# Patient Record
Sex: FEMALE | Race: WHITE | Employment: OTHER | URBAN - METROPOLITAN AREA
[De-identification: names, ages, dates, MRNs, and addresses within clinical notes are randomized per-mention and may not be internally consistent; named-entity substitution may affect disease eponyms.]

---

## 2017-01-03 ENCOUNTER — OFFICE VISIT (OUTPATIENT)
Dept: PAIN MANAGEMENT | Age: 67
End: 2017-01-03

## 2017-01-03 VITALS
HEART RATE: 96 BPM | WEIGHT: 149 LBS | DIASTOLIC BLOOD PRESSURE: 87 MMHG | SYSTOLIC BLOOD PRESSURE: 128 MMHG | BODY MASS INDEX: 28.15 KG/M2

## 2017-01-03 DIAGNOSIS — R53.82 CHRONIC FATIGUE: ICD-10-CM

## 2017-01-03 DIAGNOSIS — M96.1 LUMBAR POST-LAMINECTOMY SYNDROME: ICD-10-CM

## 2017-01-03 DIAGNOSIS — I10 ESSENTIAL HYPERTENSION: ICD-10-CM

## 2017-01-03 DIAGNOSIS — R76.8 HEPATITIS B ANTIBODY POSITIVE: ICD-10-CM

## 2017-01-03 DIAGNOSIS — E78.5 HYPERLIPIDEMIA, UNSPECIFIED HYPERLIPIDEMIA TYPE: ICD-10-CM

## 2017-01-03 DIAGNOSIS — M47.817 LUMBOSACRAL SPONDYLOSIS WITHOUT MYELOPATHY: ICD-10-CM

## 2017-01-03 DIAGNOSIS — M15.9 PRIMARY OSTEOARTHRITIS INVOLVING MULTIPLE JOINTS: ICD-10-CM

## 2017-01-03 DIAGNOSIS — F51.04 CHRONIC INSOMNIA: ICD-10-CM

## 2017-01-03 DIAGNOSIS — M79.2 NEURITIS: ICD-10-CM

## 2017-01-03 DIAGNOSIS — Z79.899 ENCOUNTER FOR LONG-TERM (CURRENT) USE OF HIGH-RISK MEDICATION: ICD-10-CM

## 2017-01-03 DIAGNOSIS — G43.719 INTRACTABLE CHRONIC MIGRAINE WITHOUT AURA AND WITHOUT STATUS MIGRAINOSUS: ICD-10-CM

## 2017-01-03 DIAGNOSIS — D75.1 SECONDARY ERYTHROCYTOSIS: ICD-10-CM

## 2017-01-03 DIAGNOSIS — M79.7 FIBROMYALGIA: Primary | ICD-10-CM

## 2017-01-03 DIAGNOSIS — R74.8 ELEVATED CK: ICD-10-CM

## 2017-01-03 DIAGNOSIS — M51.37 DEGENERATION OF LUMBAR OR LUMBOSACRAL INTERVERTEBRAL DISC: ICD-10-CM

## 2017-01-03 DIAGNOSIS — E53.8 VITAMIN B 12 DEFICIENCY: ICD-10-CM

## 2017-01-03 DIAGNOSIS — G56.03 BILATERAL CARPAL TUNNEL SYNDROME: ICD-10-CM

## 2017-01-03 DIAGNOSIS — E53.8 VITAMIN B 12 DEFICIENCY: Primary | ICD-10-CM

## 2017-01-03 DIAGNOSIS — E03.1 CONGENITAL HYPOTHYROIDISM WITHOUT GOITER: ICD-10-CM

## 2017-01-03 DIAGNOSIS — G24.3 SPASMODIC TORTICOLLIS: ICD-10-CM

## 2017-01-03 RX ORDER — SODIUM CHLORIDE 0.9 % (FLUSH) 0.9 %
5-10 SYRINGE (ML) INJECTION AS NEEDED
Status: CANCELLED | OUTPATIENT
Start: 2017-01-09

## 2017-01-03 RX ORDER — OXYCODONE HYDROCHLORIDE 5 MG/1
5 TABLET ORAL
Qty: 120 TAB | Refills: 0 | Status: ON HOLD | OUTPATIENT
Start: 2017-02-01 | End: 2017-02-20

## 2017-01-03 RX ORDER — OXYCODONE HYDROCHLORIDE 5 MG/1
5 TABLET ORAL
Qty: 120 TAB | Refills: 0 | Status: SHIPPED | OUTPATIENT
Start: 2017-01-03 | End: 2017-03-07 | Stop reason: SDUPTHER

## 2017-01-03 RX ORDER — MIDAZOLAM HYDROCHLORIDE 1 MG/ML
.5-6 INJECTION, SOLUTION INTRAMUSCULAR; INTRAVENOUS
Status: CANCELLED | OUTPATIENT
Start: 2017-01-09

## 2017-01-03 RX ORDER — DEXTROAMPHETAMINE SACCHARATE, AMPHETAMINE ASPARTATE MONOHYDRATE, DEXTROAMPHETAMINE SULFATE AND AMPHETAMINE SULFATE 7.5; 7.5; 7.5; 7.5 MG/1; MG/1; MG/1; MG/1
90 CAPSULE, EXTENDED RELEASE ORAL
Qty: 90 CAP | Refills: 0 | Status: SHIPPED | OUTPATIENT
Start: 2017-02-02 | End: 2017-02-07 | Stop reason: SDUPTHER

## 2017-01-03 RX ORDER — ZOLPIDEM TARTRATE 6.25 MG/1
6.25 TABLET, FILM COATED, EXTENDED RELEASE ORAL
Qty: 30 TAB | Refills: 5 | Status: SHIPPED | OUTPATIENT
Start: 2017-01-07 | End: 2017-02-06

## 2017-01-03 RX ORDER — CYANOCOBALAMIN 1000 UG/ML
1000 INJECTION, SOLUTION INTRAMUSCULAR; SUBCUTANEOUS ONCE
Qty: 1 ML | Refills: 0
Start: 2017-01-03 | End: 2017-01-03

## 2017-01-03 RX ORDER — DEXTROAMPHETAMINE SACCHARATE, AMPHETAMINE ASPARTATE MONOHYDRATE, DEXTROAMPHETAMINE SULFATE AND AMPHETAMINE SULFATE 7.5; 7.5; 7.5; 7.5 MG/1; MG/1; MG/1; MG/1
90 CAPSULE, EXTENDED RELEASE ORAL
Qty: 90 CAP | Refills: 0 | Status: SHIPPED | OUTPATIENT
Start: 2017-01-04 | End: 2017-02-03

## 2017-01-03 NOTE — PROGRESS NOTES
Nursing Notes    Patient presents to the office today in follow-up. Patient rates her pain at 2/10 on the numerical pain scale. Reviewed medications with counts as follows:    Rx Date filled Qty Dispensed Pill Count Last Dose Short   ambien ER 6 12/9/16 30 8 1/2/17 no   rafiq 5 11/4/16 120 10 1/3/17 no   adderall XR 30 12/6/16 90 9 1/3/17 no         Comments:     POC UDS was not performed in office today    Any new labs or imaging since last appointment? YES, URT of bladder, defecography of colon, colonoscopy,    Have you been to an emergency room (ER) or urgent care clinic since your last visit? NO            Have you been hospitalized since your last visit? NO     If yes, where, when, and reason for visit? Have you seen or consulted any other health care providers outside of the [address] [place] [address]  since your last visit? NO     If yes, where, when, and reason for visit? Ms. Ami Sweeney has a reminder for a \"due or due soon\" health maintenance. I have asked that she contact her primary care provider for follow-up on this health maintenance.

## 2017-01-03 NOTE — PROGRESS NOTES
HISTORY OF PRESENT ILLNESS  Felicitas Ray is a 73 y.o. female. HPI she returns for follow-up of a plethora chronic, severe pain which includes chronic migraine headaches, generalized abdominal pain, right hand pain and numbness, as well as generalized pain consistent with fibromyalgia. She also suffers from a post lumbar laminectomy pain syndrome. Since last seen, she underwent colonoscopy and defacography results reviewed as follows:  Findings:       The perianal and digital rectal examinations were        normal. Pertinent negatives include normal sphincter        tone and no palpable rectal lesions. The sigmoid colon and descending colon were        significantly tortuous. There is no endoscopic evidence of bleeding,        diverticula, erythema, mass, polyps, colitis or        angiodysplasia in the entire colon. The adebayo-terminal ileum appeared normal.       There was evidence of a prior end-to-side        ileo-colonic anastomosis in the mid ascending colon. This was patent. This was characterized by healthy        appearing mucosa. This was traversed. The retroflexed view of the distal rectum and anal        verge was normal and showed no anal or rectal        abnormalities. Post-Operative Diagnosis:       - Tortuous colon. - The examined portion of the ileum was normal.       - Patent end-to-side ileo-colonic anastomosis,        characterized by healthy appearing mucosa. - The distal rectum and anal verge are normal on        retroflexion view. - No specimens collected. Findings: Oral contrast was ingested one hour prior to the procedure to provide small bowel contrast opacification.  Barium paste was instilled into the vagina.  Barium paste was instilled into the rectum.  The patient was placed on the radiography toilet in the lateral position.  The patient attempted evacuation maneuvers under fluoroscopic visualization.     The anal rectal angle is normal. There is a large anterior rectocele. There is no  enterocoele. There is no intrarectal wall intussusception. The rectum  almost completely empties. The significance of the above studies was discussed with the patient and she is planning to follow-up with her gastroenterologist.    She is interested in proceeding with Botox injection for her chronic migraine. The criteria for the use of Botox in the treatment of chronic migraine headaches was reviewed with results as follows: According to the International Headache Society, the diagnosis of migraine can be made according to the following criteria:   5 or more attacks for migraine without aura (for migraine with aura, only 2 attacks are sufficient for diagnosis); and   20+   4 hours to 3 days in duration; and  48-72 HOURS   2 or more of the following:  o \"Aggravation by or causing avoidance of routine physical activity\" ; and/or   o \"Moderate or severe pain intensity\"; and/or   o Pulsating; and/or              ALL PRESENT  o Unilateral (affecting half the head); and  o 1 or more of the following:  o \"Nausea and/or vomiting\"; and/or Sensitivity to both light (photophobia) and sound (phonophobia).    An initial 6-month trial of botulinum toxin for prevention of chronic migraine headaches is considered medically necessary when all of the following are met:   Adult individual diagnosed with chronic migraine headache; and  YES   Fifteen (15) or more migraine days per month with headache lasting four (4) hours or longer; and YES   First episode at least six (6) months ago; and  5 YEARS  in adults who have tried and failed trials of at least 3 classes of migraine headache prophylaxis medications of at least 2 months (60 days) duration for each medication:  - Angiotensin-converting enzyme inhibitors/angiotensin II receptor blockers (e.g., losartan, valsartan, lisinopril);   - Anti-depressants (e.g., amitriptyline, clomipramine, doxepin, mirtazapine, nortryptiline, protriptyline);   - Anti-epileptic drugs (e.g., gabapentin, topiramate, valproic acid);   - Beta blockers (e.g., atenolol, metoprolol, nadolol, propranolol, timolol);   - Calcium channel blockers (e.g., diltiazem, nifedipine, nimodipine, verapamil). It is clear that she fulfills the criteria for the use of Botox in the treatment for chronic migraine headaches and this will be scheduled, G4 3.719, 200 units, [id number]. She has noted that her radiofrequency ablations which had been performed in the lumbar region approximately 10 months ago are beginning to wear off and she wishes to have these repeated and these will be scheduled. She also underwent urodynamic testing results which are pending at present. She has also undergone recent laboratory testing which was remarkable for hepatitis B reactivity of both the surface antibody and core antibody. Blood work will be obtained for hepatitis B quantitation to assess whether there is active disease present. Hepatitis C antibody negative. Pain level today 1 out of 10, outcome 8/28,  Physical activity mobility remains significantly curtailed, mood is fair, sleep is poor to fair. No reported side effects. Review of Systems   Constitutional: Positive for malaise/fatigue. Gastrointestinal: Positive for constipation. Neurological: Positive for sensory change (intermittent numbness right hand after use) and weakness (generalized). Psychiatric/Behavioral: The patient has insomnia. All other systems reviewed and are negative. Physical Exam   Constitutional: She is oriented to person, place, and time. No distress. HENT:   Head: Normocephalic and atraumatic. Right Ear: External ear normal.   Left Ear: External ear normal.   Nose: Nose normal.   Mouth/Throat: Oropharynx is clear and moist. No oropharyngeal exudate. Eyes: Conjunctivae and EOM are normal. Pupils are equal, round, and reactive to light.  Right eye exhibits no discharge. Left eye exhibits no discharge. No scleral icterus. Neck: Normal range of motion. Neck supple. No thyromegaly present. Cardiovascular: Normal rate, regular rhythm and normal heart sounds. Pulmonary/Chest: Effort normal and breath sounds normal. No respiratory distress. She has no wheezes. She has no rales. She exhibits no tenderness. Abdominal: Soft. She exhibits no distension. There is no tenderness. There is no rebound and no guarding. Musculoskeletal: Normal range of motion. Neurological: She is alert and oriented to person, place, and time. She has normal reflexes. No cranial nerve deficit. She exhibits normal muscle tone. Coordination normal.   Skin: Skin is warm and dry. No rash noted. Psychiatric: She has a normal mood and affect. Her behavior is normal. Judgment and thought content normal.   Nursing note and vitals reviewed. ASSESSMENT and PLAN  Encounter Diagnoses   Name Primary?  Fibromyalgia Yes    Vitamin B 12 deficiency     Bilateral carpal tunnel syndrome     Spasmodic torticollis     Neuritis     Chronic fatigue     Hyperlipidemia, unspecified hyperlipidemia type     Encounter for long-term (current) use of high-risk medication     Chronic insomnia     Elevated CK     Secondary erythrocytosis     Congenital hypothyroidism without goiter     Essential hypertension     Lumbosacral spondylosis without myelopathy     Degeneration of lumbar or lumbosacral intervertebral disc     Primary osteoarthritis involving multiple joints     Lumbar post-laminectomy syndrome     treatment plan as outlined above. One month reassess her    At the conclusion of the visit, 1000 µg of vitamin B12 was administered intramuscularly without complication. No concerns are raised for misuse, abuse, or diversion. 1. Pain medications are prescribed with the objective of pain relief and improved physical and psychosocial function in this patient.   2. Counseled patient on proper use of prescribed medications and reviewed opioid contract. 3. Counseled patient about chronic medical conditions and their relationship to anxiety and depression and recommended mental health support as needed. 4. Reviewed with patient self-help tools, home exercise, and lifestyle changes to assist the patient in self-management of symptoms. 5. Advised patient to have a primary care provider to continue care for health maintenance and general medical conditions and support for referral to specialty care as needed. 6. Reviewed with patient the treatment plan, goals of treatment plan, and limitations of treatment plan, to include the potential for side effects from medications and procedures. If side effects occur, it is the responsibility of the patient to inform the clinic so that a change in the treatment plan can be made in a safe manner. The patient is advised that stopping prescribed medication may cause an increase in symptoms and possible medication withdrawal symptoms. The patient is informed an emergency room evaluation may be necessary if this occurs. DISPOSITION: The patients condition and plan were discussed at length and all questions were answered. The patient agrees with the plan.       Counseling occupied > 50% of visit:  Total time: 45 MINUTES

## 2017-01-03 NOTE — PATIENT INSTRUCTIONS
Current health maintenance issues were reviewed and the patient was advised to followup with his/her PCP for completion of these items.

## 2017-01-09 ENCOUNTER — APPOINTMENT (OUTPATIENT)
Dept: GENERAL RADIOLOGY | Age: 67
End: 2017-01-09
Attending: PHYSICAL MEDICINE & REHABILITATION
Payer: MEDICARE

## 2017-01-09 ENCOUNTER — SURGERY (OUTPATIENT)
Age: 67
End: 2017-01-09

## 2017-01-09 PROCEDURE — 77003 FLUOROGUIDE FOR SPINE INJECT: CPT

## 2017-01-09 RX ADMIN — IOPAMIDOL 2 ML: 408 INJECTION, SOLUTION INTRATHECAL at 08:17

## 2017-01-09 RX ADMIN — BETAMETHASONE SODIUM PHOSPHATE AND BETAMETHASONE ACETATE 12 MG: 3; 3 INJECTION, SUSPENSION INTRA-ARTICULAR; INTRALESIONAL; INTRAMUSCULAR at 08:17

## 2017-01-09 RX ADMIN — LIDOCAINE HYDROCHLORIDE 4 ML: 10 INJECTION, SOLUTION EPIDURAL; INFILTRATION; INTRACAUDAL; PERINEURAL at 08:17

## 2017-01-17 ENCOUNTER — OFFICE VISIT (OUTPATIENT)
Dept: PAIN MANAGEMENT | Age: 67
End: 2017-01-17

## 2017-01-17 VITALS — SYSTOLIC BLOOD PRESSURE: 118 MMHG | HEART RATE: 85 BPM | DIASTOLIC BLOOD PRESSURE: 78 MMHG

## 2017-01-17 DIAGNOSIS — M47.817 LUMBOSACRAL SPONDYLOSIS WITHOUT MYELOPATHY: ICD-10-CM

## 2017-01-17 DIAGNOSIS — G89.4 CHRONIC PAIN SYNDROME: Primary | ICD-10-CM

## 2017-01-17 DIAGNOSIS — M51.37 DEGENERATION OF LUMBAR OR LUMBOSACRAL INTERVERTEBRAL DISC: ICD-10-CM

## 2017-01-17 DIAGNOSIS — M47.816 LUMBAR FACET ARTHROPATHY: ICD-10-CM

## 2017-01-17 NOTE — PROGRESS NOTES
[facility]  Interventional Pain Management Consultation History & Physical    PATIENT NAME:  Kaitlynn Ray     YOB: 1950    DATE OF SERVICE:   1/17/2017      CHIEF COMPLAINT:  Back Pain      HISTORY OF PRESENT ILLNESS:   Kaitlynn Ray presents to the pain clinic today for follow on evaluation and to consider interventional pain management options as indicated for the type and location of the pain the patient is presenting with. Kaitlynn Ray patient returns after her interlaminar lumbar epidural steroid injection at interlaminar L4-5 level. She states this procedure is helped her a lot with her low back pain which she believes is due from her scoliosis. She is very appreciative of the pain relief. She is also obtain significant benefit from cervical radiofrequency neurotomy procedures at C5-6, C6-7, C7-T1 levels. She has obtained significant benefit from these procedures also. She has been having a recurrence of her low back pain. She endorses low back pain across her low back. She denies any radiating leg pain. She endorses aching throbbing and increasing burning across her low back. Pain is increased with facet loading maneuvers including flexion and extension of her back twisting and turning. She states this is the exact same pain for which we treated her so successfully with lumbar radiofrequency neurotomy procedures at L2-3, L3-4, L4-5. These procedures were done bilaterally, with the left side done March 30, 2016, and the right side done April 13, 2016. She had many months of essentially pain-free symptoms of her low back. She is able to stand for prolonged periods, she was able to go walking with her grandchildren. She was able to participate in activities with her grandchildren which she greatly appreciated, and she was able to do these activities with no low back pain whatsoever. We reviewed her recent lumbar CT. This is significant for lumbar degenerative disc disease, lumbar facet arthropathy and lumbar facet hypertrophy. She has a history of L5-S1 fusion procedure for anterolisthesis L5 on S1. Hardware appears intact with no surgical complications noted. Scoliosis is noted. Lumbar osteoarthritis is also noted. Patient will be having a colonoscopy within the week. She is noted to have chronic abdominal pain due to history of previous abdominal procedures and coexisting abdominal issues. These are being addressed by her gastroenterologist.     We reviewed options. Patient is having recurrence of her low back pain which is chronic in nature for her. She endorses aching throbbing and burning across her low back. She denies any radiating leg pain. She is noted to have lumbar facet arthropathy and lumbar facet hypertrophy in addition to lumbar degenerative disc disease on recent CT of her lumbar spine. She is also noted to have stable interval changes of lumbar fusion at L5-S1 which was performed for L5-S1 symptomatic anterolisthesis. She previously had in March and April 2016 bilateral lumbar radiofrequency neurotomy procedures at bilateral L2-3, L3-4, L4-5 levels. This offered her 8-9 months substantial and complete pain relief. She is able to lead a much more productive life and be able to participate actively with her grandchildren. Her pain is slowly returning. She appears to have recurrence of her low back pain which is lumbar facet mediated due to lumbar facet arthropathy and lumbar facet hypertrophy. I discussed with her again risk and benefits, indications contraindications and side effects of lumbar radiofrequency neurotomy procedures at the same levels L2-3, L3-4, L4-5,. Patient understands and wishes to proceed. She has no further questions.       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 PAST SURGICAL HISTORY:   The patient  has a past surgical history that includes breast augmentation; hysterectomy; cholecystectomy; pelvic laparoscopy; heent (4/2014); orthopaedic (11/16/15); other surgical; and lumbar fusion (11-16-15). CURRENT MEDICATIONS:   The patient has a current medication list which includes the following prescription(s): oxycodone ir, oxycodone ir, amphetamine-dextroamphetamine xr, amphetamine-dextroamphetamine xr, zolpidem cr, pseudoephedrine, sucralfate, cyclosporine, calcium, ibuprofen, acetaminophen, omega-3s/dha/epa/fish oil, therapeutic multivitamin, levocetirizine, bupropion, nexium, ergocalciferol, iron-fa-c42-g-fwswyrv sodium, oxandrolone, estradiol, zolmitriptan, levothyroxine, flector, lorazepam, lisinopril, pregnenolone, syringe (disposable), needle (disp) 27 g, immune globulin 10% (human), OTHER, and cyanocobalamin.     ALLERGIES:     Allergies   Allergen Reactions    Avelox [Moxifloxacin] Other (comments)     Other reaction(s): other/intolerance  Seizures  Nerve pain      Azithromycin Rash and Other (comments)     Other reaction(s): unknown  Pt states is not allergic to this med  Acute toxic reaction    Bactrim [Sulfamethoprim Ds] Other (comments)     Severe abdominal pain    Bupropion Other (comments)     Muscle pains  All antidepressants.  Ciprofloxacin Other (comments)     Other reaction(s): other/intolerance  Lowers bp  AMS    Duloxetine Other (comments)     \"shocks in brain\"    Focalin [Dexmethylphenidate] Other (comments)     Pt denies any allergic    Keflex [Cephalexin] Other (comments)     Abdominal pain      Macrobid [Nitrofurantoin Monohyd/M-Cryst] Swelling     Other reaction(s): other/intolerance  Swelling of colon  Other reaction(s): other/intolerance  Swelling of colon    Other Medication Other (comments)     Dissolving sutures    Phenergan [Promethazine] Other (comments)     Other reaction(s): neurological reaction  \" i see things\"  hullucinations    Sucralfate Myalgia    Sulfa (Sulfonamide Antibiotics) Hives    Sulfate Salt Unknown (comments)    Venlafaxine Itching    Yeast, Dried Other (comments) and Hives     Patient reported extreme lethargy, bloating/abdominal pain, and digestive problems when consumes yeast or foods containing yeast       FAMILY HISTORY:   The patient family history includes Cancer in her mother; Heart Disease in her father; Hypertension in her mother. SOCIAL HISTORY:   The patient  reports that she has quit smoking. She has never used smokeless tobacco. The patient  reports that she does not drink alcohol. She also  reports that she does not use illicit drugs.     REVIEW OF SYSTEMS:  The patient denies fever, chills, weight loss (Constitutional), rash, itching (Skin), tinnitus, congestion (HENT), blurred vision, photophobia (Eyes), palpitations, orthopnea (Cardiovascular), hemoptysis, wheezing (Respiratory), nausea, vomiting, diarrhea (Gastrointestinal), dysuria, hematuria, urgency (Genitourinary), easy bruising, bleeding abnormalities (Hematologic), bowel or bladder incontinence, loss of consciousness (Neurologic), suicidal or homicidal ideation or hallucinations (Psychiatric). PHYSICAL EXAM:  VS:   Visit Vitals    /78 (BP 1 Location: Right arm, BP Patient Position: Sitting)    Pulse 85     General: Well-developed and well-nourished. Body habitus consistent with recorded height and weight and the calculated BMI. Apparent distress due to low back pain. Head: Normocephalic, atraumatic. Skin: Inspection of the skin reveals no rashes, lesions or infection. CV: Regular rate. No murmurs or rubs noted. No peripheral edema noted. Pulm: Respirations are even and unlabored. Extr: No clubbing, cyanosis, or edema noted. Musculoskeletal:  1. Cervical spine - Improved ROM. No paraspinous tenderness at any level. There is no scoliosis, asymmetry, or musculoskeletal defect. 2. Thoracic spine - Full ROM. No paraspinous tenderness at any level. There is no scoliosis, asymmetry, or musculoskeletal defect. 3. Lumbar spine -decreased range of motion all axes . Paraspinous tenderness at bilateral lower lumbar levels . SI joints are nontender bilaterally. There is no scoliosis, asymmetry, or musculoskeletal defect. 4. Right upper extremity - Full ROM. 5/5 muscle strength in all muscle groups. No pain or tenderness in shoulder, elbow, wrist, or hand. 5. Left upper extremity - Full ROM. 5/5 muscle strength in all muscle groups. No pain or tenderness in shoulder, elbow, wrist, or hand. 6. Right lower extremity - Full ROM. 5/5 muscle strength in all muscle groups. No pain, tenderness, or swelling in the hip, knee, ankle or foot. 7. Left lower extremity - Full ROM. 5/5 muscle strength in all muscle groups. No pain, tenderness, or swelling in the hip, knee, ankle or foot. Neurological:  1. Mental Status - Alert, awake and oriented. Speech is clear and appropriate.   2. Cranial Nerves - Extraocular muscles intact bilaterally. Cranial nerves II-XII grossly intact bilaterally. 3. Gait - antalgic   4. Reflexes - 2+ and symmetric throughout. 5. Sensation - Intact to light touch and pin prick. 6. Provocative Tests - Spurlings negative bilaterally. Straight leg raise negative bilaterally. Psychological:  1. Mood and affect - Appropriate. 2. Speech - Appropriate. 3. Though content - Appropriate. 4. Judgment - Appropriate. ASSESSMENT:      ICD-10-CM ICD-9-CM    1. Chronic pain syndrome G89.4 338.4    2. Lumbosacral spondylosis without myelopathy M47.817 721.3    3. Degeneration of lumbar or lumbosacral intervertebral disc M51.37 722.52    4. Lumbar facet arthropathy (HCC) M12.88 721.3            PLAN:    1. Diagnoses/Plan: Chronic pain syndrome, lumbosacral spondylosis, lumbar disc degeneration, lumbar facet arthropathy. We reviewed options. Patient is having recurrence of her low back pain which is chronic in nature for her. She endorses aching throbbing and burning across her low back. She denies any radiating leg pain. She is noted to have lumbar facet arthropathy and lumbar facet hypertrophy in addition to lumbar degenerative disc disease on recent CT of her lumbar spine. She is also noted to have stable interval changes of lumbar fusion at L5-S1 which was performed for L5-S1 symptomatic anterolisthesis. She previously had in March and April 2016 bilateral lumbar radiofrequency neurotomy procedures at bilateral L2-3, L3-4, L4-5 levels. This offered her 8-9 months substantial and complete pain relief. She is able to lead a much more productive life and be able to participate actively with her grandchildren. Her pain is slowly returning. She appears to have recurrence of her low back pain which is lumbar facet mediated due to lumbar facet arthropathy and lumbar facet hypertrophy.      I discussed with her again risk and benefits, indications contraindications and side effects of lumbar radiofrequency neurotomy procedures at the same levels L2-3, L3-4, L4-5,. Patient understands and wishes to proceed. She has no further questions. 2.    I have thoroughly discussed the risks and benefits, side effects and complications, of any and all procedures that were mentioned at today's patient visit. I have used a skeleton model for added emphasis and patient education. I have answered all questions, and I have obtained verbal confirmation for all procedures planned with the patient. 3.    I have reviewed in great detail today the patient's MRI and other imaging studies with the patient. I have explained to the patient their condition using both actual recent and relevant images. I have used a skeleton model for added emphasis as well as patient education. 4.    I have advised patient to have a primary care provider to continue care for health maintenance and general medical conditions and support for referral to specialty care as needed. 5.    I have reviewed with patient the treatment plan, goals of treatment plan, and limitations of treatment plan, to include the potential for side effects from medications and procedures. If side effects occur, it is the responsibility of the patient to inform the clinic so that a change in the treatment plan can be made in a safe manner. The patient is advised that stopping prescribed medication may cause an increase in symptoms and possible medication withdrawal symptoms. The patient is informed an emergency room evaluation may be necessary if this occurs. DISPOSITION:   The patients condition and plan were discussed at length and all questions were answered. The patient agrees with the plan. A total of 25 minutes was spent with the patient of which over half of the time was spent counseling the patient.      Emmanuel Pena MD 1/17/2017 5:21 PM    Note: Although these clinic notes were documented by the provider at the time of the exam, they have not been proofed and are subject to transcription variance.

## 2017-01-19 ENCOUNTER — HOSPITAL ENCOUNTER (OUTPATIENT)
Dept: PHYSICAL THERAPY | Age: 67
Discharge: HOME | End: 2017-01-19
Payer: COMMERCIAL

## 2017-01-19 PROCEDURE — G8988 SELF CARE GOAL STATUS: HCPCS

## 2017-01-19 PROCEDURE — 97163 PT EVAL HIGH COMPLEX 45 MIN: CPT

## 2017-01-19 PROCEDURE — 97530 THERAPEUTIC ACTIVITIES: CPT

## 2017-01-19 PROCEDURE — G8987 SELF CARE CURRENT STATUS: HCPCS

## 2017-01-19 PROCEDURE — 97112 NEUROMUSCULAR REEDUCATION: CPT

## 2017-01-19 NOTE — PROGRESS NOTES
PF EVALUATION/TREATMENT NOTE     Patient Name: Meli Mak Day  Date:2017  : 1950  [x]  Patient  Verified  Payor: BLUE CROSS MEDICARE / Plan: [place]PO / Product Type: Managed Care Medicare /    In time:1215  Out time:120  Total Treatment Time (min): 65  Total Timed Codes (min): 45  1:1 Treatment Time ( [address] only): 65   Visit #: 1 of 8    Treatment Area: [x] Pelvic Floor     [] Other:    SUBJECTIVE  Pain Level (0-10 scale): 5  At best: 0   At Worst: 10  Increases with bowel movment  Belly button down and through sides. Any medication changes, allergies to medications, adverse drug reactions, diagnosis change, or new procedure performed?: [x] No    [] Yes (see summary sheet for update)  Subjective functional status/changes:   Patient reports that she started with sever abdominal pain in 2015, since has been diagnosed with immunodeficiency disorder. Reports she would not be able to leave the house without her pain medications. Has pain with bowel movements, lower abdominal pain, rectocele,   Using daily enemas (one week ago used 8 bottles, usually uses 3), with history of straining and enema use since childhood. Also reports a 7 year period of bed rest in the past .  Pain with sex(since her 25s), but less painful d/t change in husbands ED  Urine leaks without sensation, advised in the past not to use pads d/t infection risk, therefore changes panties three times a day.  Bigger complaint is having to go, but not, or not getting it all out  Aims for 40 grams of fiber daily, reports drinking 7 or 8 x 8 ounce bottles of water a day      PLOF: active with Grandkids, sewed, embroidered, PT job in  (twice a month)  Limitations to PLOF: fibromyalgia  Mechanism of Injury: medical?   Current symptoms/Complaints: 2015 - got sick, fever and abdominal pain,   Previous Treatment/Compliance: no pelvic floor treatment    Immune specialist (dr. 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)  - Fibromyalgia  Living Situation: Live with , one story home, no steps    Pt Goals: stop pain and leakage  Barriers: [x]pain []financial []time []transportation [x]other - reports fevers and abdominal pain daily   Motivation: good  Substance use: []Alcohol []Tobacco [x]other: oxycodone daily  FABQ Score: [x]low []elevate 26  Cognition: A & O x 4    Other: seemingly anxious  Domestic Life:  performing nearly all household activity, occasionally washes   Activity/Recreational Limitations: does not leave home     OBJECTIVE     Pelvic Floor Dysfunction Evaluation    Musculoskeletal Screen:     Skin Integrity:  [] Healthy [x] Red  [] Labia Atrophy [] Fragile    Sensation: [] Intact [x] Diminished:    Muscle Bulk: [] Symmetrical  [] Well-developed [x] Atrophied:  []L   []R   []B    Prolapse: [] Cystocele:   [x] Rectocele: - difficult to quantify    PERF Score (Performance/Endurance/Repetitions/Flicks)   P: 1 E:4 R: 4 F: 5 Total: 14    Accessory Muscle Use: glut first, abdominals,     Patient has failed previous pelvic floor muscle training?   [] Yes    [x] No    Decreased upright stance, falls into forward flexion   Mild weakness left hip > right;     Breath Assessment: shallow chest breath, strain to incorporate diaphragm    Soft Tissue Assessment: tender left lower abdomen, not so bad suprapubic, tender left pubic bone  Pelvic floor assessment vaginally: deep pelvic floor at 11 with abdominal trigger point, tender left deep 4-5  Rectal evaluation : increased tone through EAS, tender left coccygeus   Poor isolation with lag ant and left side             30 min [x]Eval                  []Re-Eval            10 min Therapeutic Activity: Pt instructed in pelvic floor anatomy/function related to Urinary Incontinence,  Pelvic Organ Prolapse, Fecal Incontinence,  Constipation,  Chronic Pelvic Pain. Patient also instructed in behavior modifications relative to symptoms including dietary intake, bed mobility/body mechanics. Educated in use of step stool for foot support during defecation to facilitate relaxation of OR mm as indicated. []  Increase Tissue extensibility        []  Assess fiber intake    [x]  Assess voiding habits  [x]  Assess bowel habits  []  Other:         10 min Neuromuscular Re-education:  [x]  See flow sheet :   []  Pelvic floor strengthening                 [x]  Pelvic floor downtraining  []  Quality pelvic floor contractions       [x]  Relaxation techniques  []  Urge suppression exercises  []  Other:  Rationale: increase ROM, increase strength, improve coordination and increase proprioception  to improve the patients ability to regain proper voiding mechanics, pelvic floor activation       With   [] TE   [x] TA   [x] neuro  [] manual   [] other: Patient Education: [x] Review HEP    [] Progressed/Changed HEP based on:   [x] positioning   [x] body mechanics   [x] transfers   [] heat/ice application    [] other:      Other Objective/Functional Measures:     Pain Level (0-10 scale) post treatment: 4    ASSESSMENT: see eval for details         [x]  See Plan of Care  []  See progress note/recertification  []  See Discharge Summary         Progress towards goals / Updated goals:    Short Term Goals: To be accomplished in 4 weeks:  1.  Patient will perform Home Exercise Program accurately as adjunct to PT clinic visits to promote healthy lifestyle and improve quality of life. 2. Patient will demonstrate accurate simulation of proper defecation dynamics with min cues for increased ease of defecation and more normal function. 3..Patient to have biofeedback assessment to allow progression of pelvic floor coordination  Long Term Goals: To be accomplished in 8 weeks:  1. Patient demonstrate independence in HEP for maintenance of Pelvic Floor program and improved quality of life, including leaving the house daily to engage in activity unrelated to MD appt. .   2. Patient will report urine leakage decrease of 75% to minimize need to change underpants multiple times daily  3. Patient will demonstrate correct coordination of pelvic floor and abdominal musculature for proper defecation dynamics independently to facilitate more normal defecation/evacuation. 4. Patient will have FOTO score Bowel Constipation of 50 points, Urine 61  indicating improvement in function.  (Bowel 43 at eval, Urine 53)        PLAN  []  Upgrade activities as tolerated     [x]  Continue plan of care  []  Update interventions per flow sheet       []  Discharge due to:_  []  Other:_      Christine Jones, PT 1/19/2017  11:39 AM

## 2017-01-19 NOTE — PROGRESS NOTES
[facility] [address]  [address] [facility] [address], [address] [phone] fax    Plan of Care/ Statement of Necessity for Physical Therapy Services    Patient name: Mary Ray Start of Care: 2017   Referral source: Bharati Oliveros MD : 1950    Medical Diagnosis: Pelvic floor dysfunction [N81.84]   Onset Date:16    Treatment Diagnosis: Pelvic floor dysfunction [N81.84]   Prior Hospitalization: see medical history Provider#: [id number]   Medications: Verified on Patient summary List    Comorbidities: Numerous medical conditions including Fibromyalgia, L/S surgery, REESE and RFA for lumbar spine; Rare Deficiency Illness, A period of 7 years in the past where she reports that she was bed ridden as her body parts shut down; migraines, insomnia, frequent dehydration, reports daily fevers   Prior Level of Function: Has been inactive sine Oct 2015 when she became ill. Now she enjoys playing with grandchildren, but rarely leaves the home. Presently is doing very little in the home. Amb without AD, was able to walk 20 minutes at hospital yesterday             The Plan of Care and following information is based on the information from the initial evaluation. Assessment/ key information: Patient is a 73 y. o.female presenting with Pelvic floor dysfunction [N81.84]. Ms. Miranda Guzmán was a very pleasant and cooperative lady evaluated today for complaints of abdominal pain and urinary leakage. She has a complicated medical past, and appears to be very anxious with a great deal of stress associated with her conditions. I advised that she consult with her psychologist to assist in management of this stress, as she has worked with one in the past.  Evaluation reveals poor pelvic floor isolation with compensations noted at glut and abdomen. She has shallow breathing patterns with minimal diaphragm activation and difficulty relaxing (at pelvic floor, and in general).   She was tolerant to soft tissue assessment, noting tenderness and restrictions through L>R abdomen. We performed internal vaginal assessment with limited activation of pelvic floor (ant and left most limited), rectally she was very tight and guarded therefore difficult to discern. She has a long history of straining with voids, as well as use of daily enemas. We will place heavy emphasis on pain management, proper voiding patterns,  soft tissue release and biofeedback to downtrain to allow pelvic floor isolation and progression of functional activity. . Patient will benefit from skilled PT services to address deficits and facilitate return to premorbid activity level and promote improved quality of life. Evaluation Complexity History HIGH Complexity :3+ comorbidities / personal factors will impact the outcome/ POC ; Examination HIGH Complexity : 4+ Standardized tests and measures addressing body structure, function, activity limitation and / or participation in recreation  ;Presentation HIGH Complexity : Unstable and unpredictable characteristics  ; Clinical Decision Making Other outcome measures limited commmunity involvement, anxiety  HIGH   Overall Complexity Rating: HIGH     Problem List: Pelvic pain/dysfunction, Decreased pelvic floor mm awareness, Decreased pelvic floor mm strength, Use of accessory muscles, Improper voiding habits, Hypertonus of pelvic floor and Urinary urgency    Treatment Plan may include any combination of the following:   Therapeutic exercise, Urge suppression techniques, Neuromuscular re-education, Manual therapy, Physical agent/modality and Patient education  Patient / Family readiness to learn indicated by: asking questions, trying to perform skills and interest    Persons(s) to be included in education: patient (P)    Barriers to Learning/Limitations: yes;  emotional and physical    Patient Goal (s): controlling leakage, pain    Patient Self Reported Health Status: poor    Rehabilitation Potential: fair      Short Term Goals: To be accomplished in 4 weeks:  1. Patient will perform Home Exercise Program accurately as adjunct to PT clinic visits to promote healthy lifestyle and improve quality of life. 2. Patient will demonstrate accurate simulation of proper defecation dynamics with min cues for increased ease of defecation and more normal function. 3..Patient to have biofeedback assessment to allow progression of pelvic floor coordination  Long Term Goals: To be accomplished in 8 weeks:  1. Patient demonstrate independence in HEP for maintenance of Pelvic Floor program and improved quality of life, including leaving the house daily to engage in activity unrelated to MD appt. .   2. Patient will report urine leakage decrease of 75% to minimize need to change underpants multiple times daily  3. Patient will demonstrate correct coordination of pelvic floor and abdominal musculature for proper defecation dynamics independently to facilitate more normal defecation/evacuation. 4. Patient will have FOTO score Bowel Constipation of 50 points, Urine 61  indicating improvement in function. (Bowel 43 at eval, Urine 53)        Frequency / Duration: Patient to be seen 1 times per week for 8 weeks. Patient/ Caregiver education and instruction: Diagnosis, prognosis, Proper Voiding Habits, Diet, Pain Management, Exercises and Bladder Retraining  [x]  Plan of care has been reviewed with TYESHA    G-Codes (GP)    Self Care   Current  CK= 40-59%   Goal  CK= 40-59%    The severity rating is based on clinical judgment and the FOTO Score score. Certification Period: 1/19/17-3/19/17    Salazar Villanueva, PT 1/19/2017 11:34 AM    ________________________________________________________________________    I certify that the above Therapy Services are being furnished while the patient is under my care.  I agree with the treatment plan and certify that this therapy is necessary.     Physician's Signature:____________________  Date:____________Time:____________    Please sign and return to [facility] [address] [place], [address] [phone] fax

## 2017-01-29 RX ORDER — SODIUM CHLORIDE 0.9 % (FLUSH) 0.9 %
5-10 SYRINGE (ML) INJECTION AS NEEDED
Status: CANCELLED | OUTPATIENT
Start: 2017-01-30

## 2017-01-29 RX ORDER — MIDAZOLAM HYDROCHLORIDE 1 MG/ML
.5-6 INJECTION, SOLUTION INTRAMUSCULAR; INTRAVENOUS
Status: CANCELLED | OUTPATIENT
Start: 2017-01-30

## 2017-02-03 RX ORDER — MIDAZOLAM HYDROCHLORIDE 1 MG/ML
.5-6 INJECTION, SOLUTION INTRAMUSCULAR; INTRAVENOUS
Status: CANCELLED | OUTPATIENT
Start: 2017-02-06

## 2017-02-03 RX ORDER — SODIUM CHLORIDE 0.9 % (FLUSH) 0.9 %
5-10 SYRINGE (ML) INJECTION AS NEEDED
Status: CANCELLED | OUTPATIENT
Start: 2017-02-06

## 2017-02-06 ENCOUNTER — SURGERY (OUTPATIENT)
Age: 67
End: 2017-02-06

## 2017-02-06 ENCOUNTER — HOSPITAL ENCOUNTER (OUTPATIENT)
Age: 67
Setting detail: OUTPATIENT SURGERY
Discharge: HOME | End: 2017-02-06
Attending: PHYSICAL MEDICINE & REHABILITATION | Admitting: PHYSICAL MEDICINE & REHABILITATION
Payer: MEDICARE

## 2017-02-06 ENCOUNTER — APPOINTMENT (OUTPATIENT)
Dept: GENERAL RADIOLOGY | Age: 67
End: 2017-02-06
Attending: PHYSICAL MEDICINE & REHABILITATION
Payer: MEDICARE

## 2017-02-06 VITALS
DIASTOLIC BLOOD PRESSURE: 94 MMHG | RESPIRATION RATE: 15 BRPM | SYSTOLIC BLOOD PRESSURE: 133 MMHG | TEMPERATURE: 97.9 F | HEART RATE: 102 BPM | BODY MASS INDEX: 28.13 KG/M2 | HEIGHT: 61 IN | OXYGEN SATURATION: 97 % | WEIGHT: 149 LBS

## 2017-02-06 PROCEDURE — 74011250636 HC RX REV CODE- 250/636: Performed by: PHYSICAL MEDICINE & REHABILITATION

## 2017-02-06 PROCEDURE — 77030020508 HC PD GRND GENRTR BAYL -A: Performed by: PHYSICAL MEDICINE & REHABILITATION

## 2017-02-06 PROCEDURE — 77030029505: Performed by: PHYSICAL MEDICINE & REHABILITATION

## 2017-02-06 PROCEDURE — 74011000250 HC RX REV CODE- 250: Performed by: PHYSICAL MEDICINE & REHABILITATION

## 2017-02-06 PROCEDURE — 99144 HC MOD CS >5 YRS 1ST 30 MINS: CPT | Performed by: PHYSICAL MEDICINE & REHABILITATION

## 2017-02-06 PROCEDURE — 74011250636 HC RX REV CODE- 250/636

## 2017-02-06 PROCEDURE — 76010000010 HC PAIN MGT 31 TO 60 MIN PROC: Performed by: PHYSICAL MEDICINE & REHABILITATION

## 2017-02-06 PROCEDURE — 99152 MOD SED SAME PHYS/QHP 5/>YRS: CPT | Performed by: PHYSICAL MEDICINE & REHABILITATION

## 2017-02-06 RX ORDER — DEXAMETHASONE SODIUM PHOSPHATE 100 MG/10ML
INJECTION INTRAMUSCULAR; INTRAVENOUS AS NEEDED
Status: DISCONTINUED | OUTPATIENT
Start: 2017-02-06 | End: 2017-02-06 | Stop reason: HOSPADM

## 2017-02-06 RX ORDER — SODIUM CHLORIDE 0.9 % (FLUSH) 0.9 %
5-10 SYRINGE (ML) INJECTION AS NEEDED
Status: DISCONTINUED | OUTPATIENT
Start: 2017-02-06 | End: 2017-02-06 | Stop reason: HOSPADM

## 2017-02-06 RX ORDER — LIDOCAINE HYDROCHLORIDE 10 MG/ML
INJECTION, SOLUTION EPIDURAL; INFILTRATION; INTRACAUDAL; PERINEURAL AS NEEDED
Status: DISCONTINUED | OUTPATIENT
Start: 2017-02-06 | End: 2017-02-06 | Stop reason: HOSPADM

## 2017-02-06 RX ORDER — FENTANYL CITRATE 50 UG/ML
25-400 INJECTION, SOLUTION INTRAMUSCULAR; INTRAVENOUS
Status: DISCONTINUED | OUTPATIENT
Start: 2017-02-06 | End: 2017-02-06 | Stop reason: HOSPADM

## 2017-02-06 RX ORDER — LIDOCAINE HYDROCHLORIDE 20 MG/ML
INJECTION, SOLUTION EPIDURAL; INFILTRATION; INTRACAUDAL; PERINEURAL AS NEEDED
Status: DISCONTINUED | OUTPATIENT
Start: 2017-02-06 | End: 2017-02-06 | Stop reason: HOSPADM

## 2017-02-06 RX ORDER — MIDAZOLAM HYDROCHLORIDE 1 MG/ML
.5-6 INJECTION, SOLUTION INTRAMUSCULAR; INTRAVENOUS
Status: DISCONTINUED | OUTPATIENT
Start: 2017-02-06 | End: 2017-02-06 | Stop reason: HOSPADM

## 2017-02-06 RX ADMIN — MIDAZOLAM HYDROCHLORIDE 1 MG: 1 INJECTION, SOLUTION INTRAMUSCULAR; INTRAVENOUS at 07:49

## 2017-02-06 RX ADMIN — FENTANYL CITRATE 50 MCG: 50 INJECTION, SOLUTION INTRAMUSCULAR; INTRAVENOUS at 07:49

## 2017-02-06 RX ADMIN — DEXAMETHASONE SODIUM PHOSPHATE 3 MG: 10 INJECTION INTRAMUSCULAR; INTRAVENOUS at 08:01

## 2017-02-06 RX ADMIN — LIDOCAINE HYDROCHLORIDE 3 ML: 20 INJECTION, SOLUTION INTRAVENOUS at 08:01

## 2017-02-06 RX ADMIN — FENTANYL CITRATE 50 MCG: 50 INJECTION, SOLUTION INTRAMUSCULAR; INTRAVENOUS at 07:55

## 2017-02-06 RX ADMIN — Medication 5 ML: at 07:49

## 2017-02-06 RX ADMIN — LIDOCAINE HYDROCHLORIDE 10 ML: 10 INJECTION, SOLUTION EPIDURAL; INFILTRATION; INTRACAUDAL; PERINEURAL at 07:51

## 2017-02-06 RX ADMIN — Medication 5 ML: at 07:55

## 2017-02-06 NOTE — PROCEDURES
[facility]    THERMOCOAGULATION OF LUMBAR MEDIAL BRANCH  PROCEDURE REPORT      PATIENT:  Janna Ray  YOB: 1950  DATE OF SERVICE:  2/6/2017  [facility]    PRE-PROCEDURE DIAGNOSIS:  See Above    POST-PROCEDURE DIAGNOSIS:  See Above    PROCEDURE:      1. Left radiofrequency thermocoagulation of lumbar medial branch nerves, L2/L3, L3/L4, L4/L5,  (57020, 64636 x2)  2. Fluoroscopic needle guidance (spinal) (26318)  3. Supervision of moderate sedation (45855)  4. Additional 15 minutes of supervised moderate sedation (61934)    ANESTHESIA:  Local with moderate IV sedation. See Medication Administration Record for specific medications and dosage. COMPLICATIONS: None. PHYSICIAN:  Lizet Montes De Oca MD    PRE-PROCEDURE NOTE:  Pre-procedural assessment of the patient was performed including a limited history and physical examination. The details of the procedure were discussed with the patient, including the risks, benefits and alternative options and an informed consent was obtained. The patients NPO status, if necessary for the specific procedure and/or administration of moderate intravenous sedation, if utilized, and availability of a responsible adult to escort the patient following the procedure were confirmed. A peripheral intravenous cannula was placed without difficulty and lactated Ringers solution administered. See nursing notes for details. PROCEDURE NOTE:  The patient was brought to the procedure suite and positioned on the fluoroscopy table in the prone position. Physiologic monitors were applied and supplemental oxygen was administered via nasal cannula. The skin was prepped in the standard surgical fashion and sterile drapes were applied over the procedure site.  Please refer to the Flowsheet for documentation of the patients vital signs and the Medication Administration Record for any oral and/or intravenous sedation administered prior to or during the procedure. 1% Lidocaine was utilized for local anesthesia. Under 10-15 degree ipsilateral oblique fluoroscopic guidance a 15cm 18gauge radiofrequency needle with a 10 mm curved active tip was advanced to the junction of the superior articular process and transverse process of each vertebral level immediately inferior to the above-mentioned dorsal rami medial branch nerves. Under AP fluoroscopic guidance, a similar needle was then placed over the superior margin of the sacral ala to thermocoagulate the L5 medial branch nerve. After each individual needle was placed, sensory and motor testing, at 50 Hz and 2 Hz, respectively, were performed which elicited ipsilateral deep local back discomfort without evidence of motor stimulation in the ipsilateral gluteal muscles or extremity. Following this, 1 mL of lidocaine 2% was injected after the negative aspiration of blood, air or CSF. Final correct needle placement was then confirmed by viewing each needle in AP and lateral fluoroscopic views in addition to repeat sensory and motor testing which, again, elicited ipsilateral deep local back discomfort without evidence of motor stimulation in the ipsilateral gluteal muscles or extremity. Medial branch nerve radiofrequency thermocoagulation was then performed at each level for 120 seconds at 80° centigrade x 1 cycle. Following this, 1/3ml  to 1/2ml of a mixture of lidocaine 1% admixed with dexamethasone 2mg [10mg/ml] was injected through each radiofrequency needle after negative aspiration and before removing each needle. Then, all needles were removed intact. The area was thoroughly cleaned and sterile bandages applied as necessary. The patient tolerated the procedure well without complication and the vital signs remained stable throughout the procedure.     POST-PROCEDURE COURSE:   The patient was escorted from the procedure suite in satisfactory condition and recovered per facility protocol based on the type of procedure performed and/or the sedation utilized. The patient did not experience any adverse events and remained hemodynamically stable during the post-procedure period. DISCHARGE NOTE:  Upon discharge, the patient was able to tolerate fluids and was in no acute distress. The patient was oriented to person, place and time and vital signs were stable. Appropriate post-procedure instructions were provided and explained to the patient in detail and all questions were answered.                     Trinity Castro MD 2/6/2017 8:06 AM

## 2017-02-06 NOTE — H&P (VIEW-ONLY)
PF EVALUATION/TREATMENT NOTE     Patient Name: Nathaly Cullen Day  Date:2017  : 1950  [x]  Patient  Verified  Payor: BLUE CROSS MEDICARE / Plan: [place] / Product Type: Managed Care Medicare /    In time:1215  Out time:120  Total Treatment Time (min): 65  Total Timed Codes (min): 45  1:1 Treatment Time ( [address] only): 65   Visit #: 1 of 8    Treatment Area: [x] Pelvic Floor     [] Other:    SUBJECTIVE  Pain Level (0-10 scale): 5  At best: 0   At Worst: 10  Increases with bowel movment  Belly button down and through sides. Any medication changes, allergies to medications, adverse drug reactions, diagnosis change, or new procedure performed?: [x] No    [] Yes (see summary sheet for update)  Subjective functional status/changes:   Patient reports that she started with sever abdominal pain in 2015, since has been diagnosed with immunodeficiency disorder. Reports she would not be able to leave the house without her pain medications. Has pain with bowel movements, lower abdominal pain, rectocele,   Using daily enemas (one week ago used 8 bottles, usually uses 3), with history of straining and enema use since childhood. Also reports a 7 year period of bed rest in the past .  Pain with sex(since her 25s), but less painful d/t change in husbands ED  Urine leaks without sensation, advised in the past not to use pads d/t infection risk, therefore changes panties three times a day.  Bigger complaint is having to go, but not, or not getting it all out  Aims for 40 grams of fiber daily, reports drinking 7 or 8 x 8 ounce bottles of water a day      PLOF: active with Grandkids, sewed, embroidered, PT job in  (twice a month)  Limitations to PLOF: fibromyalgia  Mechanism of Injury: medical?   Current symptoms/Complaints: 2015 - got sick, fever and abdominal pain,   Previous Treatment/Compliance: no pelvic floor treatment    Immune specialist (dr. 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)  - Fibromyalgia  Living Situation: Live with , one story home, no steps    Pt Goals: stop pain and leakage  Barriers: [x]pain []financial []time []transportation [x]other - reports fevers and abdominal pain daily   Motivation: good  Substance use: []Alcohol []Tobacco [x]other: oxycodone daily  FABQ Score: [x]low []elevate 26  Cognition: A & O x 4    Other: seemingly anxious  Domestic Life:  performing nearly all household activity, occasionally washes   Activity/Recreational Limitations: does not leave home     OBJECTIVE     Pelvic Floor Dysfunction Evaluation    Musculoskeletal Screen:     Skin Integrity:  [] Healthy [x] Red  [] Labia Atrophy [] Fragile    Sensation: [] Intact [x] Diminished:    Muscle Bulk: [] Symmetrical  [] Well-developed [x] Atrophied:  []L   []R   []B    Prolapse: [] Cystocele:   [x] Rectocele: - difficult to quantify    PERF Score (Performance/Endurance/Repetitions/Flicks)   P: 1 E:4 R: 4 F: 5 Total: 14    Accessory Muscle Use: glut first, abdominals,     Patient has failed previous pelvic floor muscle training?   [] Yes    [x] No    Decreased upright stance, falls into forward flexion   Mild weakness left hip > right;     Breath Assessment: shallow chest breath, strain to incorporate diaphragm    Soft Tissue Assessment: tender left lower abdomen, not so bad suprapubic, tender left pubic bone  Pelvic floor assessment vaginally: deep pelvic floor at 11 with abdominal trigger point, tender left deep 4-5  Rectal evaluation : increased tone through EAS, tender left coccygeus   Poor isolation with lag ant and left side             30 min [x]Eval                  []Re-Eval            10 min Therapeutic Activity: Pt instructed in pelvic floor anatomy/function related to Urinary Incontinence,  Pelvic Organ Prolapse, Fecal Incontinence,  Constipation,  Chronic Pelvic Pain. Patient also instructed in behavior modifications relative to symptoms including dietary intake, bed mobility/body mechanics. Educated in use of step stool for foot support during defecation to facilitate relaxation of NV mm as indicated. []  Increase Tissue extensibility        []  Assess fiber intake    [x]  Assess voiding habits  [x]  Assess bowel habits  []  Other:         10 min Neuromuscular Re-education:  [x]  See flow sheet :   []  Pelvic floor strengthening                 [x]  Pelvic floor downtraining  []  Quality pelvic floor contractions       [x]  Relaxation techniques  []  Urge suppression exercises  []  Other:  Rationale: increase ROM, increase strength, improve coordination and increase proprioception  to improve the patients ability to regain proper voiding mechanics, pelvic floor activation       With   [] TE   [x] TA   [x] neuro  [] manual   [] other: Patient Education: [x] Review HEP    [] Progressed/Changed HEP based on:   [x] positioning   [x] body mechanics   [x] transfers   [] heat/ice application    [] other:      Other Objective/Functional Measures:     Pain Level (0-10 scale) post treatment: 4    ASSESSMENT: see eval for details         [x]  See Plan of Care  []  See progress note/recertification  []  See Discharge Summary         Progress towards goals / Updated goals:    Short Term Goals: To be accomplished in 4 weeks:  1.  Patient will perform Home Exercise Program accurately as adjunct to PT clinic visits to promote healthy lifestyle and improve quality of life. 2. Patient will demonstrate accurate simulation of proper defecation dynamics with min cues for increased ease of defecation and more normal function. 3..Patient to have biofeedback assessment to allow progression of pelvic floor coordination  Long Term Goals: To be accomplished in 8 weeks:  1. Patient demonstrate independence in HEP for maintenance of Pelvic Floor program and improved quality of life, including leaving the house daily to engage in activity unrelated to MD appt. .   2. Patient will report urine leakage decrease of 75% to minimize need to change underpants multiple times daily  3. Patient will demonstrate correct coordination of pelvic floor and abdominal musculature for proper defecation dynamics independently to facilitate more normal defecation/evacuation. 4. Patient will have FOTO score Bowel Constipation of 50 points, Urine 61  indicating improvement in function.  (Bowel 43 at eval, Urine 53)        PLAN  []  Upgrade activities as tolerated     [x]  Continue plan of care  []  Update interventions per flow sheet       []  Discharge due to:_  []  Other:_      Brandy Jones, PT 1/19/2017  11:39 AM

## 2017-02-06 NOTE — INTERVAL H&P NOTE
H&P Update:  Nathaly Ray was seen and examined. History and physical has been reviewed. The patient has been examined.  There have been no significant clinical changes since the completion of the originally dated History and Physical.    Signed By: Patricia Schneider MD     February 6, 2017 7:12 AM

## 2017-02-06 NOTE — DISCHARGE INSTRUCTIONS
[facility]      Post Procedures Instructions    *Resume Diet and Activity as tolerated. Rest for the remainder of the day. *You may fell worse before you feel better as the numbing medications wear off before the steroids take effect if used for your procedures. *Do not use affected extremity until numbness or loss of sensation has completely resolved without assistance. *DO NOT DRIVE, operate machinery/heavey equipment for 24 hours. *DO NOT DRINK ALCOHOL for 24 hours as it may interact with the sedation if you received it and also thins your blood and may cause you to bleed. *WAIT 24 hours before starting back ANY Blood thinning medications:   (Heparin, Coumadin, Warfarin, Lovenox, Plavix, Aggrenox)    *Resume Pre-Procedure Medications as prescribed except Blood Thinners unless directed by your Physician or Cardiologist.     *Avoid Hot tubs and Heating pad for 24 hours to prevent dissipation of medications, you may shower to remove bandages and remaining prep residue on the skin. * If you develop a Headache, drink plenty of fluids including beverages with caffeine (Coffee, Mt. Dew etc.) and rest.  If the headache persists longer than 24 hoursor intensifies - Please call [facility] ([facility]) [phone]      * If you are DIABETIC, check your blood sugar three times a day for the next three days, the steroids will increase your blood sugar. If your blood sugar is greater than 400 have someone drive you to the nearest [address]. * If you experience any of the following problems, call the [facility] [phone] between 8:00 am - 4:30pm or After Hours [phone].     Shortness of breath    Fever of 101 F or higher    Nausea / Vomiting (not normal to you)    Increasing stiffness in the neck    Weakness or numbness in the arms or legs that is not resolving    Prolonged and increasing pain > than 4 days    ANYTHING OUT of the ORDINARY TO YOU    If YOU are experiencing a severe reaction / complication that you have never had before post procedure, call 911 or go to the nearest emergency room! All patients must have a  for transportation [place] regardless if you do or do not receive sedation. DISCHARGE SUMMARY from Nurse      PATIENT INSTRUCTIONS:    After Oral  or intravenous sedation, for 24 hours or while taking prescription Narcotics:  · Limit your activities  · Do not drive and operate hazardous machinery  · Do not make important personal or business decisions  · Do  not drink alcoholic beverages  · If you have not urinated within 8 hours after discharge, please contact your surgeon on call. Report the following to your surgeon:  · Excessive pain, swelling, redness or odor of or around the surgical area  · Temperature over 101  · Nausea and vomiting lasting longer than 4 hours or if unable to take medications  · Any signs of decreased circulation or nerve impairment to extremity: change in color, persistent  numbness, tingling, coldness or increase pain  · Any questions        What to do at Home:  Recommended activity: Activity as tolerated, NO DRIVING FOR 24 Hours post injection          *  Please give a list of your current medications to your Primary Care Provider. *  Please update this list whenever your medications are discontinued, doses are      changed, or new medications (including over-the-counter products) are added. *  Please carry medication information at all times in case of emergency situations. These are general instructions for a healthy lifestyle:    No smoking/ No tobacco products/ Avoid exposure to second hand smoke    Surgeon General's Warning:  Quitting smoking now greatly reduces serious risk to your health.     Obesity, smoking, and sedentary lifestyle greatly increases your risk for illness    A healthy diet, regular physical exercise & weight monitoring are important for maintaining a healthy lifestyle    You may be retaining fluid if you have a history of heart failure or if you experience any of the following symptoms:  Weight gain of 3 pounds or more overnight or 5 pounds in a week, increased swelling in our hands or feet or shortness of breath while lying flat in bed. Please call your doctor as soon as you notice any of these symptoms; do not wait until your next office visit. Recognize signs and symptoms of STROKE:    F-face looks uneven    A-arms unable to move or move unevenly    S-speech slurred or non-existent    T-time-call 911 as soon as signs and symptoms begin-DO NOT go       Back to bed or wait to see if you get better-TIME IS BRAIN.

## 2017-02-07 ENCOUNTER — OFFICE VISIT (OUTPATIENT)
Dept: PAIN MANAGEMENT | Age: 67
End: 2017-02-07

## 2017-02-07 VITALS
BODY MASS INDEX: 28.13 KG/M2 | HEIGHT: 61 IN | SYSTOLIC BLOOD PRESSURE: 129 MMHG | HEART RATE: 100 BPM | WEIGHT: 149 LBS | DIASTOLIC BLOOD PRESSURE: 90 MMHG

## 2017-02-07 DIAGNOSIS — Z79.899 ENCOUNTER FOR LONG-TERM (CURRENT) USE OF HIGH-RISK MEDICATION: ICD-10-CM

## 2017-02-07 DIAGNOSIS — E53.8 VITAMIN B 12 DEFICIENCY: ICD-10-CM

## 2017-02-07 DIAGNOSIS — M15.9 PRIMARY OSTEOARTHRITIS INVOLVING MULTIPLE JOINTS: ICD-10-CM

## 2017-02-07 DIAGNOSIS — M43.17 SPONDYLOLISTHESIS AT L5-S1 LEVEL: ICD-10-CM

## 2017-02-07 DIAGNOSIS — M47.812 CERVICAL FACET SYNDROME: ICD-10-CM

## 2017-02-07 DIAGNOSIS — G43.719 INTRACTABLE CHRONIC MIGRAINE WITHOUT AURA AND WITHOUT STATUS MIGRAINOSUS: ICD-10-CM

## 2017-02-07 DIAGNOSIS — M47.816 LUMBAR FACET ARTHROPATHY: ICD-10-CM

## 2017-02-07 DIAGNOSIS — E55.9 VITAMIN D DEFICIENCY: ICD-10-CM

## 2017-02-07 DIAGNOSIS — G89.4 CHRONIC PAIN SYNDROME: ICD-10-CM

## 2017-02-07 DIAGNOSIS — R50.9 FUO (FEVER OF UNKNOWN ORIGIN): ICD-10-CM

## 2017-02-07 DIAGNOSIS — D80.3 IGG DEFICIENCY (HCC): ICD-10-CM

## 2017-02-07 DIAGNOSIS — R10.84 GENERALIZED ABDOMINAL PAIN: ICD-10-CM

## 2017-02-07 DIAGNOSIS — M47.812 CERVICAL SPONDYLOSIS WITHOUT MYELOPATHY: ICD-10-CM

## 2017-02-07 DIAGNOSIS — M96.1 LUMBAR POST-LAMINECTOMY SYNDROME: Primary | ICD-10-CM

## 2017-02-07 LAB
ALCOHOL UR POC: NORMAL
AMPHETAMINES UR POC: NORMAL
BARBITURATES UR POC: NORMAL
BENZODIAZEPINES UR POC: NORMAL
BUPRENORPHINE UR POC: NORMAL
CANNABINOIDS UR POC: NORMAL
CARISOPRODOL UR POC: NORMAL
COCAINE UR POC: NORMAL
FENTANYL UR POC: NORMAL
MDMA/ECSTASY UR POC: NORMAL
METHADONE UR POC: NORMAL
METHAMPHETAMINE UR POC: NORMAL
METHYLPHENIDATE UR POC: NORMAL
OPIATES UR POC: NORMAL
OXYCODONE UR POC: NORMAL
PHENCYCLIDINE UR POC: NORMAL
PROPOXYPHENE UR POC: NORMAL
TRAMADOL UR POC: NORMAL
TRICYCLICS UR POC: NORMAL

## 2017-02-07 RX ORDER — OXYCODONE HCL 10 MG/1
TABLET, FILM COATED, EXTENDED RELEASE ORAL
Qty: 60 TAB | Refills: 0 | Status: SHIPPED | OUTPATIENT
Start: 2017-02-07 | End: 2017-08-22

## 2017-02-07 RX ORDER — CYANOCOBALAMIN 1000 UG/ML
1000 INJECTION, SOLUTION INTRAMUSCULAR; SUBCUTANEOUS ONCE
Qty: 1 ML | Refills: 0
Start: 2017-02-07 | End: 2017-02-07

## 2017-02-07 RX ORDER — DEXTROAMPHETAMINE SACCHARATE, AMPHETAMINE ASPARTATE MONOHYDRATE, DEXTROAMPHETAMINE SULFATE AND AMPHETAMINE SULFATE 7.5; 7.5; 7.5; 7.5 MG/1; MG/1; MG/1; MG/1
90 CAPSULE, EXTENDED RELEASE ORAL
Qty: 90 CAP | Refills: 0 | Status: SHIPPED | OUTPATIENT
Start: 2017-03-03 | End: 2017-04-02

## 2017-02-07 NOTE — PROGRESS NOTES
Nursing Notes    Patient presents to the office today in follow-up. Patient rates her pain at 9/10 on the numerical pain scale. Reviewed medications with counts as follows:    Rx Date filled Qty Dispensed Pill Count Last Dose Short   adderall XR 30 mg 02/02/17 90 78 today no   Oxycodone 5 mg  02/01/17 120 90 today no                           POC UDS was performed in office today    Any new labs or imaging since last appointment? NO    Have you been to an emergency room (ER) or urgent care clinic since your last visit? NO            Have you been hospitalized since your last visit? NO     If yes, where, when, and reason for visit? Have you seen or consulted any other health care providers outside of the [address] [place] [address]  since your last visit? NO     If yes, where, when, and reason for visit? HM deferred to pcp.

## 2017-02-07 NOTE — PROGRESS NOTES
HISTORY OF PRESENT ILLNESS  Henrique Ray is a 73 y.o. female. HPI she returns for follow-up of chronic, severe pain which is widespread and multifactorial.    Since last seen, she underwent small bowel series which resulted in 3 days of diarrhea and rectal bleeding, presumably hemorrhoidal.  On 1/19/17, she noted that her pain pattern changed to the point where she was now having pain late at night in addition to pain in the morning and the afternoon. She has also been experiencing increasing migraine headaches. She had to add Flector patches to the forehead and upper neck area as well as taking Zomig on an increasing basis. We are continuing to await authorization for Botox injection. On 1/23/17, she underwent blood work which was reviewed today and was unremarkable. She does indicate that she had consumed approximately 40 ounces of Gatorade prior to having the blood work done. She has also started drinking Pedialyte on average of 2 L daily. On 2/3, she went to see her eye doctor who felt that her eye problems were secondary to dehydration. On 1/9/17, she underwent medial branch blocks at L4 and 5. Following this, she had no pain or fever for approximately 25 days. It was suggested that she undergo physical therapy which she went ×1 but was unable to continue secondary to concomitant GI issues. On 2/6/17 she underwent radiofrequency ablation on the left side. The results of the radiofrequency ablation may take several weeks to manifest themselves. This was discussed with the patient and her  who accompanied her throughout the visit. The possibility of rebound headaches was also discussed and it was elected to change from oxycodone to OxyContin, 10 mg, twice daily in an attempt to gain better long-term control of her varied pain and minimize the likelihood of rebound headaches.     Pain level today 9 out of 10, outcome 18/28,.nyum  Physical activity and mobility, mood, and sleep are all poor. No reported side effects. A review of the [place] prescription monitoring program does not identify any inconsistency. UDS obtained and reviewed; formal confirmation from laboratory is pending. Review of Systems   Constitutional: Positive for malaise/fatigue. Gastrointestinal: Positive for constipation. Neurological: Positive for sensory change (intermittent numbness right hand after use) and weakness (generalized). Psychiatric/Behavioral: The patient has insomnia. All other systems reviewed and are negative. Physical Exam   Constitutional: She is oriented to person, place, and time. No distress. HENT:   Head: Normocephalic and atraumatic. Right Ear: External ear normal.   Left Ear: External ear normal.   Nose: Nose normal.   Mouth/Throat: Oropharynx is clear and moist. No oropharyngeal exudate. Eyes: Conjunctivae and EOM are normal. Pupils are equal, round, and reactive to light. Right eye exhibits no discharge. Left eye exhibits no discharge. No scleral icterus. Neck: Normal range of motion. Neck supple. No thyromegaly present. Cardiovascular: Normal rate, regular rhythm and normal heart sounds. Pulmonary/Chest: Effort normal and breath sounds normal. No respiratory distress. She has no wheezes. She has no rales. She exhibits no tenderness. Abdominal: Soft. She exhibits no distension. There is no tenderness. There is no rebound and no guarding. Musculoskeletal: Normal range of motion. Neurological: She is alert and oriented to person, place, and time. She has normal reflexes. No cranial nerve deficit. She exhibits normal muscle tone. Coordination normal.   Skin: Skin is warm and dry. No rash noted. Psychiatric: She has a normal mood and affect. Her behavior is normal. Judgment and thought content normal.   Nursing note and vitals reviewed. ASSESSMENT and PLAN  Encounter Diagnoses   Name Primary?     Lumbar post-laminectomy syndrome Yes    Vitamin B 12 deficiency     Chronic pain syndrome     Lumbar facet arthropathy (HCC)     Cervical facet syndrome     Generalized abdominal pain     FUO (fever of unknown origin)     Vitamin D deficiency     Spondylolisthesis at L5-S1 level     Cervical spondylosis without myelopathy     IgG deficiency (HCC)     Primary osteoarthritis involving multiple joints     Intractable chronic migraine without aura and without status migrainosus      Treatment plan as noted above. She will be reassessed in 1 month's time. No concerns are raised for misuse, abuse, or diversion. 1. Pain medications are prescribed with the objective of pain relief and improved physical and psychosocial function in this patient. 2. Counseled patient on proper use of prescribed medications and reviewed opioid contract. 3. Counseled patient about chronic medical conditions and their relationship to anxiety and depression and recommended mental health support as needed. 4. Reviewed with patient self-help tools, home exercise, and lifestyle changes to assist the patient in self-management of symptoms. 5. Advised patient to have a primary care provider to continue care for health maintenance and general medical conditions and support for referral to specialty care as needed. 6. Reviewed with patient the treatment plan, goals of treatment plan, and limitations of treatment plan, to include the potential for side effects from medications and procedures. If side effects occur, it is the responsibility of the patient to inform the clinic so that a change in the treatment plan can be made in a safe manner. The patient is advised that stopping prescribed medication may cause an increase in symptoms and possible medication withdrawal symptoms. The patient is informed an emergency room evaluation may be necessary if this occurs. DISPOSITION: The patients condition and plan were discussed at length and all questions were answered.  The patient agrees with the plan.     Counseling occupied > 50% of visit:  Total time: 45 minutes

## 2017-02-07 NOTE — MR AVS SNAPSHOT
Visit Information Date & Time Provider Department Dept. Phone Encounter #  
 2/7/2017  1:00 PM [facility] [address] [facility] [phone] Follow-up Instructions Return in about 1 month (around 3/7/2017). Your Appointments 2/20/2017  7:30 AM  
PROCEDURE with Mariana Bradford MD  
[facility] NE[facility]O (LIBBY SCHEDULING) Appt Note: Rt. RFA at L2-L4 per Brea Escalante (Repeat). ..... [facility] [address] [facility] [address]  
[phone]  
  
   
 [address] [address]  
  
    
 3/1/2017  2:30 PM  
Office Visit with MD Benigno Bruner [address] for Pain Management [facility]) Appt Note: Post procedure f/u  
 [address] [facility] [address]  
[phone] [facility] [address]  
  
    
 3/7/2017  3:00 PM  
Follow Up with Valerie Campos MD  
[facility] [address] [facility]) Appt Note: return in 1 month; F/U With Provider 30 [facility] [id number] [phone] [address]  
  
    
 4/4/2017 10:40 AM  
Follow Up with Valerie Campos MD  
[facility] [address] [facility]) Appt Note: F/U With Provider 30 [facility] [address]  
[phone]  
  
    
 5/2/2017 10:20 AM  
Follow Up with Valerie Campos MD  
[facility] [address] [facility]) Appt Note: FOLLOW UP  
 30 [facility] [address]  
[phone]  
  
    
 6/2/2017 10:40 AM  
Follow Up with MD Benigno Queen [address] for Pain Management [facility]) Appt Note: FOLLOW UP  
 [address]  
[phone]  
  
    
 7/3/2017 10:40 AM  
Follow Up with Valerie Campos MD  
[facility] [address] [facility]) Appt Note: FOLLOW UP  
 [address]  
[phone]  
  
    
 8/3/2017 11:20 AM  
Follow Up with Chantel Ba MD  
[address] [place] [address] for Pain Management [address]) Appt Note: FOLLOW UP  
 30 [facility] [place] [address]  
[phone] 9/1/2017 10:40 AM  
Follow Up with Chantel Ba MD  
[address] [place] [address] for Pain Management [address]) Appt Note: FOLLOW UP  
 [address]  
[phone]  
  
    
 9/29/2017  1:00 PM  
Follow Up with Chantel Ba MD  
[address] [place] [address] for Pain Management [address]) Appt Note: pt f/u with [address]  
[phone]  
  
    
 10/27/2017 10:40 AM  
Follow Up with Chantel aB MD  
[address] [place] [address] for Pain Management [address]) Appt Note: F/U with provider [address]  
[phone]  
  
    
 11/24/2017 10:40 AM  
Follow Up with Chantel Ba MD  
[address] [place] [address] for Pain Management [address] [place], [place] [address] Road) Appt Note: pt f/u  
 [address] [place] [address]  
[phone]  
  
    
 12/22/2017 10:40 AM  
Follow Up with Chantel Ba MD  
[address] [place] [address] for Pain Management [address] [place] [address] Road) Appt Note: pt f/u  
 [address] [place] [address]  
[phone] Upcoming Health Maintenance Date Due DTaP/Tdap/Td series (1 - Tdap) 10/17/1972 ZOSTER VACCINE AGE 60> 10/17/2011 INFLUENZA AGE 9 TO ADULT 8/1/2016 GLAUCOMA SCREENING Q2Y 10/17/2016 Pneumococcal 65+ High/Highest Risk (1 of 2 - PCV13) 10/17/2016 MEDICARE YEARLY EXAM 10/17/2016 BREAST CANCER SCRN MAMMOGRAM 6/18/2017 COLONOSCOPY 1/9/2022 Allergies as of 2/7/2017  Review Complete On: 2/7/2017 By: Chantel Ba MD  
  
 Severity Noted Reaction Type Reactions Avelox [Moxifloxacin]  06/26/2013    Other (comments) Other reaction(s): other/intolerance Seizures Nerve pain Azithromycin  09/24/2013    Rash, Other (comments) Other reaction(s): unknown Pt states is not allergic to this med Acute toxic reaction Bactrim [Sulfamethoprim Ds]  12/20/2013    Other (comments) Severe abdominal pain Bupropion  06/02/2015    Other (comments) Muscle pains All antidepressants. Ciprofloxacin  01/21/2014    Other (comments) Other reaction(s): other/intolerance Lowers bp AMS Duloxetine  06/02/2015    Other (comments) \"shocks in brain\" Focalin [Dexmethylphenidate]  05/14/2014    Other (comments) Pt denies any allergic Keflex [Cephalexin]  09/12/2014    Other (comments) Abdominal pain Macrobid [Nitrofurantoin Monohyd/m-cryst]  03/21/2014    Swelling Other reaction(s): other/intolerance Swelling of colon Other reaction(s): other/intolerance Swelling of colon Other Medication    Other (comments) Dissolving sutures Phenergan [Promethazine]  07/13/2012    Other (comments) Other reaction(s): neurological reaction \" i see things\" hullucinations Sucralfate  06/02/2015    Myalgia Patient able to tolerate now Sulfa (Sulfonamide Antibiotics)  06/02/2015    Hives Sulfate Salt    Unknown (comments) Venlafaxine  06/02/2015    Itching Yeast, Dried  11/16/2015    Other (comments), Hives Patient reported extreme lethargy, bloating/abdominal pain, and digestive problems when consumes yeast or foods containing yeast  
  
Current Immunizations  Never Reviewed No immunizations on file. Not reviewed this visit You Were Diagnosed With   
  
 Codes Comments Vitamin B 12 deficiency    -  Primary ICD-10-CM: E53.8 ICD-9-CM: 266.2 Vitals BP Pulse Height(growth percentile) Weight(growth percentile) BMI OB Status 129/90 100 5' 1\" (1.549 m) 149 lb (67.6 kg) 28.15 kg/m2 Hysterectomy Smoking Status Former Smoker Vitals History BMI and BSA Data Body Mass Index Body Surface Area  
 28.15 kg/m 2 1.71 m 2 Preferred Pharmacy Pharmacy Name [facility] [place], [address] [facility] [phone] Your Updated Medication List  
  
   
This list is accurate as of: 2/7/17  1:43 PM.  Always use your most recent med list.  
  
  
  
  
 acetaminophen 500 mg tablet Commonly known as:  TYLENOL  
1,000 mg.  
  
 amphetamine-dextroamphetamine XR 30 mg XR capsule Commonly known as:  ADDERALL XR Take 3 Caps (90 mg total) by mouth every morningIndications: ATTENTION-DEFICIT HYPERACTIVITY DISORDER Earliest Fill Date: 3/3/17. Start taking on:  3/3/2017 CALCIUM PO Take 1,000 mg by mouth. * cyanocobalamin 1,000 mcg/mL injection Commonly known as:  VITAMIN B-12  
1 mL by IntraMUSCular route once for 1 dose. * cyanocobalamin 1,000 mcg/mL injection Commonly known as:  VITAMIN B12  
1,000 mcg by IntraMUSCular route every thirty (30) days. ergocalciferol 50,000 unit capsule Commonly known as:  ERGOCALCIFEROL  
TAKE 1 CAPSULE BY MOUTH EVERY 7 DAYS   90 day supply  Indications: VITAMIN D DEFICIENCY  
  
 estradiol 0.1 mg/24 hr  
Commonly known as:  VIVELLE  
1 Patch by TransDERmal route every Monday. FLECTOR 1.3 % Pt12 Generic drug:  diclofenac 1 Patch by TransDERmal route every twelve (12) hours every twelve (12) hours. ibuprofen 600 mg tablet Commonly known as:  MOTRIN Take 600 mg by mouth. Iron-FA-C23-O-Zqohdbg Sodium 72-6--50 mg-mg-mcg-mg-mg Tab Commonly known as:  FERRAPLUS 90 TAKE 1 TABLET BY MOUTH DAILY FOR 90 DAYS  
  
 levocetirizine 5 mg tablet Commonly known as:  Ben Dakins Take 5 mg by mouth daily as needed. levothyroxine 112 mcg tablet Commonly known as:  SYNTHROID Take 1 Tab by mouth Daily (before breakfast). Indications: HYPOTHYROIDISM lisinopril 20 mg tablet Commonly known as:  PRINIVIL, ZESTRIL  
TAKE 1 TABLET BY MOUTH TWO TIMES A DAY 90 day supply  Indications: HYPERTENSION  
  
 LORazepam 2 mg tablet Commonly known as:  ATIVAN Take 2 mg by mouth. Needle (Disp) 27 G 27 gauge x 1 1/4\" Ndle Use with 1ml syringe to inject b-12 OMEGA-3S-DHA-EPA-FISH OIL PO Take  by Mouth. OTHER  
IVG infusions every 4 weeks-Dr. Brooklyn Toussaint * oxyCODONE IR 5 mg immediate release tablet Commonly known as:  Zach Camel Take 1 Tab by mouth four (4) times daily as needed for Pain for up to 30 days. Max Daily Amount: 20 mg. Indications: PAIN  
  
 * oxyCODONE ER 10 mg ER tablet Commonly known as:  OxyCONTIN  
1 po bid --- chronic pain  Indications: Chronic Pain, Severe Pain PREGNENOLONE Take 400 mg by mouth daily. Indications: for stress hormone PRIVIGEN 10 % infusion Generic drug:  immune globulin 10% (human) 30 g by IntraVENous route every thirty (30) days. pseudoephedrine 30 mg tablet Commonly known as:  SUDAFED Take 1 Tab by mouth two (2) times a day. RESTASIS 0.05 % ophthalmic emulsion Generic drug:  cycloSPORINE Administer 1 Drop to both eyes two (2) times a day. SUCRALFATE PO Take  by mouth. Syringe (Disposable) 1 mL Syrg Use to inject b-12 sq  
  
 therapeutic multivitamin tablet Commonly known as:  [facility] Take 1 Tab by Mouth Once a Day. 1500 MG DAILY WELLBUTRIN 75 mg tablet Generic drug:  buPROPion Take  by mouth two (2) times a day. ZOLMitriptan 5 mg tablet Commonly known as:  ZOMIG  
TAKE 1 TABLET BY MOUTH AS NEEDED FOR MIGRAINE FOR UP TO 90 DAYS * Notice: This list has 4 medication(s) that are the same as other medications prescribed for you. Read the directions carefully, and ask your doctor or other care provider to review them with you. Prescriptions Printed Refills amphetamine-dextroamphetamine XR (ADDERALL XR) 30 mg XR capsule 0 Starting on: 3/3/2017 Sig: Take 3 Caps (90 mg total) by mouth every morningIndications: ATTENTION-DEFICIT HYPERACTIVITY DISORDER Earliest Fill Date: 3/3/17. Class: Print Route: Oral  
 oxyCODONE ER (OXYCONTIN) 10 mg ER tablet 0 Si po bid --- chronic pain  Indications: Chronic Pain, Severe Pain Class: Print We Performed the Following THER/PROPH/DIAG INJECTION, SUBCUT/IM [id number] CPT(R)] VITAMIN B12 INJECTION [ [facility]] Follow-up Instructions Return in about 1 month (around 3/7/2017). Patient Instructions Decrease oxycodone to 2x/day as needed Please provide this summary of care documentation to your next provider. Your primary care clinician is listed as Edilma Mckinney. If you have any questions after today's visit, please call [phone].

## 2017-02-09 ENCOUNTER — TELEPHONE (OUTPATIENT)
Dept: PAIN MANAGEMENT | Age: 67
End: 2017-02-09

## 2017-02-09 RX ORDER — DICLOFENAC EPOLAMINE 0.01 MG/1
PATCH TOPICAL
Qty: 180 PATCH | Refills: 3 | Status: SHIPPED | OUTPATIENT
Start: 2017-02-09 | End: 2017-05-10

## 2017-02-09 NOTE — TELEPHONE ENCOUNTER
Attempted to contact pt to scheduled botox. Have LM with triage number. Will also need tickler placed, once appt made.

## 2017-02-16 NOTE — PROGRESS NOTES
[facility] [address], [address] [phone] fax    Discharge Summary  Patient name: Dhruv Ray Start of Care: 2017   Referral source: Marcelino Hanson MD : 1950    Medical Diagnosis: Pelvic floor dysfunction [N81.84] Onset Date:16    Treatment Diagnosis: Pelvic floor dysfunction [N81.84]   Prior Hospitalization: see medical history Provider#: [id number]   Medications: Verified on Patient summary List   Comorbidities: Numerous medical conditions including Fibromyalgia, L/S surgery, REESE and RFA for lumbar spine; Rare Deficiency Illness, A period of 7 years in the past where she reports that she was bed ridden as her body parts shut down; migraines, insomnia, frequent dehydration, reports daily fevers  Prior Level of Function: Has been inactive sine Oct 2015 when she became ill. Now she enjoys playing with grandchildren, but rarely leaves the home. Presently is doing very little in the home. Amb without AD, was able to walk 20 minutes at hospital yesterday           Visits from Start of Care: 1    Missed Visits: 1  Reporting Period : 17 to 17        Assessment/Summary of care: Ms. Jose Antonio Florian was only seen for evaluation, refer there for details at time of her only PT session. She has been unable to get back in d/t a myriad of medical complaints, and at this point is no longer a good candidate for PF PT. We will discharge at this time, goals unmet, present status unknown. Thank you for this referral.      G-Codes (GP)  Not applied as this is an unplanned discharge     The severity rating is based on clinical judgment and theFOTO score.   RECOMMENDATIONS:  [x]Discontinue therapy: []Patient has reached or is progressing toward set goals      [x]Patient is non-compliant or has abdicated      []Due to lack of appreciable progress towards set goals    Barbara Barkley, PT 2017 2:05 PM

## 2017-02-19 RX ORDER — MIDAZOLAM HYDROCHLORIDE 1 MG/ML
.5-6 INJECTION, SOLUTION INTRAMUSCULAR; INTRAVENOUS
Status: CANCELLED | OUTPATIENT
Start: 2017-02-20

## 2017-02-19 RX ORDER — SODIUM CHLORIDE 0.9 % (FLUSH) 0.9 %
5-10 SYRINGE (ML) INJECTION AS NEEDED
Status: CANCELLED | OUTPATIENT
Start: 2017-02-20

## 2017-02-20 ENCOUNTER — TELEPHONE (OUTPATIENT)
Dept: PAIN MANAGEMENT | Age: 67
End: 2017-02-20

## 2017-02-20 ENCOUNTER — APPOINTMENT (OUTPATIENT)
Dept: GENERAL RADIOLOGY | Age: 67
End: 2017-02-20
Attending: PHYSICAL MEDICINE & REHABILITATION
Payer: MEDICARE

## 2017-02-20 ENCOUNTER — HOSPITAL ENCOUNTER (OUTPATIENT)
Age: 67
Setting detail: OUTPATIENT SURGERY
Discharge: HOME | End: 2017-02-20
Attending: PHYSICAL MEDICINE & REHABILITATION | Admitting: PHYSICAL MEDICINE & REHABILITATION
Payer: MEDICARE

## 2017-02-20 ENCOUNTER — SURGERY (OUTPATIENT)
Age: 67
End: 2017-02-20

## 2017-02-20 VITALS
HEIGHT: 60 IN | RESPIRATION RATE: 16 BRPM | HEART RATE: 95 BPM | TEMPERATURE: 98.2 F | SYSTOLIC BLOOD PRESSURE: 121 MMHG | OXYGEN SATURATION: 96 % | WEIGHT: 150 LBS | DIASTOLIC BLOOD PRESSURE: 80 MMHG | BODY MASS INDEX: 29.45 KG/M2

## 2017-02-20 PROCEDURE — 77030029505: Performed by: PHYSICAL MEDICINE & REHABILITATION

## 2017-02-20 PROCEDURE — 99144 HC MOD CS >5 YRS 1ST 30 MINS: CPT | Performed by: PHYSICAL MEDICINE & REHABILITATION

## 2017-02-20 PROCEDURE — 76010000009 HC PAIN MGT 0 TO 30 MIN PROC: Performed by: PHYSICAL MEDICINE & REHABILITATION

## 2017-02-20 PROCEDURE — 74011250636 HC RX REV CODE- 250/636: Performed by: PHYSICAL MEDICINE & REHABILITATION

## 2017-02-20 PROCEDURE — 77030020508 HC PD GRND GENRTR BAYL -A: Performed by: PHYSICAL MEDICINE & REHABILITATION

## 2017-02-20 PROCEDURE — 74011000250 HC RX REV CODE- 250: Performed by: PHYSICAL MEDICINE & REHABILITATION

## 2017-02-20 PROCEDURE — 74011250636 HC RX REV CODE- 250/636

## 2017-02-20 PROCEDURE — 99152 MOD SED SAME PHYS/QHP 5/>YRS: CPT | Performed by: PHYSICAL MEDICINE & REHABILITATION

## 2017-02-20 RX ORDER — MIDAZOLAM HYDROCHLORIDE 1 MG/ML
.5-6 INJECTION, SOLUTION INTRAMUSCULAR; INTRAVENOUS
Status: DISCONTINUED | OUTPATIENT
Start: 2017-02-20 | End: 2017-02-20 | Stop reason: HOSPADM

## 2017-02-20 RX ORDER — SODIUM CHLORIDE 0.9 % (FLUSH) 0.9 %
5-10 SYRINGE (ML) INJECTION AS NEEDED
Status: DISCONTINUED | OUTPATIENT
Start: 2017-02-20 | End: 2017-02-20 | Stop reason: HOSPADM

## 2017-02-20 RX ORDER — LIDOCAINE HYDROCHLORIDE 20 MG/ML
INJECTION, SOLUTION EPIDURAL; INFILTRATION; INTRACAUDAL; PERINEURAL AS NEEDED
Status: DISCONTINUED | OUTPATIENT
Start: 2017-02-20 | End: 2017-02-20 | Stop reason: HOSPADM

## 2017-02-20 RX ORDER — LIDOCAINE HCL/PF 10 MG/ML
SYRINGE (ML) INJECTION AS NEEDED
Status: DISCONTINUED | OUTPATIENT
Start: 2017-02-20 | End: 2017-02-20 | Stop reason: HOSPADM

## 2017-02-20 RX ORDER — DEXAMETHASONE SODIUM PHOSPHATE 100 MG/10ML
INJECTION INTRAMUSCULAR; INTRAVENOUS AS NEEDED
Status: DISCONTINUED | OUTPATIENT
Start: 2017-02-20 | End: 2017-02-20 | Stop reason: HOSPADM

## 2017-02-20 RX ORDER — FENTANYL CITRATE 50 UG/ML
25-400 INJECTION, SOLUTION INTRAMUSCULAR; INTRAVENOUS
Status: DISCONTINUED | OUTPATIENT
Start: 2017-02-20 | End: 2017-02-20 | Stop reason: HOSPADM

## 2017-02-20 RX ADMIN — Medication 5 ML: at 07:44

## 2017-02-20 RX ADMIN — LIDOCAINE HYDROCHLORIDE 3 ML: 20 INJECTION, SOLUTION INTRAVENOUS at 07:55

## 2017-02-20 RX ADMIN — DEXAMETHASONE SODIUM PHOSPHATE 3 MG: 10 INJECTION INTRAMUSCULAR; INTRAVENOUS at 07:56

## 2017-02-20 RX ADMIN — FENTANYL CITRATE 50 MCG: 50 INJECTION, SOLUTION INTRAMUSCULAR; INTRAVENOUS at 07:43

## 2017-02-20 RX ADMIN — Medication 10 ML: at 07:55

## 2017-02-20 RX ADMIN — MIDAZOLAM HYDROCHLORIDE 1.5 MG: 1 INJECTION, SOLUTION INTRAMUSCULAR; INTRAVENOUS at 07:43

## 2017-02-20 NOTE — DISCHARGE INSTRUCTIONS
[address] [place] [address] for Pain Management      Post Procedures Instructions    *Resume Diet and Activity as tolerated. Rest for the remainder of the day. *You may fell worse before you feel better as the numbing medications wear off before the steroids take effect if used for your procedures. *Do not use affected extremity until numbness or loss of sensation has completely resolved without assistance. *DO NOT DRIVE, operate machinery/heavey equipment for 24 hours. *DO NOT DRINK ALCOHOL for 24 hours as it may interact with the sedation if you received it and also thins your blood and may cause you to bleed. *WAIT 24 hours before starting back ANY Blood thinning medications:   (Heparin, Coumadin, Warfarin, Lovenox, Plavix, Aggrenox)    *Resume Pre-Procedure Medications as prescribed except Blood Thinners unless directed by your Physician or Cardiologist.     *Avoid Hot tubs and Heating pad for 24 hours to prevent dissipation of medications, you may shower to remove bandages and remaining prep residue on the skin. * If you develop a Headache, drink plenty of fluids including beverages with caffeine (Coffee, Mt. Dew etc.) and rest.  If the headache persists longer than 24 hoursor intensifies - Please call Center for Pain Management ([facility]) [phone]    * If you are DIABETIC, check your blood sugar three times a day for the next three days, the steroids will increase your blood sugar. If your blood sugar is greater than 400 have someone drive you to the nearest [address]. * If you experience any of the following problems, call the Center for Pain Management [phone] between 8:00 am - 4:30pm or After Hours [phone].     Shortness of breath    Fever of 101 F or higher    Nausea / Vomiting (not normal to you)    Increasing stiffness in the neck    Weakness or numbness in the arms or legs that is not resolving    Prolonged and increasing pain > than 4 days    ANYTHING OUT of the ORDINARY TO YOU    If YOU are experiencing a severe reaction / complication that you have never had before post procedure, call 911 or go to the nearest emergency room! All patients must have a  for transportation [place] regardless if you do or do not receive sedation. DISCHARGE SUMMARY from Nurse      PATIENT INSTRUCTIONS:    After Oral  or intravenous sedation, for 24 hours or while taking prescription Narcotics:  · Limit your activities  · Do not drive and operate hazardous machinery  · Do not make important personal or business decisions  · Do  not drink alcoholic beverages  · If you have not urinated within 8 hours after discharge, please contact your surgeon on call. Report the following to your surgeon:  · Excessive pain, swelling, redness or odor of or around the surgical area  · Temperature over 101  · Nausea and vomiting lasting longer than 4 hours or if unable to take medications  · Any signs of decreased circulation or nerve impairment to extremity: change in color, persistent  numbness, tingling, coldness or increase pain  · Any questions        What to do at Home:  Recommended activity: Activity as tolerated, NO DRIVING FOR 24 Hours post injection          *  Please give a list of your current medications to your Primary Care Provider. *  Please update this list whenever your medications are discontinued, doses are      changed, or new medications (including over-the-counter products) are added. *  Please carry medication information at all times in case of emergency situations. These are general instructions for a healthy lifestyle:    No smoking/ No tobacco products/ Avoid exposure to second hand smoke    Surgeon General's Warning:  Quitting smoking now greatly reduces serious risk to your health.     Obesity, smoking, and sedentary lifestyle greatly increases your risk for illness    A healthy diet, regular physical exercise & weight monitoring are important for maintaining a healthy lifestyle    You may be retaining fluid if you have a history of heart failure or if you experience any of the following symptoms:  Weight gain of 3 pounds or more overnight or 5 pounds in a week, increased swelling in our hands or feet or shortness of breath while lying flat in bed. Please call your doctor as soon as you notice any of these symptoms; do not wait until your next office visit. Recognize signs and symptoms of STROKE:    F-face looks uneven    A-arms unable to move or move unevenly    S-speech slurred or non-existent    T-time-call 911 as soon as signs and symptoms begin-DO NOT go       Back to bed or wait to see if you get better-TIME IS BRAIN.

## 2017-02-20 NOTE — INTERVAL H&P NOTE
H&P Update:  Aziza Ray was seen and examined. History and physical has been reviewed. The patient has been examined.  There have been no significant clinical changes since the completion of the originally dated History and Physical.    Signed By: Henrique Atkinson MD     February 20, 2017 7:04 AM

## 2017-02-20 NOTE — TELEPHONE ENCOUNTER
Received a call from [address] [facility]. Returned call [phone] opt [phone] ([phone]). Spoke with Alivia Guerra - explained I had received a call asking for additional info and all the info had been faxed to [phone]. Alivia Guerra said yes they had received the fax and pa is now in review.

## 2017-02-20 NOTE — PROCEDURES
[facility]    THERMOCOAGULATION OF LUMBAR MEDIAL BRANCH  PROCEDURE REPORT      PATIENT:  Marie Ray  YOB: 1950  DATE OF SERVICE:  2/20/2017  [facility]    PRE-PROCEDURE DIAGNOSIS:  See Above    POST-PROCEDURE DIAGNOSIS:  See Above    PROCEDURE:      1. Right radiofrequency thermocoagulation of lumbar medial branch nerves, L2/3, L3/L4, L4/L5,  (00529, 64636 x2)  2. Fluoroscopic needle guidance (spinal) (68406)  3. Supervision of moderate sedation (24491)  4. Additional 15 minutes of supervised moderate sedation (37818)    ANESTHESIA:  Local with moderate IV sedation. See Medication Administration Record for specific medications and dosage. COMPLICATIONS: None. PHYSICIAN:  Arturo Gant MD    PRE-PROCEDURE NOTE:  Pre-procedural assessment of the patient was performed including a limited history and physical examination. The details of the procedure were discussed with the patient, including the risks, benefits and alternative options and an informed consent was obtained. The patients NPO status, if necessary for the specific procedure and/or administration of moderate intravenous sedation, if utilized, and availability of a responsible adult to escort the patient following the procedure were confirmed. A peripheral intravenous cannula was placed without difficulty and lactated Ringers solution administered. See nursing notes for details. PROCEDURE NOTE:  The patient was brought to the procedure suite and positioned on the fluoroscopy table in the prone position. Physiologic monitors were applied and supplemental oxygen was administered via nasal cannula. The skin was prepped in the standard surgical fashion and sterile drapes were applied over the procedure site.  Please refer to the Flowsheet for documentation of the patients vital signs and the Medication Administration Record for any oral and/or intravenous sedation administered prior to or during the procedure. 1% Lidocaine was utilized for local anesthesia. Under 10-15 degree ipsilateral oblique fluoroscopic guidance a 15cm 18gauge radiofrequency needle with a 10 mm curved active tip was advanced to the junction of the superior articular process and transverse process of each vertebral level immediately inferior to the above-mentioned dorsal rami medial branch nerves. After each individual needle was placed, sensory and motor testing, at 50 Hz and 2 Hz, respectively, were performed which elicited ipsilateral deep local back discomfort without evidence of motor stimulation in the ipsilateral gluteal muscles or extremity. Following this, 1 mL of lidocaine 2% was injected after the negative aspiration of blood, air or CSF. Final correct needle placement was then confirmed by viewing each needle in AP and lateral fluoroscopic views in addition to repeat sensory and motor testing which, again, elicited ipsilateral deep local back discomfort without evidence of motor stimulation in the ipsilateral gluteal muscles or extremity. Medial branch nerve radiofrequency thermocoagulation was then performed at each level for 120 seconds at 80° centigrade x 1 cycle. Following this, 1/3ml  to 1/2ml of a mixture of lidocaine 1% admixed with dexamethasone 2mg [10mg/ml] was injected through each radiofrequency needle after negative aspiration and before removing each needle. Then, all needles were removed intact. The area was thoroughly cleaned and sterile bandages applied as necessary. The patient tolerated the procedure well without complication and the vital signs remained stable throughout the procedure.     POST-PROCEDURE COURSE:   The patient was escorted from the procedure suite in satisfactory condition and recovered per facility protocol based on the type of procedure performed and/or the sedation utilized. The patient did not experience any adverse events and remained hemodynamically stable during the post-procedure period. DISCHARGE NOTE:  Upon discharge, the patient was able to tolerate fluids and was in no acute distress. The patient was oriented to person, place and time and vital signs were stable. Appropriate post-procedure instructions were provided and explained to the patient in detail and all questions were answered.                     Brad Dewitt MD 2/20/2017 9:01 AM

## 2017-02-20 NOTE — H&P (VIEW-ONLY)
Nursing Notes    Patient presents to the office today in follow-up. Patient rates her pain at 9/10 on the numerical pain scale. Reviewed medications with counts as follows:    Rx Date filled Qty Dispensed Pill Count Last Dose Short   adderall XR 30 mg 02/02/17 90 78 today no   Oxycodone 5 mg  02/01/17 120 90 today no                           POC UDS was performed in office today    Any new labs or imaging since last appointment? NO    Have you been to an emergency room (ER) or urgent care clinic since your last visit? NO            Have you been hospitalized since your last visit? NO     If yes, where, when, and reason for visit? Have you seen or consulted any other health care providers outside of the [facility]  since your last visit? NO     If yes, where, when, and reason for visit? HM deferred to pcp.

## 2017-02-21 ENCOUNTER — TELEPHONE (OUTPATIENT)
Dept: PAIN MANAGEMENT | Age: 67
End: 2017-02-21

## 2017-02-21 NOTE — TELEPHONE ENCOUNTER
Botox 200 units approved by [facility] from 2/16/17 to 8/24/17. [facility] to order botox. Spoke with Eliezer Garcia - gave all needed info to get pt registered - transferred me to [facility], pharmacist, to give verbal order. Insurance will be verified and they will contact pt. Pending delivery date is 3/1/17.

## 2017-02-22 ENCOUNTER — TELEPHONE (OUTPATIENT)
Dept: PAIN MANAGEMENT | Age: 67
End: 2017-02-22

## 2017-02-23 ENCOUNTER — OFFICE VISIT (OUTPATIENT)
Dept: PAIN MANAGEMENT | Age: 67
End: 2017-02-23

## 2017-02-23 VITALS
BODY MASS INDEX: 29.45 KG/M2 | SYSTOLIC BLOOD PRESSURE: 124 MMHG | DIASTOLIC BLOOD PRESSURE: 80 MMHG | HEART RATE: 85 BPM | HEIGHT: 60 IN | WEIGHT: 150 LBS

## 2017-02-23 DIAGNOSIS — Z98.1 S/P LUMBAR FUSION: ICD-10-CM

## 2017-02-23 DIAGNOSIS — M47.817 LUMBOSACRAL SPONDYLOSIS WITHOUT MYELOPATHY: ICD-10-CM

## 2017-02-23 DIAGNOSIS — M47.816 LUMBAR FACET ARTHROPATHY: ICD-10-CM

## 2017-02-23 DIAGNOSIS — M51.37 DEGENERATION OF LUMBAR OR LUMBOSACRAL INTERVERTEBRAL DISC: ICD-10-CM

## 2017-02-23 DIAGNOSIS — G89.4 CHRONIC PAIN SYNDROME: Primary | ICD-10-CM

## 2017-02-23 DIAGNOSIS — M48.061 SPINAL STENOSIS, LUMBAR REGION, WITHOUT NEUROGENIC CLAUDICATION: ICD-10-CM

## 2017-02-23 DIAGNOSIS — M43.17 SPONDYLOLISTHESIS AT L5-S1 LEVEL: ICD-10-CM

## 2017-02-23 RX ORDER — METHYLPREDNISOLONE 4 MG/1
TABLET ORAL
Qty: 1 DOSE PACK | Refills: 0 | Status: ON HOLD | OUTPATIENT
Start: 2017-02-23 | End: 2017-03-15

## 2017-02-24 ENCOUNTER — TELEPHONE (OUTPATIENT)
Dept: PAIN MANAGEMENT | Age: 67
End: 2017-02-24

## 2017-02-24 NOTE — TELEPHONE ENCOUNTER
[facility] pharmacy left a voicemail stating that they wished to give patient regular tablets instead of the dose pack ordered by Dr. Ashley Christina. Called Dr. Ashley Christina who gave a verbal order to give patient 21 tabs of Prednisone 5mg. Order was called into [facility]. Patient was called and a voicemail was left providing her with an  Update on prescription. Direct contact information provided.

## 2017-02-25 NOTE — PROGRESS NOTES
[facility]  Interventional Pain Management Consultation History & Physical    PATIENT NAME:  Pat Ray     YOB: 1950    DATE OF SERVICE:   2/23/2017      CHIEF COMPLAINT:  Back Pain      HISTORY OF PRESENT ILLNESS:   Pat Ray presents to the pain clinic today for follow on evaluation and to consider interventional pain management options as indicated for the type and location of the pain the patient is presenting with. Pat Ray patient returns for follow-up evaluation after her bilateral lumbar radiofrequency ablation procedures at bilateral L2-3, L3-4, L4/5 levels. She states that she has been getting fevers every afternoon from 1 to 3 PM she states that her terrible fevers she takes Tylenol with relief of the elevated temperature. She denies any associated nausea vomiting chills night sweats. She has been having some localized discomfort at the area of the epidural steroid injection. She says the only thing that has helped her fevers has been to have an epidural steroid injection. This has apparently broken her fever before. We discussed options. At this point I am reluctant to perform an intralaminar epidural steroid injection just so to break the patient's fever. I have explained this to the patient I do not believe this is the indication to do an invasive procedure. Rather, I will prescribe the patient a Medrol Dosepak and see how she does with this instead. If her fevers persist I may reevaluate her and reconsider her for another procedure as indicated. Patient understands and agrees. 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 PAST SURGICAL HISTORY:   The patient  has a past surgical history that includes breast augmentation; hysterectomy; cholecystectomy; pelvic laparoscopy; heent (4/2014); orthopaedic (11/16/15); other surgical; and lumbar fusion (11-16-15). CURRENT MEDICATIONS:   The patient has a current medication list which includes the following prescription(s): methylprednisolone, flector, amphetamine-dextroamphetamine xr, oxycodone er, pseudoephedrine, sucralfate, cyclosporine, calcium, acetaminophen, omega-3s/dha/epa/fish oil, therapeutic multivitamin, levocetirizine, bupropion, ergocalciferol, iron-fa-u09-l-[id number] sodium, estradiol, zolmitriptan, levothyroxine, flector, lorazepam, lisinopril, pregnenolone, syringe (disposable), needle (disp) 27 g, immune globulin 10% (human), OTHER, and cyanocobalamin. ALLERGIES:     Allergies   Allergen Reactions    Avelox [Moxifloxacin] Other (comments)     Other reaction(s): other/intolerance  Seizures  Nerve pain      Azithromycin Rash and Other (comments)     Other reaction(s): unknown  Pt states is not allergic to this med  Acute toxic reaction    Bactrim [Sulfamethoprim Ds] Other (comments)     Severe abdominal pain    Bupropion Other (comments)     Muscle pains  All antidepressants.      Ciprofloxacin Other (comments)     Other reaction(s): other/intolerance  Lowers bp  AMS    Duloxetine Other (comments)     \"shocks in brain\"    Focalin [Dexmethylphenidate] Other (comments)     Pt denies any allergic    Keflex [Cephalexin] Other (comments)     Abdominal pain      Macrobid [Nitrofurantoin Monohyd/M-Cryst] Swelling     Other reaction(s): other/intolerance  Swelling of colon  Other reaction(s): other/intolerance  Swelling of colon    Other Medication Other (comments)     Dissolving sutures    Phenergan [Promethazine] Other (comments)     Other reaction(s): neurological reaction  \" i see things\"  hullucinations    Sucralfate Myalgia     Patient able to tolerate now     Sulfa (Sulfonamide Antibiotics) Hives    Sulfate Salt Unknown (comments)    Venlafaxine Itching    Yeast, Dried Other (comments) and Hives     Patient reported extreme lethargy, bloating/abdominal pain, and digestive problems when consumes yeast or foods containing yeast       FAMILY HISTORY:   The patient family history includes Cancer in her mother; Heart Disease in her father; Hypertension in her mother. SOCIAL HISTORY:   The patient  reports that she has quit smoking. She has never used smokeless tobacco. The patient  reports that she does not drink alcohol. She also  reports that she does not use illicit drugs. REVIEW OF SYSTEMS:   The patient denies fever, chills, weight loss (Constitutional), rash, itching (Skin), tinnitus, congestion (HENT), blurred vision, photophobia (Eyes), palpitations, orthopnea (Cardiovascular), hemoptysis, wheezing (Respiratory), nausea, vomiting, diarrhea (Gastrointestinal), dysuria, hematuria, urgency (Genitourinary), easy bruising, bleeding abnormalities (Hematologic), bowel or bladder incontinence, loss of consciousness (Neurologic), suicidal or homicidal ideation or hallucinations (Psychiatric).          PHYSICAL EXAM:  VS:   Visit Vitals    /80    Pulse 85    Ht 5' (1.524 m)  Wt 68 kg (150 lb)    BMI 29.29 kg/m2     General: Well-developed and well-nourished. Body habitus consistent with recorded height and weight and the calculated BMI. No apparent distress. Head: Normocephalic, atraumatic. Skin: Inspection of the skin reveals no rashes, lesions or infection. CV: Regular rate. No murmurs or rubs noted. No peripheral edema noted. Pulm: Respirations are even and unlabored. Extr: No clubbing, cyanosis, or edema noted. Musculoskeletal:  1. Cervical spine - Full ROM. No paraspinous tenderness at any level. There is no scoliosis, asymmetry, or musculoskeletal defect. 2. Thoracic spine - Full ROM. No paraspinous tenderness at any level. There is no scoliosis, asymmetry, or musculoskeletal defect. 3. Lumbar spine -mild tenderness along lumbar spine full ROM. SI joints are nontender bilaterally. There is no scoliosis, asymmetry, or musculoskeletal defect. 4. Right upper extremity - Full ROM. 5/5 muscle strength in all muscle groups. No pain or tenderness in shoulder, elbow, wrist, or hand. 5. Left upper extremity - Full ROM. 5/5 muscle strength in all muscle groups. No pain or tenderness in shoulder, elbow, wrist, or hand. 6. Right lower extremity - Full ROM. 5/5 muscle strength in all muscle groups. No pain, tenderness, or swelling in the hip, knee, ankle or foot. 7. Left lower extremity - Full ROM. 5/5 muscle strength in all muscle groups. No pain, tenderness, or swelling in the hip, knee, ankle or foot. Neurological:  1. Mental Status - Alert, awake and oriented. Speech is clear and appropriate. 2. Cranial Nerves - Extraocular muscles intact bilaterally. Cranial nerves II-XII grossly intact bilaterally. 3. Gait - Non-antalgic   4. Reflexes - 2+ and symmetric throughout. 5. Sensation - Intact to light touch and pin prick. 6. Provocative Tests - Spurlings negative bilaterally. Straight leg raise negative bilaterally. Psychological:  1.  Mood and affect - Appropriate. 2. Speech - Appropriate. 3. Though content - Appropriate. 4. Judgment - Appropriate. ASSESSMENT:      ICD-10-CM ICD-9-CM    1. Chronic pain syndrome G89.4 338.4 methylPREDNISolone (MEDROL DOSEPACK) 4 mg tablet   2. Lumbar facet arthropathy (HCC) M12.88 721.3 methylPREDNISolone (MEDROL DOSEPACK) 4 mg tablet   3. Lumbosacral spondylosis without myelopathy M47.817 721.3 methylPREDNISolone (MEDROL DOSEPACK) 4 mg tablet   4. Degeneration of lumbar or lumbosacral intervertebral disc M51.37 722.52 methylPREDNISolone (MEDROL DOSEPACK) 4 mg tablet   5. Spondylolisthesis at L5-S1 level M43.17 756.12 methylPREDNISolone (MEDROL DOSEPACK) 4 mg tablet   6. S/P lumbar fusion Z98.1 V45.4 methylPREDNISolone (MEDROL DOSEPACK) 4 mg tablet   7. Spinal stenosis, lumbar region, without neurogenic claudication M48.06 724.02 methylPREDNISolone (MEDROL DOSEPACK) 4 mg tablet           PLAN:    1. Diagnoses/Plan: Chronic pain syndrome, lumbar facet arthropathy, lumbosacral spondylosis, elevated temperature. We discussed options. At this point I am reluctant to perform an intralaminar epidural steroid injection just so to break the patient's fever. I have explained this to the patient I do not believe this is the indication to do an invasive procedure. Rather, I will prescribe the patient a Medrol Dosepak and see how she does with this instead. If her fevers persist I may reevaluate her and reconsider her for another procedure as indicated. Patient understands and agrees. 2.    I have thoroughly discussed the risks and benefits, side effects and complications, of any and all procedures that were mentioned at today's patient visit. I have used a skeleton model for added emphasis and patient education. I have answered all questions, and I have obtained verbal confirmation for all procedures planned with the patient.    3.    I have reviewed in great detail today the patient's MRI and other imaging studies with the patient. I have explained to the patient their condition using both actual recent and relevant images. I have used a skeleton model for added emphasis as well as patient education. 4.    I have advised patient to have a primary care provider to continue care for health maintenance and general medical conditions and support for referral to specialty care as needed. 5.    I have reviewed with patient the treatment plan, goals of treatment plan, and limitations of treatment plan, to include the potential for side effects from medications and procedures. If side effects occur, it is the responsibility of the patient to inform the clinic so that a change in the treatment plan can be made in a safe manner. The patient is advised that stopping prescribed medication may cause an increase in symptoms and possible medication withdrawal symptoms. The patient is informed an emergency room evaluation may be necessary if this occurs. DISPOSITION:   The patients condition and plan were discussed at length and all questions were answered. The patient agrees with the plan. A total of 15 minutes was spent with the patient of which over half of the time was spent counseling the patient. Patricia Schneider MD 2/24/2017 10:04 PM    Note: Although these clinic notes were documented by the provider at the time of the exam, they have not been proofed and are subject to transcription variance.

## 2017-02-27 ENCOUNTER — TELEPHONE (OUTPATIENT)
Dept: PAIN MANAGEMENT | Age: 67
End: 2017-02-27

## 2017-02-27 NOTE — TELEPHONE ENCOUNTER
Pt called relaying her migraines have been non stop and is requesting something to help. Have attempted to return call and LM with direct triage for call back.

## 2017-02-27 NOTE — TELEPHONE ENCOUNTER
Called [facility] [employer] - spoke with Gardenia casanova for oxycontin - sent for review - as pt has not tried formulary medications ([phone]).

## 2017-03-01 NOTE — TELEPHONE ENCOUNTER
Patient left a voicemail with complaints of migraines 2-3 a day every 24 hrs. Reports that she is out of her migraine medication. She is asking if she can take anything else for her migraine. Attempted to contact patient. No answer. Message was left.

## 2017-03-02 ENCOUNTER — TELEPHONE (OUTPATIENT)
Dept: PAIN MANAGEMENT | Age: 67
End: 2017-03-02

## 2017-03-02 NOTE — TELEPHONE ENCOUNTER
[facility] must have trial and failure of generic fentanyl patch, hydromorphone er, levorphanol, methadone, methadose, mser, mscr, oxymrophone er, or tramadol. No documentation of trial and failure of any of these meds.

## 2017-03-02 NOTE — TELEPHONE ENCOUNTER
Received call from patient stating that she is having 2 - 3 migraines around the clock and is out of zomig; patient states that she has appointment for botox on next Thursday , but she does not know what to do in interim; please advise.

## 2017-03-02 NOTE — TELEPHONE ENCOUNTER
Patient updated regarding provider response ; patient is inquiring as to whether or not there is any medication that can be prescribed in the meanwhile; please advise.

## 2017-03-07 ENCOUNTER — OFFICE VISIT (OUTPATIENT)
Dept: PAIN MANAGEMENT | Age: 67
End: 2017-03-07

## 2017-03-07 VITALS
BODY MASS INDEX: 29.29 KG/M2 | DIASTOLIC BLOOD PRESSURE: 93 MMHG | HEART RATE: 117 BPM | WEIGHT: 150 LBS | SYSTOLIC BLOOD PRESSURE: 133 MMHG

## 2017-03-07 DIAGNOSIS — E78.5 HYPERLIPIDEMIA, UNSPECIFIED HYPERLIPIDEMIA TYPE: ICD-10-CM

## 2017-03-07 DIAGNOSIS — Z79.899 ENCOUNTER FOR LONG-TERM CURRENT USE OF HIGH RISK MEDICATION: ICD-10-CM

## 2017-03-07 DIAGNOSIS — F32.A DEPRESSION: ICD-10-CM

## 2017-03-07 DIAGNOSIS — M47.817 LUMBOSACRAL SPONDYLOSIS WITHOUT MYELOPATHY: ICD-10-CM

## 2017-03-07 DIAGNOSIS — E07.9 THYROID DISEASE: ICD-10-CM

## 2017-03-07 DIAGNOSIS — G43.719 INTRACTABLE CHRONIC MIGRAINE WITHOUT AURA AND WITHOUT STATUS MIGRAINOSUS: ICD-10-CM

## 2017-03-07 DIAGNOSIS — F51.04 CHRONIC INSOMNIA: ICD-10-CM

## 2017-03-07 DIAGNOSIS — E53.8 VITAMIN B 12 DEFICIENCY: ICD-10-CM

## 2017-03-07 DIAGNOSIS — E55.9 UNSPECIFIED VITAMIN D DEFICIENCY: ICD-10-CM

## 2017-03-07 DIAGNOSIS — E78.5 HYPERLIPIDEMIA: ICD-10-CM

## 2017-03-07 DIAGNOSIS — M54.16 LUMBAR NEURITIS: ICD-10-CM

## 2017-03-07 DIAGNOSIS — G43.019 INTRACTABLE MIGRAINE WITHOUT AURA AND WITHOUT STATUS MIGRAINOSUS: ICD-10-CM

## 2017-03-07 DIAGNOSIS — Z79.899 ENCOUNTER FOR LONG-TERM (CURRENT) USE OF MEDICATIONS: ICD-10-CM

## 2017-03-07 DIAGNOSIS — R73.9 HYPERGLYCEMIA: ICD-10-CM

## 2017-03-07 DIAGNOSIS — I10 UNSPECIFIED ESSENTIAL HYPERTENSION: ICD-10-CM

## 2017-03-07 DIAGNOSIS — M79.7 FIBROMYALGIA: Primary | ICD-10-CM

## 2017-03-07 DIAGNOSIS — R74.8 ELEVATED CK: ICD-10-CM

## 2017-03-07 DIAGNOSIS — D75.1 SECONDARY ERYTHROCYTOSIS: ICD-10-CM

## 2017-03-07 DIAGNOSIS — E86.0 DEHYDRATION: ICD-10-CM

## 2017-03-07 DIAGNOSIS — M15.9 PRIMARY OSTEOARTHRITIS INVOLVING MULTIPLE JOINTS: ICD-10-CM

## 2017-03-07 DIAGNOSIS — R61 DIAPHORESIS: ICD-10-CM

## 2017-03-07 DIAGNOSIS — R53.83 FATIGUE: ICD-10-CM

## 2017-03-07 DIAGNOSIS — N81.6 RECTOCELE, FEMALE: ICD-10-CM

## 2017-03-07 DIAGNOSIS — M51.37 DEGENERATION OF LUMBAR OR LUMBOSACRAL INTERVERTEBRAL DISC: ICD-10-CM

## 2017-03-07 LAB
ALCOHOL UR POC: NORMAL
AMPHETAMINES UR POC: NORMAL
BARBITURATES UR POC: NEGATIVE
BENZODIAZEPINES UR POC: NORMAL
BUPRENORPHINE UR POC: NORMAL
CANNABINOIDS UR POC: NEGATIVE
CARISOPRODOL UR POC: NORMAL
COCAINE UR POC: NEGATIVE
FENTANYL UR POC: NORMAL
MDMA/ECSTASY UR POC: NEGATIVE
METHADONE UR POC: NORMAL
METHAMPHETAMINE UR POC: NEGATIVE
METHYLPHENIDATE UR POC: NEGATIVE
OPIATES UR POC: NEGATIVE
OXYCODONE UR POC: NORMAL
PHENCYCLIDINE UR POC: NEGATIVE
PROPOXYPHENE UR POC: NORMAL
TRAMADOL UR POC: NORMAL
TRICYCLICS UR POC: NEGATIVE

## 2017-03-07 RX ORDER — CYANOCOBALAMIN 1000 UG/ML
1000 INJECTION, SOLUTION INTRAMUSCULAR; SUBCUTANEOUS ONCE
Qty: 1 ML | Refills: 0
Start: 2017-03-07 | End: 2017-03-07

## 2017-03-07 RX ORDER — KETOROLAC TROMETHAMINE 10 MG/1
10 TABLET, FILM COATED ORAL 3 TIMES DAILY
Qty: 15 TAB | Refills: 0 | Status: SHIPPED | OUTPATIENT
Start: 2017-03-07 | End: 2017-03-12

## 2017-03-07 RX ORDER — DEXTROAMPHETAMINE SACCHARATE, AMPHETAMINE ASPARTATE, DEXTROAMPHETAMINE SULFATE AND AMPHETAMINE SULFATE 7.5; 7.5; 7.5; 7.5 MG/1; MG/1; MG/1; MG/1
30 TABLET ORAL 3 TIMES DAILY
Qty: 90 TAB | Refills: 0 | Status: ON HOLD | OUTPATIENT
Start: 2017-03-07 | End: 2017-03-15

## 2017-03-07 RX ORDER — KETOROLAC TROMETHAMINE 30 MG/ML
30 INJECTION, SOLUTION INTRAMUSCULAR; INTRAVENOUS ONCE
Qty: 1 VIAL | Refills: 0
Start: 2017-03-07 | End: 2017-03-07

## 2017-03-07 RX ORDER — OXYCODONE HYDROCHLORIDE 5 MG/1
5 TABLET ORAL
Qty: 120 TAB | Refills: 0 | Status: SHIPPED | OUTPATIENT
Start: 2017-03-07 | End: 2017-04-04 | Stop reason: SDUPTHER

## 2017-03-07 NOTE — PROGRESS NOTES
HISTORY OF PRESENT ILLNESS  Erick Ray is a 73 y.o. female. HPI she returns for follow-up of chronic, severe pain which is widespread and multifactorial    She reports that on 1/9/17 she underwent an interlaminar epidural steroid injection at L4-5 which resulted in complete resolution of pain and fever which lasted until 1/12/17. She wishes to have this repeated and this will be scheduled. On 2/6/17, she underwent radiofrequency ablation of the left lumbar medial branches. Unfortunately, she obtained about 2 hours of relief at which point the pain recurred. On 2/9/17, she underwent mammography which was unremarkable. On 2/16/17, she was treated in the emergency room secondary to dehydration and received IV hydration. A barium enema was said to be unremarkable. On 2/28/17, she underwent root canal #2. She indicates that she has been classified as homebound and has a nurse advocate to help expedite IV therapy his blood drawings at home. A considerable amount of time was spent reviewing the Nutrisystem D diet. The patient brought in several pages of information. It was determined that this diet is not incompatible with her current illnesses and medications. A review of the [place] prescription monitoring program does not identify any inconsistency. UDS obtained and reviewed; formal confirmation from laboratory is pending. Review of Systems   Constitutional: Positive for malaise/fatigue. Gastrointestinal: Positive for constipation. Neurological: Positive for sensory change (intermittent numbness right hand after use) and weakness (generalized). Psychiatric/Behavioral: The patient has insomnia. All other systems reviewed and are negative. Physical Exam   Constitutional: She is oriented to person, place, and time. No distress. HENT:   Head: Normocephalic and atraumatic.    Right Ear: External ear normal.   Left Ear: External ear normal.   Nose: Nose normal. Mouth/Throat: Oropharynx is clear and moist. No oropharyngeal exudate. Eyes: Conjunctivae and EOM are normal. Pupils are equal, round, and reactive to light. Right eye exhibits no discharge. Left eye exhibits no discharge. No scleral icterus. Neck: Normal range of motion. Neck supple. No thyromegaly present. Cardiovascular: Normal rate, regular rhythm and normal heart sounds. Pulmonary/Chest: Effort normal and breath sounds normal. No respiratory distress. She has no wheezes. She has no rales. She exhibits no tenderness. Abdominal: Soft. She exhibits no distension. There is no tenderness. There is no rebound and no guarding. Musculoskeletal: Normal range of motion. Neurological: She is alert and oriented to person, place, and time. She has normal reflexes. No cranial nerve deficit. She exhibits normal muscle tone. Coordination normal.   Skin: Skin is warm and dry. No rash noted. Psychiatric: She has a normal mood and affect. Her behavior is normal. Judgment and thought content normal.   Nursing note and vitals reviewed. ASSESSMENT and PLAN  Encounter Diagnoses   Name Primary?     Fibromyalgia Yes    Encounter for long-term (current) use of medications     Vitamin B 12 deficiency     Intractable chronic migraine without aura and without status migrainosus     Lumbar neuritis     Fatigue     Thyroid disease     Hyperlipidemia     Depression     Encounter for long-term current use of high risk medication     Dehydration     Diaphoresis     Chronic insomnia     Intractable migraine without aura and without status migrainosus     Unspecified essential hypertension     Rectocele, female     Elevated CK     Secondary erythrocytosis     Lumbosacral spondylosis without myelopathy     Degeneration of lumbar or lumbosacral intervertebral disc     Hyperlipidemia, unspecified hyperlipidemia type     Unspecified vitamin D deficiency     Hyperglycemia     Primary osteoarthritis involving multiple joints      She will be scheduled for her lumbar epidural steroid injection at L4-5 as noted above. She will continue on her current treatment regimen  1 month reassess her    No concerns are raised for misuse, abuse, or diversion. 1. Pain medications are prescribed with the objective of pain relief and improved physical and psychosocial function in this patient. 2. Counseled patient on proper use of prescribed medications and reviewed opioid contract. 3. Counseled patient about chronic medical conditions and their relationship to anxiety and depression and recommended mental health support as needed. 4. Reviewed with patient self-help tools, home exercise, and lifestyle changes to assist the patient in self-management of symptoms. 5. Advised patient to have a primary care provider to continue care for health maintenance and general medical conditions and support for referral to specialty care as needed. 6. Reviewed with patient the treatment plan, goals of treatment plan, and limitations of treatment plan, to include the potential for side effects from medications and procedures. If side effects occur, it is the responsibility of the patient to inform the clinic so that a change in the treatment plan can be made in a safe manner. The patient is advised that stopping prescribed medication may cause an increase in symptoms and possible medication withdrawal symptoms. The patient is informed an emergency room evaluation may be necessary if this occurs. DISPOSITION: The patients condition and plan were discussed at length and all questions were answered. The patient agrees with the plan.     Counseling occupied > 50% of visit:  Total time: 40 minutes

## 2017-03-07 NOTE — PROGRESS NOTES
Nursing Notes    Patient presents to the office today in follow-up. Patient rates her pain at 10/10 on the numerical pain scale. Reviewed medications with counts as follows:    Rx Date filled Qty Dispensed Pill Count Last Dose Short   adderall xr 3/3/17 90 78 3/7/17 no   rafiq 5 2/1/17 120 47 3/7/17 no   oxycontin 10 2/7/17 60 1 3/7/17 no         Comments: pt relays she took left over root canal pain meds prior to her last visit due to pain. Could not remember name of medicine. POC UDS was performed in office today    Any new labs or imaging since last appointment? Barium enema , pap smear    Have you been to an emergency room (ER) or urgent care clinic since your last visit? Yes, [facility] for 2/16/17, fluid need           Have you been hospitalized since your last visit? NO     If yes, where, when, and reason for visit? Have you seen or consulted any other health care providers outside of the [address] [place] [address]  since your last visit? [facility]    And dentist for root canal  If yes, where, when, and reason for visit? Ms. Freddy Aleman has a reminder for a \"due or due soon\" health maintenance. I have asked that she contact her primary care provider for follow-up on this health maintenance.

## 2017-03-08 ENCOUNTER — TELEPHONE (OUTPATIENT)
Dept: PAIN MANAGEMENT | Age: 67
End: 2017-03-08

## 2017-03-08 NOTE — TELEPHONE ENCOUNTER
Called [facility] re botox shipment - spoke with YaKlass - said pt has not completed enrollment for botox. Must call [phone] to complete enrollment before botox can be shipped. Gave this info to [place].

## 2017-03-09 ENCOUNTER — OFFICE VISIT (OUTPATIENT)
Dept: PAIN MANAGEMENT | Age: 67
End: 2017-03-09

## 2017-03-09 VITALS
SYSTOLIC BLOOD PRESSURE: 146 MMHG | TEMPERATURE: 96 F | WEIGHT: 150 LBS | HEART RATE: 109 BPM | BODY MASS INDEX: 29.29 KG/M2 | DIASTOLIC BLOOD PRESSURE: 93 MMHG

## 2017-03-09 DIAGNOSIS — G43.719 INTRACTABLE CHRONIC MIGRAINE WITHOUT AURA AND WITHOUT STATUS MIGRAINOSUS: Primary | ICD-10-CM

## 2017-03-09 NOTE — PROGRESS NOTES
See scanned report    [facility] [address] [place] [address],[address] [place] [address] PAIN MANAGEMENT  OFFICE PROCEDURE PROGRESS NOTE        Chart reviewed for the following:   IToni RN, have reviewed the History, Physical and updated the Allergic reactions for Milotn Shari A Day     TIME OUT performed immediately prior to start of procedure:   Pablo Mcgarry M.D. have performed the following reviews on Milton Shari A Day prior to the start of the procedure:            * Patient was identified by name and date of birth   * Agreement on procedure being performed was verified  * Risks and Benefits explained to the patient  * Procedure site verified and marked as necessary  * Consent was signed and verified     Time: 1:36 PM       Date of procedure: 3/9/2017    Procedure performed by:  Jelani Grove MD    Assisted by:patient    How tolerated by patient: tolerated the procedure well with no complications    Comments: none     Patient Label  Signature:_____________________________      Procedure: Botox Injection  After consent was obtained, using sterile technique the  areas were prepped. Local anesthetic used: ethyl chloride topical. Botox 200 units was then injected as per scanned diagram.  The procedure was well tolerated. The patient is asked to continue to rest the area for a few more days before resuming regular activities. It may be more painful for the first 1-2 days. Watch for fever, or increased swelling or persistent pain in the joint. Call or return to clinic prn if such symptoms occur or there is failure to improve as anticipated.     -------------------------------------------------------------------------------    PAIN LEVEL AT CONCLUSION OF PROCEDURE:  6/10

## 2017-03-15 ENCOUNTER — HOSPITAL ENCOUNTER (OUTPATIENT)
Age: 67
Setting detail: OUTPATIENT SURGERY
Discharge: HOME | End: 2017-03-15
Attending: PHYSICAL MEDICINE & REHABILITATION | Admitting: PHYSICAL MEDICINE & REHABILITATION
Payer: MEDICARE

## 2017-03-15 ENCOUNTER — APPOINTMENT (OUTPATIENT)
Dept: GENERAL RADIOLOGY | Age: 67
End: 2017-03-15
Attending: PHYSICAL MEDICINE & REHABILITATION
Payer: MEDICARE

## 2017-03-15 VITALS
TEMPERATURE: 97.9 F | HEIGHT: 60 IN | SYSTOLIC BLOOD PRESSURE: 141 MMHG | HEART RATE: 106 BPM | DIASTOLIC BLOOD PRESSURE: 78 MMHG | RESPIRATION RATE: 18 BRPM | OXYGEN SATURATION: 99 % | WEIGHT: 145 LBS | BODY MASS INDEX: 28.47 KG/M2

## 2017-03-15 PROCEDURE — 74011250637 HC RX REV CODE- 250/637: Performed by: PHYSICAL MEDICINE & REHABILITATION

## 2017-03-15 PROCEDURE — 74011636320 HC RX REV CODE- 636/320

## 2017-03-15 PROCEDURE — 74011250636 HC RX REV CODE- 250/636

## 2017-03-15 PROCEDURE — 74011000250 HC RX REV CODE- 250

## 2017-03-15 PROCEDURE — 76010000009 HC PAIN MGT 0 TO 30 MIN PROC: Performed by: PHYSICAL MEDICINE & REHABILITATION

## 2017-03-15 RX ORDER — SODIUM CHLORIDE 0.9 % (FLUSH) 0.9 %
5-10 SYRINGE (ML) INJECTION AS NEEDED
Status: CANCELLED | OUTPATIENT
Start: 2017-03-15

## 2017-03-15 RX ORDER — SODIUM CHLORIDE 0.9 % (FLUSH) 0.9 %
5-10 SYRINGE (ML) INJECTION AS NEEDED
Status: DISCONTINUED | OUTPATIENT
Start: 2017-03-15 | End: 2017-03-15 | Stop reason: HOSPADM

## 2017-03-15 RX ORDER — MIDAZOLAM HYDROCHLORIDE 1 MG/ML
.5-6 INJECTION, SOLUTION INTRAMUSCULAR; INTRAVENOUS
Status: CANCELLED | OUTPATIENT
Start: 2017-03-15

## 2017-03-15 RX ORDER — FENTANYL CITRATE 50 UG/ML
25-400 INJECTION, SOLUTION INTRAMUSCULAR; INTRAVENOUS
Status: DISCONTINUED | OUTPATIENT
Start: 2017-03-15 | End: 2017-03-15 | Stop reason: HOSPADM

## 2017-03-15 RX ORDER — BETAMETHASONE SODIUM PHOSPHATE AND BETAMETHASONE ACETATE 3; 3 MG/ML; MG/ML
INJECTION, SUSPENSION INTRA-ARTICULAR; INTRALESIONAL; INTRAMUSCULAR; SOFT TISSUE AS NEEDED
Status: DISCONTINUED | OUTPATIENT
Start: 2017-03-15 | End: 2017-03-15 | Stop reason: HOSPADM

## 2017-03-15 RX ORDER — LIDOCAINE HYDROCHLORIDE 10 MG/ML
INJECTION, SOLUTION EPIDURAL; INFILTRATION; INTRACAUDAL; PERINEURAL AS NEEDED
Status: DISCONTINUED | OUTPATIENT
Start: 2017-03-15 | End: 2017-03-15 | Stop reason: HOSPADM

## 2017-03-15 RX ORDER — MIDAZOLAM HYDROCHLORIDE 1 MG/ML
.5-6 INJECTION, SOLUTION INTRAMUSCULAR; INTRAVENOUS
Status: DISCONTINUED | OUTPATIENT
Start: 2017-03-15 | End: 2017-03-15 | Stop reason: HOSPADM

## 2017-03-15 RX ORDER — DIAZEPAM 5 MG/1
5-20 TABLET ORAL ONCE
Status: COMPLETED | OUTPATIENT
Start: 2017-03-15 | End: 2017-03-15

## 2017-03-15 RX ADMIN — DIAZEPAM 10 MG: 5 TABLET ORAL at 09:14

## 2017-03-15 NOTE — PROCEDURES
[facility]    LUMBAR EPIDURAL STEROID INJECTION  PROCEDURE REPORT      PATIENT:  Elvie Ray  YOB: 1950  DATE OF SERVICE:  3/15/2017  SITE:  [facility]    PRE-PROCEDURE DIAGNOSIS:  See Above    POST-PROCEDURE DIAGNOSIS:  See Above                PROCEDURE:    1. Lumbar Interlaminar epidural steroid injection, L4-5 (24812)  2. Fluoroscopic needle guidance (spinal) (10089)        3. Supervision of moderate sedation (09184)    ANESTHESIA:  Local with moderate IV sedation. See Medication Administration Record for specific medications and dosage. COMPLICATIONS: None. PHYSICIAN:  Brad Dewitt MD    PRE-PROCEDURE NOTE:  Pre-procedural assessment of the patient was performed including a limited history and physical examination. The details of the procedure were discussed with the patient, including the risks, benefits and alternative options and an informed consent was obtained. The patients NPO status, if necessary for the specific procedure and/or administration of moderate intravenous sedation, if utilized, and availability of a responsible adult to escort the patient following the procedure were confirmed. PROCEDURE NOTE:  The patient was brought to the procedure suite and positioned on the fluoroscopy table in the prone position. Physiologic monitors were applied and supplemental oxygen was administered via nasal cannula. The skin was prepped in the standard surgical fashion and sterile drapes were applied over the procedure site. Please refer to the Flowsheet for documentation of the patients vital signs and the Medication Administration Record for any oral and/or intravenous sedation administered prior to or during the procedure. The above-listed interlaminar space was identified and the skin and subcutaneous tissues were infiltrated with 1% Lidocaine.   Under anterior-posterior fluoroscopic guidance an 18g, 3.5-inch Tuohy epidural needle was advanced along the previously identified interlaminar space. The fluoroscope was then turned lateral view and a loss of resistance syringe was attached to the needle containing preservative free normal saline. Under lateral flouroscopic guidance, the needle was then advanced through the ligamentum flavum, entering the epidural space with a clear and crisp loss of resistance. The needle was not advanced beyond the interlaminar line at any time during this process. Aspiration was negative for blood or CSF. Additional confirmation was made with the injection of 1 mL of a nonionic water-soluble radiographic contrast medium (Isovue-M 200). Following this, 4 mL of a solution comprised of 2 mL of lidocaine 1% and 2mL betamethasone (6mg/ml) was injected slowly through the epidural needle. The needle was cleared of steroid solution and removed. The area was thoroughly cleaned and sterile bandages applied as necessary. The patient tolerated the procedure well and vital signs remained stable throughout the procedure. POST-PROCEDURE COURSE:   The patient was escorted from the procedure suite in satisfactory condition and recovered per facility protocol based on the type of procedure performed and/or the sedation utilized. The patient did not experience any adverse events and remained hemodynamically stable during the post-procedure period. DISCHARGE NOTE:  Upon discharge, the patient was able to tolerate fluids and was in no acute distress. The patient was oriented to person, place and time and vital signs were stable. Appropriate post-procedure instructions were provided and explained to the patient in detail and all questions were answered.     Deann Green MD 3/15/2017 12:03 PM

## 2017-03-15 NOTE — DISCHARGE INSTRUCTIONS
[facility]      Post Procedures Instructions    *Resume Diet and Activity as tolerated. Rest for the remainder of the day. *You may fell worse before you feel better as the numbing medications wear off before the steroids take effect if used for your procedures. *Do not use affected extremity until numbness or loss of sensation has completely resolved without assistance. *DO NOT DRIVE, operate machinery/heavey equipment for 24 hours. *DO NOT DRINK ALCOHOL for 24 hours as it may interact with the sedation if you received it and also thins your blood and may cause you to bleed. *WAIT 24 hours before starting back ANY Blood thinning medications:   (Heparin, Coumadin, Warfarin, Lovenox, Plavix, Aggrenox)    *Resume Pre-Procedure Medications as prescribed except Blood Thinners unless directed by your Physician or Cardiologist.     *Avoid Hot tubs and Heating pad for 24 hours to prevent dissipation of medications, you may shower to remove bandages and remaining prep residue on the skin. * If you develop a Headache, drink plenty of fluids including beverages with caffeine (Coffee, Mt. Dew etc.) and rest.  If the headache persists longer than 24 hoursor intensifies - Please call [facility]) [phone]    * If you are DIABETIC, check your blood sugar three times a day for the next three days, the steroids will increase your blood sugar. If your blood sugar is greater than 400 have someone drive you to the nearest [address]. * If you experience any of the following problems, call the [facility] [phone] between 8:00 am - 4:30pm or After Hours [phone].     Shortness of breath    Fever of 101 F or higher    Nausea / Vomiting (not normal to you)    Increasing stiffness in the neck    Weakness or numbness in the arms or legs that is not resolving    Prolonged and increasing pain > than 4 days    ANYTHING OUT of the ORDINARY TO YOU    If YOU are experiencing a severe reaction / complication that you have never had before post procedure, call 911 or go to the nearest emergency room! All patients must have a  for transportation [place] regardless if you do or do not receive sedation. DISCHARGE SUMMARY from Nurse      PATIENT INSTRUCTIONS:    After Oral  or intravenous sedation, for 24 hours or while taking prescription Narcotics:  · Limit your activities  · Do not drive and operate hazardous machinery  · Do not make important personal or business decisions  · Do  not drink alcoholic beverages  · If you have not urinated within 8 hours after discharge, please contact your surgeon on call. Report the following to your surgeon:  · Excessive pain, swelling, redness or odor of or around the surgical area  · Temperature over 101  · Nausea and vomiting lasting longer than 4 hours or if unable to take medications  · Any signs of decreased circulation or nerve impairment to extremity: change in color, persistent  numbness, tingling, coldness or increase pain  · Any questions        What to do at Home:  Recommended activity: Activity as tolerated, NO DRIVING FOR 24 Hours post injection          *  Please give a list of your current medications to your Primary Care Provider. *  Please update this list whenever your medications are discontinued, doses are      changed, or new medications (including over-the-counter products) are added. *  Please carry medication information at all times in case of emergency situations. These are general instructions for a healthy lifestyle:    No smoking/ No tobacco products/ Avoid exposure to second hand smoke    Surgeon General's Warning:  Quitting smoking now greatly reduces serious risk to your health.     Obesity, smoking, and sedentary lifestyle greatly increases your risk for illness    A healthy diet, regular physical exercise & weight monitoring are important for maintaining a healthy lifestyle    You may be retaining fluid if you have a history of heart failure or if you experience any of the following symptoms:  Weight gain of 3 pounds or more overnight or 5 pounds in a week, increased swelling in our hands or feet or shortness of breath while lying flat in bed. Please call your doctor as soon as you notice any of these symptoms; do not wait until your next office visit. Recognize signs and symptoms of STROKE:    F-face looks uneven    A-arms unable to move or move unevenly    S-speech slurred or non-existent    T-time-call 911 as soon as signs and symptoms begin-DO NOT go       Back to bed or wait to see if you get better-TIME IS BRAIN.

## 2017-03-15 NOTE — INTERVAL H&P NOTE
H&P Update:  Oddis Paget Day was seen and examined. History and physical has been reviewed. The patient has been examined.  There have been no significant clinical changes since the completion of the originally dated History and Physical.    Signed By: Arturo Gant MD     March 15, 2017 8:35 AM

## 2017-03-17 ENCOUNTER — TELEPHONE (OUTPATIENT)
Dept: PAIN MANAGEMENT | Age: 67
End: 2017-03-17

## 2017-03-17 NOTE — TELEPHONE ENCOUNTER
Pt called requesting 2 liters of fluid to be ordered for home infusion next week. Have advised that provider is out of office until next week and then he would be consulted. She expressed understanding.

## 2017-03-20 NOTE — TELEPHONE ENCOUNTER
Pt called again requesting IV fluids. Have advised once again that provider is out of the office and will be consulted once he returns tomorrow. She expressed understanding.

## 2017-03-22 ENCOUNTER — TELEPHONE (OUTPATIENT)
Dept: PAIN MANAGEMENT | Age: 67
End: 2017-03-22

## 2017-03-22 NOTE — TELEPHONE ENCOUNTER
Received call from [phone]; patient is requesting another bag of iv fluids today ; please advise.

## 2017-03-22 NOTE — TELEPHONE ENCOUNTER
After consulting , continued IV fluids is not medically appropriate at this time due to pt's current medical status. [address] relays pt is running a temp of 101 with generalized malaise as well as dizziness and nausea. Pt advised via  to seek urgent care for medical evaluation. He expressed understanding.

## 2017-04-04 ENCOUNTER — OFFICE VISIT (OUTPATIENT)
Dept: PAIN MANAGEMENT | Age: 67
End: 2017-04-04

## 2017-04-04 VITALS
BODY MASS INDEX: 28.32 KG/M2 | DIASTOLIC BLOOD PRESSURE: 90 MMHG | HEART RATE: 108 BPM | SYSTOLIC BLOOD PRESSURE: 133 MMHG | WEIGHT: 145 LBS

## 2017-04-04 DIAGNOSIS — G89.4 CHRONIC PAIN SYNDROME: ICD-10-CM

## 2017-04-04 DIAGNOSIS — G24.3 SPASMODIC TORTICOLLIS: ICD-10-CM

## 2017-04-04 DIAGNOSIS — M47.812 CERVICAL FACET SYNDROME: ICD-10-CM

## 2017-04-04 DIAGNOSIS — R50.9 FUO (FEVER OF UNKNOWN ORIGIN): ICD-10-CM

## 2017-04-04 DIAGNOSIS — E07.9 THYROID DISEASE: ICD-10-CM

## 2017-04-04 DIAGNOSIS — M54.16 LUMBAR NEURITIS: ICD-10-CM

## 2017-04-04 DIAGNOSIS — D80.3 IGG DEFICIENCY (HCC): ICD-10-CM

## 2017-04-04 DIAGNOSIS — M79.7 FIBROMYALGIA: ICD-10-CM

## 2017-04-04 DIAGNOSIS — M47.812 CERVICAL SPONDYLOSIS WITHOUT MYELOPATHY: ICD-10-CM

## 2017-04-04 DIAGNOSIS — E53.8 VITAMIN B 12 DEFICIENCY: ICD-10-CM

## 2017-04-04 DIAGNOSIS — M96.1 LUMBAR POST-LAMINECTOMY SYNDROME: ICD-10-CM

## 2017-04-04 DIAGNOSIS — M48.02 CERVICAL SPINAL STENOSIS: ICD-10-CM

## 2017-04-04 DIAGNOSIS — Z79.899 ENCOUNTER FOR LONG-TERM CURRENT USE OF HIGH RISK MEDICATION: ICD-10-CM

## 2017-04-04 DIAGNOSIS — M47.816 LUMBAR FACET ARTHROPATHY: ICD-10-CM

## 2017-04-04 DIAGNOSIS — G43.719 INTRACTABLE CHRONIC MIGRAINE WITHOUT AURA AND WITHOUT STATUS MIGRAINOSUS: Primary | ICD-10-CM

## 2017-04-04 RX ORDER — METHYLPHENIDATE HYDROCHLORIDE 20 MG/1
20 TABLET ORAL 3 TIMES DAILY
Qty: 90 TAB | Refills: 0 | Status: SHIPPED | OUTPATIENT
Start: 2017-04-04 | End: 2017-05-04

## 2017-04-04 RX ORDER — CYANOCOBALAMIN 1000 UG/ML
1000 INJECTION, SOLUTION INTRAMUSCULAR; SUBCUTANEOUS ONCE
Qty: 1 ML | Refills: 0
Start: 2017-04-04 | End: 2017-04-04

## 2017-04-04 RX ORDER — OXYCODONE HYDROCHLORIDE 5 MG/1
5 TABLET ORAL
Qty: 120 TAB | Refills: 0 | Status: SHIPPED | OUTPATIENT
Start: 2017-04-04 | End: 2017-05-02 | Stop reason: SDUPTHER

## 2017-04-04 NOTE — PROGRESS NOTES
HISTORY OF PRESENT ILLNESS  Bri Ray is a 73 y.o. female. HPI she returns for follow-up of chronic, severe pain which is widespread and multifactorial  Since last seen, she underwent Botox injection for her chronic migraines. These have continued to be effective. The criteria for continuing Botox in the treatment of chronic migraine was reviewed with the patient with results as follows:  Continuing treatment with botulinum toxin injection for ongoing prevention of chronic migraine headaches is considered medically necessary when:  · Prior to Botox, what was the patient's baseline (number of migraines/headaches per  28+ dayMigraine headache frequency was reduced by at least 7 days per month (when compared to pre-treatment average) by the end of the initial trial;   · As a result of their ongoing Botox treatment, they have reduced by 15 days per month  It is clear that she fulfills the criteria for continuing Botox in the treatment for chronic migraine and this will be scheduled, 200 units, G4 3.548, 34544. She also underwent lumbar epidural injection at L4-5 which did not provide significant relief. On 3/17, she began experiencing fever and fatigue  On 3/22, her symptoms became so profound that she had to go to local emergency room, her white blood count was noted to be 25.6 and she was admitted to the hospital.  No clear etiology of her fever and elevated white count could be determined. She did see an infectious disease consultant while in the hospital.    Since that time she indicates she has not been doing well  She has been seen by her gastroenterologist who had her undergo an MRI of the liver looking for possible bile duct stones. The results are not available as of yet. She has also been suffering from persisting headaches.   A cervical epidural steroid injection will be arranged and consideration will also be given towards cervical medial branch blocks and radiofrequency ablation if the medial branch blocks prove successful. She has had a recent MRI done on 11/15/16. While in the hospital, she was taken off of Adderall and put on Ritalin which she actually feels is more effective than will be prescribed today. Pain level today 10 out of 10, outcome 19/28,(The lower the upper number, the better the outcome)  Physical activity and mobility, mood, and sleep are all poor. No reported side effects. She has also been given information with respect to the spinal cord stimulator. She is interested in pursuing these options once the etiology of her persisting low-grade fever can be elucidated. A current review of the  does not identify any inconsistency. Review of Systems   Constitutional: Positive for malaise/fatigue. Gastrointestinal: Positive for constipation. Neurological: Positive for sensory change (intermittent numbness right hand after use) and weakness (generalized). Psychiatric/Behavioral: The patient has insomnia. All other systems reviewed and are negative. Physical Exam   Constitutional: She is oriented to person, place, and time. No distress. HENT:   Head: Normocephalic and atraumatic. Right Ear: External ear normal.   Left Ear: External ear normal.   Nose: Nose normal.   Mouth/Throat: Oropharynx is clear and moist. No oropharyngeal exudate. Eyes: Conjunctivae and EOM are normal. Pupils are equal, round, and reactive to light. Right eye exhibits no discharge. Left eye exhibits no discharge. No scleral icterus. Neck: Normal range of motion. Neck supple. No thyromegaly present. Cardiovascular: Normal rate, regular rhythm and normal heart sounds. Pulmonary/Chest: Effort normal and breath sounds normal. No respiratory distress. She has no wheezes. She has no rales. She exhibits no tenderness. Abdominal: Soft. She exhibits no distension. There is no tenderness. There is no rebound and no guarding. Musculoskeletal: Normal range of motion. Neurological: She is alert and oriented to person, place, and time. She has normal reflexes. No cranial nerve deficit. She exhibits normal muscle tone. Coordination normal.   Skin: Skin is warm and dry. No rash noted. Psychiatric: She has a normal mood and affect. Her behavior is normal. Judgment and thought content normal.   Nursing note and vitals reviewed. ASSESSMENT and PLAN  Encounter Diagnoses   Name Primary?  Intractable chronic migraine without aura and without status migrainosus Yes    Spasmodic torticollis     Chronic pain syndrome     Lumbar facet arthropathy (HCC)     Cervical facet syndrome     FUO (fever of unknown origin)     Lumbar post-laminectomy syndrome     Encounter for long-term current use of high risk medication     Vitamin B 12 deficiency     Cervical spondylosis without myelopathy     Cervical spinal stenosis     IgG deficiency (HCC)     Fibromyalgia     Lumbar neuritis     Thyroid disease      Repeat Botox injection in early June as noted above. Treatment plan as noted above. 1 month reassess her    No concerns are raised for misuse, abuse, or diversion. 1. Pain medications are prescribed with the objective of pain relief and improved physical and psychosocial function in this patient. 2. Counseled patient on proper use of prescribed medications and reviewed opioid contract. 3. Counseled patient about chronic medical conditions and their relationship to anxiety and depression and recommended mental health support as needed. 4. Reviewed with patient self-help tools, home exercise, and lifestyle changes to assist the patient in self-management of symptoms. 5. Advised patient to have a primary care provider to continue care for health maintenance and general medical conditions and support for referral to specialty care as needed.   6. Reviewed with patient the treatment plan, goals of treatment plan, and limitations of treatment plan, to include the potential for side effects from medications and procedures. If side effects occur, it is the responsibility of the patient to inform the clinic so that a change in the treatment plan can be made in a safe manner. The patient is advised that stopping prescribed medication may cause an increase in symptoms and possible medication withdrawal symptoms. The patient is informed an emergency room evaluation may be necessary if this occurs. DISPOSITION: The patients condition and plan were discussed at length and all questions were answered. The patient agrees with the plan.     Counseling occupied > 50% of visit:  Total time: 40 minutes

## 2017-04-04 NOTE — PROGRESS NOTES
Nursing Notes    Patient presents to the office today in follow-up. Patient rates her pain at 10/10 on the numerical pain scale. Pt did not bring Rx for adderal XR filled 3/3/17, adderall IR filled 3/8/17, rafiq 5 filled 3/8/17, [place] filled 3/9/17. ; counseling done     Comments:     POC UDS was not performed in office today    Any new labs or imaging since last appointment? YES, MRI and blood work with hospitilization    Have you been to an emergency room (ER) or urgent care clinic since your last visit? YES  Continued fever      Have you been hospitalized since your last visit? YES   [facility]  If yes, where, when, and reason for visit? Have you seen or consulted any other health care providers outside of the [address] [place] [address]  since your last visit? YES     If yes, where, when, and reason for visit? Ms. Zander Ordonez has a reminder for a \"due or due soon\" health maintenance. I have asked that she contact her primary care provider for follow-up on this health maintenance.

## 2017-04-10 ENCOUNTER — TELEPHONE (OUTPATIENT)
Dept: PAIN MANAGEMENT | Age: 67
End: 2017-04-10

## 2017-04-10 NOTE — TELEPHONE ENCOUNTER
Pt called requesting another liter of fluids. Will route to provider as he is not in the office until Wed. She was advised of this via message.

## 2017-04-13 ENCOUNTER — TELEPHONE (OUTPATIENT)
Dept: PAIN MANAGEMENT | Age: 67
End: 2017-04-13

## 2017-04-13 NOTE — TELEPHONE ENCOUNTER
Called pt re pa for oxycontin - explained that insurance denied oxycontin because they require trial and failure of formulary medications. Told pt I did forward the list of meds to Dr Lawrence Morgan so he knows what she needs to try. Pt asked if she could get another med now. Told her I'm not sure or is she would have to wait til the next appt, but I would ask DH. Pt understood.

## 2017-04-13 NOTE — TELEPHONE ENCOUNTER
After consulting , pt can receive one liter of fluids again via home infusion.  questioned about her call earlier to Agustin in reference to her \"oxycontin\". He does not know what this is about so he was given Haylee's direct number to contact for clarification.

## 2017-04-17 RX ORDER — SODIUM CHLORIDE 0.9 % (FLUSH) 0.9 %
5-10 SYRINGE (ML) INJECTION AS NEEDED
Status: CANCELLED | OUTPATIENT
Start: 2017-04-18

## 2017-04-17 RX ORDER — MIDAZOLAM HYDROCHLORIDE 1 MG/ML
.5-6 INJECTION, SOLUTION INTRAMUSCULAR; INTRAVENOUS
Status: CANCELLED | OUTPATIENT
Start: 2017-04-18

## 2017-04-18 ENCOUNTER — APPOINTMENT (OUTPATIENT)
Dept: GENERAL RADIOLOGY | Age: 67
End: 2017-04-18
Attending: PHYSICAL MEDICINE & REHABILITATION
Payer: MEDICARE

## 2017-04-18 ENCOUNTER — TELEPHONE (OUTPATIENT)
Dept: ORTHOPEDIC SURGERY | Age: 67
End: 2017-04-18

## 2017-04-18 ENCOUNTER — HOSPITAL ENCOUNTER (OUTPATIENT)
Age: 67
Setting detail: OUTPATIENT SURGERY
Discharge: HOME | End: 2017-04-18
Attending: PHYSICAL MEDICINE & REHABILITATION | Admitting: PHYSICAL MEDICINE & REHABILITATION
Payer: MEDICARE

## 2017-04-18 VITALS
DIASTOLIC BLOOD PRESSURE: 87 MMHG | HEART RATE: 112 BPM | WEIGHT: 145 LBS | HEIGHT: 60 IN | BODY MASS INDEX: 28.47 KG/M2 | RESPIRATION RATE: 16 BRPM | SYSTOLIC BLOOD PRESSURE: 122 MMHG | OXYGEN SATURATION: 97 % | TEMPERATURE: 98.4 F

## 2017-04-18 PROCEDURE — 74011636320 HC RX REV CODE- 636/320

## 2017-04-18 PROCEDURE — 74011250636 HC RX REV CODE- 250/636

## 2017-04-18 PROCEDURE — 74011000250 HC RX REV CODE- 250

## 2017-04-18 PROCEDURE — 99152 MOD SED SAME PHYS/QHP 5/>YRS: CPT | Performed by: PHYSICAL MEDICINE & REHABILITATION

## 2017-04-18 PROCEDURE — 76010000009 HC PAIN MGT 0 TO 30 MIN PROC: Performed by: PHYSICAL MEDICINE & REHABILITATION

## 2017-04-18 RX ORDER — FENTANYL CITRATE 50 UG/ML
100 INJECTION, SOLUTION INTRAMUSCULAR; INTRAVENOUS ONCE
Status: CANCELLED | OUTPATIENT
Start: 2017-04-18 | End: 2017-04-18

## 2017-04-18 RX ORDER — LIDOCAINE HYDROCHLORIDE 10 MG/ML
INJECTION, SOLUTION EPIDURAL; INFILTRATION; INTRACAUDAL; PERINEURAL AS NEEDED
Status: DISCONTINUED | OUTPATIENT
Start: 2017-04-18 | End: 2017-04-18 | Stop reason: HOSPADM

## 2017-04-18 RX ORDER — FENTANYL CITRATE 50 UG/ML
INJECTION, SOLUTION INTRAMUSCULAR; INTRAVENOUS AS NEEDED
Status: DISCONTINUED | OUTPATIENT
Start: 2017-04-18 | End: 2017-04-18 | Stop reason: HOSPADM

## 2017-04-18 RX ORDER — BETAMETHASONE SODIUM PHOSPHATE AND BETAMETHASONE ACETATE 3; 3 MG/ML; MG/ML
INJECTION, SUSPENSION INTRA-ARTICULAR; INTRALESIONAL; INTRAMUSCULAR; SOFT TISSUE AS NEEDED
Status: DISCONTINUED | OUTPATIENT
Start: 2017-04-18 | End: 2017-04-18 | Stop reason: HOSPADM

## 2017-04-18 RX ORDER — MIDAZOLAM HYDROCHLORIDE 1 MG/ML
.5-6 INJECTION, SOLUTION INTRAMUSCULAR; INTRAVENOUS
Status: DISCONTINUED | OUTPATIENT
Start: 2017-04-18 | End: 2017-04-18 | Stop reason: HOSPADM

## 2017-04-18 RX ORDER — SODIUM CHLORIDE 0.9 % (FLUSH) 0.9 %
5-10 SYRINGE (ML) INJECTION AS NEEDED
Status: DISCONTINUED | OUTPATIENT
Start: 2017-04-18 | End: 2017-04-18 | Stop reason: HOSPADM

## 2017-04-18 RX ORDER — MIDAZOLAM HYDROCHLORIDE 1 MG/ML
INJECTION, SOLUTION INTRAMUSCULAR; INTRAVENOUS AS NEEDED
Status: DISCONTINUED | OUTPATIENT
Start: 2017-04-18 | End: 2017-04-18 | Stop reason: HOSPADM

## 2017-04-18 NOTE — TELEPHONE ENCOUNTER
Pt last seen 11/11/16; has f/u w/ [facility] 5/15/17:    Pt called wanting to be worked in sooner than  5/15/17. Pt reports headache/heart racing/lungs burning. I strongly suggested/urged the patient to go to the E/R of choice. Pt stated she feels as though her body is rejecting the hardware from her sx w/michelle filippo several years ago. .. I told the patient I would put her on the cancellation list to call her should an earlier opening become available--again told the patient I cannot discuss her heart or lung issues as that is a different specialty and she needs to go to the E/R. Pt understood  Please call pt to further advise.  Confirmed the pt's p#. please call her at [phone]

## 2017-04-18 NOTE — DISCHARGE INSTRUCTIONS
[facility]      Post Procedures Instructions    *Resume Diet and Activity as tolerated. Rest for the remainder of the day. *You may fell worse before you feel better as the numbing medications wear off before the steroids take effect if used for your procedures. *Do not use affected extremity until numbness or loss of sensation has completely resolved without assistance. *DO NOT DRIVE, operate machinery/heavey equipment for 24 hours. *DO NOT DRINK ALCOHOL for 24 hours as it may interact with the sedation if you received it and also thins your blood and may cause you to bleed. *WAIT 24 hours before starting back ANY Blood thinning medications:   (Heparin, Coumadin, Warfarin, Lovenox, Plavix, Aggrenox)    *Resume Pre-Procedure Medications as prescribed except Blood Thinners unless directed by your Physician or Cardiologist.     *Avoid Hot tubs and Heating pad for 24 hours to prevent dissipation of medications, you may shower to remove bandages and remaining prep residue on the skin. * If you develop a Headache, drink plenty of fluids including beverages with caffeine (Coffee, Mt. Dew etc.) and rest.  If the headache persists longer than 24 hoursor intensifies - Please call [facility] [phone]    * If you are DIABETIC, check your blood sugar three times a day for the next three days, the steroids will increase your blood sugar. If your blood sugar is greater than 400 have someone drive you to the nearest [address]. * If you experience any of the following problems, call the [facility] [phone] between 8:00 am - 4:30pm or After Hours [phone].     Shortness of breath    Fever of 101 F or higher    Nausea / Vomiting (not normal to you)    Increasing stiffness in the neck    Weakness or numbness in the arms or legs that is not resolving    Prolonged and increasing pain > than 4 days    ANYTHING OUT of the ORDINARY TO YOU    If YOU are experiencing a severe reaction / complication that you have never had before post procedure, call 911 or go to the nearest emergency room! All patients must have a  for transportation [place] regardless if you do or do not receive sedation. DISCHARGE SUMMARY from Nurse      PATIENT INSTRUCTIONS:    After Oral  or intravenous sedation, for 24 hours or while taking prescription Narcotics:  · Limit your activities  · Do not drive and operate hazardous machinery  · Do not make important personal or business decisions  · Do  not drink alcoholic beverages  · If you have not urinated within 8 hours after discharge, please contact your surgeon on call. Report the following to your surgeon:  · Excessive pain, swelling, redness or odor of or around the surgical area  · Temperature over 101  · Nausea and vomiting lasting longer than 4 hours or if unable to take medications  · Any signs of decreased circulation or nerve impairment to extremity: change in color, persistent  numbness, tingling, coldness or increase pain  · Any questions        What to do at Home:  Recommended activity: Activity as tolerated, NO DRIVING FOR 24 Hours post injection          *  Please give a list of your current medications to your Primary Care Provider. *  Please update this list whenever your medications are discontinued, doses are      changed, or new medications (including over-the-counter products) are added. *  Please carry medication information at all times in case of emergency situations. These are general instructions for a healthy lifestyle:    No smoking/ No tobacco products/ Avoid exposure to second hand smoke    Surgeon General's Warning:  Quitting smoking now greatly reduces serious risk to your health.     Obesity, smoking, and sedentary lifestyle greatly increases your risk for illness    A healthy diet, regular physical exercise & weight monitoring are important for maintaining a healthy lifestyle    You may be retaining fluid if you have a history of heart failure or if you experience any of the following symptoms:  Weight gain of 3 pounds or more overnight or 5 pounds in a week, increased swelling in our hands or feet or shortness of breath while lying flat in bed. Please call your doctor as soon as you notice any of these symptoms; do not wait until your next office visit. Recognize signs and symptoms of STROKE:    F-face looks uneven    A-arms unable to move or move unevenly    S-speech slurred or non-existent    T-time-call 911 as soon as signs and symptoms begin-DO NOT go       Back to bed or wait to see if you get better-TIME IS BRAIN.

## 2017-04-18 NOTE — INTERVAL H&P NOTE
H&P Update:  Keyshawn Course Day was seen and examined. History and physical has been reviewed. The patient has been examined.  There have been no significant clinical changes since the completion of the originally dated History and Physical.    Signed By: Gabriel Yang MD     April 18, 2017 8:56 AM

## 2017-04-18 NOTE — PERIOP NOTES
Patient tolerated procedure well. No complications noted. VSS. No redness, swelling, or bleeding from injection site. Dressing dry and intact. Patient wheeled to recovery room.

## 2017-04-18 NOTE — H&P (VIEW-ONLY)
Nursing Notes    Patient presents to the office today in follow-up. Patient rates her pain at 10/10 on the numerical pain scale. Pt did not bring Rx for adderal XR filled 3/3/17, adderall IR filled 3/8/17, rafiq 5 filled 3/8/17, Anay Jen filled 3/9/17. ; counseling done     Comments:     POC UDS was not performed in office today    Any new labs or imaging since last appointment? YES, MRI and blood work with hospitilization    Have you been to an emergency room (ER) or urgent care clinic since your last visit? YES  Continued fever      Have you been hospitalized since your last visit? YES   [facility]  If yes, where, when, and reason for visit? Have you seen or consulted any other health care providers outside of the [address] [place] [address]  since your last visit? YES     If yes, where, when, and reason for visit? Ms. Rosa Maria Mejias has a reminder for a \"due or due soon\" health maintenance. I have asked that she contact her primary care provider for follow-up on this health maintenance.

## 2017-04-18 NOTE — TELEPHONE ENCOUNTER
Called pt to follow up on message. No answer, no voice mail prompt. Pt had injection done with Dr. Reynaldo Cortes today.

## 2017-04-18 NOTE — PROCEDURES
[facility]    INTERLAMINAR CERVICAL EPIDURAL STEROID INJECTION  PROCEDURE REPORT      PATIENT:  Paola Ray  YOB: 1950  DATE OF SERVICE:  4/18/2017  [facility]    PRE-PROCEDURE DIAGNOSIS:  See Above    POST-PROCEDURE DIAGNOSIS:  See Above                PROCEDURE:    1. Interlaminar cervical epidural steroid injection, C7-T1 (85502)  2. Fluoroscopic needle guidance (spinal) (97619)  3. Supervision of moderate sedation (97821)    ANESTHESIA:  Local with moderate IV sedation. See Medication Administration Record for specific medications and dosage. COMPLICATIONS: None. PHYSICIAN:  Ericka Frias MD    PRE-PROCEDURE NOTE:  Pre-procedural assessment of the patient was performed including a limited history and physical examination. The details of the procedure were discussed with the patient, including the risks, benefits and alternative options and an informed consent was obtained. The patients NPO status, if necessary for the specific procedure and/or administration of moderate intravenous sedation, if utilized, and availability of a responsible adult to escort the patient following the procedure were confirmed. A peripheral intravenous cannula was placed without difficulty and lactated Ringers solution administered. See nursing notes for details. PROCEDURE NOTE:  The patient was brought to the procedure suite and positioned on the fluoroscopy table in the prone position. Physiologic monitors were applied and supplemental oxygen was administered via nasal cannula. The skin was prepped in the standard surgical fashion and sterile drapes were applied over the procedure site.  Please refer to the Flowsheet for documentation of the patients vital signs and the Medication Administration Record for any oral and/or intravenous sedation administered prior to or during the procedure. The above-listed interlaminar space was identified and the skin and subcutaneous tissues were infiltrated with 1% Lidocaine. Under anterior-posterior fluoroscopic guidance an 18g, 3.5-inch Tuohy epidural needle was advanced along the previously identified interlaminar space. The fluoroscope was then turned lateral view and a loss of resistance syringe was attached to the needle containing preservative free normal saline. Under lateral flouroscopic guidance, the needle was then advanced through the ligamentum flavum, entering the epidural space with a clear and crisp loss of resistance. The needle was not advanced beyond the interlaminar line at any time during this process. No CSF was noted. Aspiration was negative for blood or CSF. Additional confirmation was made with the injection of 0.5 mL of a nonionic water-soluble radiographic contrast medium (Isovue-M 200) demonstrating appropriate epidural spread and the absence of vascular uptake. Following this, a 3 mL solution comprised of 2 mL of betamethasone (6 mg per mL) and 1 mL of lidocaine 1% preservative free was injected slowly after negative aspiration. The needle was cleared of steroid solution and removed. The area was thoroughly cleaned and sterile bandages applied as necessary. The patient tolerated the procedure well and vital signs remained stable throughout the procedure. POST-PROCEDURE COURSE:  The patient was escorted from the procedure suite in satisfactory condition and recovered per facility protocol based on the type of procedure performed and/or the sedation utilized. The patient did not experience any adverse events and remained hemodynamically stable during the post-procedure period. DISCHARGE NOTE:  Upon discharge, the patient was able to tolerate fluids and was in no acute distress. The patient was oriented to person, place and time and vital signs were stable.  Appropriate post-procedure instructions were provided and explained to the patient in detail and all questions were answered.     Nathaniel Mendoza MD 4/18/2017 10:34 AM

## 2017-04-19 ENCOUNTER — OFFICE VISIT (OUTPATIENT)
Dept: PAIN MANAGEMENT | Age: 67
End: 2017-04-19

## 2017-04-19 VITALS
HEART RATE: 102 BPM | DIASTOLIC BLOOD PRESSURE: 78 MMHG | WEIGHT: 145 LBS | BODY MASS INDEX: 28.32 KG/M2 | SYSTOLIC BLOOD PRESSURE: 140 MMHG | TEMPERATURE: 98.9 F

## 2017-04-19 DIAGNOSIS — M47.817 LUMBOSACRAL SPONDYLOSIS WITHOUT MYELOPATHY: ICD-10-CM

## 2017-04-19 DIAGNOSIS — M48.02 CERVICAL SPINAL STENOSIS: ICD-10-CM

## 2017-04-19 DIAGNOSIS — Z98.1 S/P LUMBAR FUSION: ICD-10-CM

## 2017-04-19 DIAGNOSIS — M51.37 DEGENERATION OF LUMBAR OR LUMBOSACRAL INTERVERTEBRAL DISC: ICD-10-CM

## 2017-04-19 DIAGNOSIS — M47.812 CERVICAL FACET SYNDROME: ICD-10-CM

## 2017-04-19 DIAGNOSIS — M96.1 LUMBAR POST-LAMINECTOMY SYNDROME: ICD-10-CM

## 2017-04-19 DIAGNOSIS — M47.812 CERVICAL SPONDYLOSIS WITHOUT MYELOPATHY: Primary | ICD-10-CM

## 2017-04-19 DIAGNOSIS — G89.4 CHRONIC PAIN SYNDROME: ICD-10-CM

## 2017-04-19 DIAGNOSIS — D80.3 IGG DEFICIENCY (HCC): ICD-10-CM

## 2017-04-19 DIAGNOSIS — M43.16 SPONDYLOLISTHESIS OF LUMBAR REGION: ICD-10-CM

## 2017-04-19 RX ORDER — ZOLMITRIPTAN 5 MG/1
TABLET, FILM COATED ORAL
Qty: 54 TAB | Refills: 3 | Status: SHIPPED | OUTPATIENT
Start: 2017-04-19 | End: 2017-05-02 | Stop reason: SDUPTHER

## 2017-04-19 NOTE — TELEPHONE ENCOUNTER
Spoke with patient, went to [facility] ED yesterday and was given fluids, and labs came back ok she stated. She also stated they believed her sugar was just too low.  Patient said she is feeling a bit better, and plans to keep 5/15/17 appt

## 2017-04-19 NOTE — TELEPHONE ENCOUNTER
Received call from pt's pharmacy requesting refills on pt's Zomig. Have consulted  and med approved.

## 2017-04-20 NOTE — PROGRESS NOTES
[facility]  Interventional Pain Management Consultation History & Physical    PATIENT NAME:  Marlow Babinski A Day     YOB: 1950    DATE OF SERVICE:   4/19/2017      CHIEF COMPLAINT:  Follow-up      HISTORY OF PRESENT ILLNESS:   Marlow Babinski A Day presents to the pain clinic today for follow on evaluation and to consider interventional pain management options as indicated for the type and location of the pain the patient is presenting with. Mariah Garcia Day I have asked patient to come into the clinic as a walk-in for reevaluation, 1 day after I performed an uneventful cervical epidural steroid injection with light IV conscious sedation on her. She states that the epidural procedure at her cervical spine area went very well for her. She is having no significant neck pain or radiating arm pain. She feels that the cervical epidural steroid injection is helping her already. She feels no ill effects from this procedure. In the interim 24 hours, patient was having a lot of dizziness, ongoing temperature elevation, and generally felt uneasy. She presented to a nearby emergency room later in the afternoon after her cervical epidural steroid procedure. Several diagnostic studies were taken at [facility] ER. Urinalysis was benign, CBC was remarkable only for slightly elevated blood sugar 153 elevated BUN 24 remainder of the labs were normal.  This is all April 18, 2017. Same day as the procedure. D-dimer was normal troponin was less than 0.01 which was normal.  EKG showed a heart rate of 118 remarkable for sinus tachycardia. Chest x-ray did not show any acute infiltration or process. Patient was subsequently discharged. Patient has been complaining of increasing lumbosacral pain and tenderness with radiation to the hip areas. She is having some vague chest pain as well as left breast pain. She relates that she has had several breast masses removed. Patient relates that she feels very fatigued lethargic tired and run down. She is having spikes of her temperature over the past many months, she takes Tylenol for this. She relates to having had migraines in the past 2-3 migraines daily for 16 weeks. Since her epidural steroid injections, she has not had any more migraines. She says this is wonderful. 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 Patient is being followed by many medical specialists for her ongoing symptoms of elevated temperature, abdominal pain, lumbosacral spinal pain, etc. she has been seen by several infectious diseases specialist with no definitive etiology as to the patient's ongoing temperature spikes. She is followed by GI doctors for her abdominal pain and symptoms. I believe she is also seen by oncologist as well as hematologist, she receives monthly IgG infusions for her immune deficiency. She has been extensively worked up in the past and no definitive etiology for her elevated temperature has been determined. Patient will see orthopedic spine specialist I believe next week. Dr. Ester Turk had previously ordered labeled white cell scan. This was bone scan completed June 17, 2016. This shows marked increase in isotope activity and bone scan at the upper lumbar level likely corresponding to significant degenerative disease at T12-L1 with reactive sclerosis mild subchondral erosions of the inferior endplate of Q68 and 4 mm posterior offset of T12 with regard to L1. Also increased activity on bone scan L5-S1 with greater activity on the left at L5-S1. Patchy sclerosis surrounding the pedicle screw at L5 and S1 on the left. Low-grade infection cannot be excluded per imaging report. I am sure Dr. Donte Martinez and his staff have reviewed these results with the patient. We discussed options.   Patient is a very complicated patient with many pain complaints and comorbid medical issues. She continues to apparently have this fever of unknown origin despite evaluations by several infectious diseases specialist.  She is further seen by endocrinologist, hematologist, oncologist, internist, in addition to my colleagues at orthopedic spine and chronic pain management. She apparently has no complications from recent cervical epidural steroid injection that I performed on her yesterday. I certainly cannot explain her ongoing symptoms. She has been exhaustively worked up. I have recommended she continue to follow-up with her other providers, as these other providers certainly have more expertise at managing this complicated although very pleasant lady's comorbid conditions and symptoms. She appears stable to me at this evaluation and time. MRI: Discussed imaging    PROCEDURES: Discussed results of cervical epidural steroid injection    MRI Results (most recent):    Results from Abstract encounter on 04/17/17   MRI ABD W WO CONT        PAST MEDICAL HISTORY:   The patient  has a past medical history of ADD (attention deficit disorder); Arthritis; Chronic low back pain; Cold hands and feet; Depression; Esophageal reflux; Fall at home; Fatigue; Fibromyalgia; GERD (gastroesophageal reflux disease); H/O dehydration; Headache; Hiatal hernia (2004); Hyperlipidemia; Hypertension; Hypoglycemia; IgG deficiency ([address] Lumbago; Migraine; Nausea & vomiting; Pain in joint, pelvic region and thigh; Painful sex; Poor appetite (2001); S/P lumbar fusion (11/17/2015); Seizures ([address] (2013); Sinus infection; SOB (shortness of breath); Spinal stenosis, lumbar region, without neurogenic claudication (10/19/2015); Strep throat; Swallowing difficulty; Swelling; Thoracic or lumbosacral neuritis or radiculitis, unspecified; Thyroid disease; Thyroid disease; and Tortuous colon.     PAST SURGICAL HISTORY:   The patient  has a past surgical history that includes breast augmentation; hysterectomy; cholecystectomy; pelvic laparoscopy; heent (4/2014); orthopaedic (11/16/15); other surgical; and lumbar fusion (11-16-15). CURRENT MEDICATIONS:   The patient has a current medication list which includes the following prescription(s): zolmitriptan, OTHER, oxycodone ir, methylphenidate, flector, oxycodone er, pseudoephedrine, sucralfate, cyclosporine, calcium, acetaminophen, omega-3s/dha/epa/fish oil, therapeutic multivitamin, levocetirizine, bupropion, ergocalciferol, estradiol, levothyroxine, lorazepam, lisinopril, pregnenolone, syringe (disposable), needle (disp) 27 g, immune globulin 10% (human), OTHER, and cyanocobalamin. ALLERGIES:     Allergies   Allergen Reactions    Avelox [Moxifloxacin] Other (comments)     Other reaction(s): other/intolerance  Seizures  Nerve pain      Azithromycin Rash and Other (comments)     Other reaction(s): unknown  Pt states is not allergic to this med  Acute toxic reaction    Bactrim [Sulfamethoprim Ds] Other (comments)     Severe abdominal pain    Bupropion Other (comments)     Muscle pains  All antidepressants.      Ciprofloxacin Other (comments)     Other reaction(s): other/intolerance  Lowers bp  AMS    Duloxetine Other (comments)     \"shocks in brain\"    Focalin [Dexmethylphenidate] Other (comments)     Pt denies any allergic    Keflex [Cephalexin] Other (comments)     Abdominal pain      Macrobid [Nitrofurantoin Monohyd/M-Cryst] Swelling     Other reaction(s): other/intolerance  Swelling of colon  Other reaction(s): other/intolerance  Swelling of colon    Other Medication Other (comments)     Dissolving sutures    Phenergan [Promethazine] Other (comments)     Other reaction(s): neurological reaction  \" i see things\"  hullucinations    Sucralfate Myalgia     Patient able to tolerate now     Sulfa (Sulfonamide Antibiotics) Hives    Sulfate Salt Unknown (comments)    Venlafaxine Itching    Yeast, Dried Other (comments) and Hives     Patient reported extreme lethargy, bloating/abdominal pain, and digestive problems when consumes yeast or foods containing yeast       FAMILY HISTORY:   The patient family history includes Cancer in her mother; Heart Disease in her father; Hypertension in her mother. SOCIAL HISTORY:   The patient  reports that she has quit smoking. She has never used smokeless tobacco. The patient  reports that she does not drink alcohol. She also  reports that she does not use illicit drugs. REVIEW OF SYSTEMS:   The patient denies chills, weight loss (Constitutional), rash, itching (Skin), tinnitus, congestion (HENT), blurred vision, photophobia (Eyes), palpitations, orthopnea (Cardiovascular), hemoptysis, wheezing (Respiratory), nausea, vomiting, diarrhea (Gastrointestinal), dysuria, hematuria, urgency (Genitourinary), easy bruising, bleeding abnormalities (Hematologic), bowel or bladder incontinence, loss of consciousness (Neurologic), suicidal or homicidal ideation or hallucinations (Psychiatric). PHYSICAL EXAM:  VS:   Visit Vitals    /78    Pulse (!) 102    Temp 98.9 °F (37.2 °C)    Wt 65.8 kg (145 lb)    BMI 28.32 kg/m2     General: Rather ill appearing, although also apparently stable appearing. Body habitus consistent with recorded height and weight and the calculated BMI. Apparent distress due to a number of diffuse pain complaints. Head: Normocephalic, atraumatic. Skin: Inspection of the skin reveals no rashes, lesions or infection. CV: Regular rate. No murmurs or rubs noted. No peripheral edema noted. Pulm: Respirations are even and unlabored. Extr: No clubbing, cyanosis, or edema noted. Musculoskeletal:  1. Cervical spine -decreased ROM. Mild paraspinous tenderness bilaterally . There is no scoliosis, asymmetry, or musculoskeletal defect. 2. Thoracic spine - Full ROM. No paraspinous tenderness at any level. There is no scoliosis, asymmetry, or musculoskeletal defect.   3. Lumbar spine -decreased range of motion . Paraspinous tenderness at lower lumbar levels bilaterally . SI joints are nontender bilaterally. There is no scoliosis, asymmetry, or musculoskeletal defect. 4. Right upper extremity - Full ROM. 5/5 muscle strength in all muscle groups. No pain or tenderness in shoulder, elbow, wrist, or hand. 5. Left upper extremity - Full ROM. 5/5 muscle strength in all muscle groups. No pain or tenderness in shoulder, elbow, wrist, or hand. 6. Right lower extremity - Full ROM. 5/5 muscle strength in all muscle groups. No pain, tenderness, or swelling in the hip, knee, ankle or foot. 7. Left lower extremity - Full ROM. 5/5 muscle strength in all muscle groups. No pain, tenderness, or swelling in the hip, knee, ankle or foot. Neurological:  1. Mental Status - Alert, awake and oriented. Speech is clear and appropriate. 2. Cranial Nerves - Extraocular muscles intact bilaterally. Cranial nerves II-XII grossly intact bilaterally. 3. Gait - non-antalgic   4. Reflexes - 2+ and symmetric throughout. 5. Sensation - Intact to light touch and pin prick. 6. Provocative Tests - Spurlings negative bilaterally. Straight leg raise negative bilaterally. Psychological:  1. Mood and affect - Appropriate. 2. Speech - Appropriate. 3. Though content - Appropriate. 4. Judgment - Appropriate. ASSESSMENT:      ICD-10-CM ICD-9-CM    1. Cervical spondylosis without myelopathy M47.812 721.0    2. Cervical spinal stenosis M48.02 723.0    3. Cervical facet syndrome M12.88 723.8    4. Chronic pain syndrome G89.4 338.4    5. Lumbosacral spondylosis without myelopathy M47.817 721.3    6. Degeneration of lumbar or lumbosacral intervertebral disc M51.37 722.52    7. Spondylolisthesis of lumbar region M43.16 738.4    8. S/P lumbar fusion Z98.1 V45.4    9. Lumbar post-laminectomy syndrome M96.1 722.83    10. IgG deficiency ([facility] D80.3 279.03            PLAN:    1.     Diagnoses/Plan: Cervical spondylosis, cervical spinal stenosis, cervical facet syndrome, chronic pain syndrome. Lumbosacral spondylosis, lumbar degenerative disc disease, lumbar spondylolisthesis. IgG deficiency. We discussed options. Patient is a very complicated patient with many pain complaints and comorbid medical issues. She continues to apparently have this fever of unknown origin despite evaluations by several infectious diseases specialist.  She is further seen by endocrinologist, hematologist, oncologist, internist, in addition to my colleagues at orthopedic spine and chronic pain management. She apparently has no complications from recent cervical epidural steroid injection that I performed on her yesterday. I certainly cannot explain her ongoing symptoms. She has been exhaustively worked up. I have recommended she continue to follow-up with her other providers, as these other providers certainly have more expertise at managing this complicated although very pleasant lady's comorbid conditions and symptoms. She appears stable to me at this evaluation and time. 2.    I have thoroughly discussed the risks and benefits, side effects and complications, of any and all procedures that were mentioned at today's patient visit. I have used a skeleton model for added emphasis and patient education. I have answered all questions, and I have obtained verbal confirmation for all procedures planned with the patient. 3.    I have reviewed in great detail today the patient's MRI and other imaging studies with the patient. I have explained to the patient their condition using both actual recent and relevant images. I have used a skeleton model for added emphasis as well as patient education. 4.    I have advised patient to have a primary care provider to continue care for health maintenance and general medical conditions and support for referral to specialty care as needed.   5.    I have reviewed with patient the treatment plan, goals of treatment plan, and limitations of treatment plan, to include the potential for side effects from medications and procedures. If side effects occur, it is the responsibility of the patient to inform the clinic so that a change in the treatment plan can be made in a safe manner. The patient is advised that stopping prescribed medication may cause an increase in symptoms and possible medication withdrawal symptoms. The patient is informed an emergency room evaluation may be necessary if this occurs. DISPOSITION:   The patients condition and plan were discussed at length and all questions were answered. The patient agrees with the plan. A total of 25 minutes was spent with the patient of which over half of the time was spent counseling the patient. Lori Cisse MD 4/20/2017 5:35 PM    Note: Although these clinic notes were documented by the provider at the time of the exam, they have not been proofed and are subject to transcription variance.

## 2017-04-24 ENCOUNTER — TELEPHONE (OUTPATIENT)
Dept: PAIN MANAGEMENT | Age: 67
End: 2017-04-24

## 2017-04-24 NOTE — TELEPHONE ENCOUNTER
Pt's  called requesting call back regarding his wife. He is asking for referral to [facility]. He was advised by Dr. Samantha Shipman that she might need to go to [facility] or [facility] as their testing was all normal. They did not clarify what she would be seen for however and will not order the referral. Have advised him, as pt was as well, that pt has to have a PCP for overseeing her care. He is in agreement and will talk with her about this again. Have advised that he will need to contact his insurance to see if a referral is needed. Will consult  to see what he might suggest and return his call.

## 2017-04-25 RX ORDER — ESTRADIOL 0.1 MG/D
FILM, EXTENDED RELEASE TRANSDERMAL
Qty: 24 PATCH | Refills: 3 | Status: SHIPPED | OUTPATIENT
Start: 2017-04-25 | End: 2017-05-02 | Stop reason: SDUPTHER

## 2017-04-25 RX ORDER — LISINOPRIL 20 MG/1
TABLET ORAL
Qty: 180 TAB | Refills: 3 | Status: SHIPPED | OUTPATIENT
Start: 2017-04-25 | End: 2017-10-16 | Stop reason: ALTCHOICE

## 2017-05-02 ENCOUNTER — OFFICE VISIT (OUTPATIENT)
Dept: PAIN MANAGEMENT | Age: 67
End: 2017-05-02

## 2017-05-02 ENCOUNTER — DOCUMENTATION ONLY (OUTPATIENT)
Dept: PAIN MANAGEMENT | Age: 67
End: 2017-05-02

## 2017-05-02 VITALS
SYSTOLIC BLOOD PRESSURE: 115 MMHG | HEART RATE: 84 BPM | BODY MASS INDEX: 28.32 KG/M2 | WEIGHT: 145 LBS | TEMPERATURE: 97.1 F | DIASTOLIC BLOOD PRESSURE: 76 MMHG

## 2017-05-02 DIAGNOSIS — Z79.899 ENCOUNTER FOR LONG-TERM (CURRENT) USE OF HIGH-RISK MEDICATION: ICD-10-CM

## 2017-05-02 DIAGNOSIS — M54.16 LUMBAR NEURITIS: ICD-10-CM

## 2017-05-02 DIAGNOSIS — G43.719 INTRACTABLE CHRONIC MIGRAINE WITHOUT AURA AND WITHOUT STATUS MIGRAINOSUS: ICD-10-CM

## 2017-05-02 DIAGNOSIS — R53.82 CHRONIC FATIGUE: ICD-10-CM

## 2017-05-02 DIAGNOSIS — E07.9 THYROID DISEASE: ICD-10-CM

## 2017-05-02 DIAGNOSIS — D80.3 IGG DEFICIENCY (HCC): ICD-10-CM

## 2017-05-02 DIAGNOSIS — F51.04 CHRONIC INSOMNIA: ICD-10-CM

## 2017-05-02 DIAGNOSIS — R50.9 FUO (FEVER OF UNKNOWN ORIGIN): ICD-10-CM

## 2017-05-02 DIAGNOSIS — Z79.899 ENCOUNTER FOR LONG-TERM CURRENT USE OF HIGH RISK MEDICATION: ICD-10-CM

## 2017-05-02 DIAGNOSIS — M79.7 FIBROMYALGIA: ICD-10-CM

## 2017-05-02 DIAGNOSIS — M48.02 CERVICAL SPINAL STENOSIS: ICD-10-CM

## 2017-05-02 DIAGNOSIS — M96.1 LUMBAR POST-LAMINECTOMY SYNDROME: ICD-10-CM

## 2017-05-02 DIAGNOSIS — E53.8 VITAMIN B 12 DEFICIENCY: Primary | ICD-10-CM

## 2017-05-02 DIAGNOSIS — E03.9 ACQUIRED HYPOTHYROIDISM: ICD-10-CM

## 2017-05-02 DIAGNOSIS — M47.817 LUMBOSACRAL SPONDYLOSIS WITHOUT MYELOPATHY: ICD-10-CM

## 2017-05-02 RX ORDER — CYANOCOBALAMIN 1000 UG/ML
1000 INJECTION, SOLUTION INTRAMUSCULAR; SUBCUTANEOUS ONCE
Qty: 1 ML | Refills: 0
Start: 2017-05-02 | End: 2017-05-02

## 2017-05-02 RX ORDER — LEVOTHYROXINE SODIUM 112 UG/1
112 TABLET ORAL
Qty: 90 TAB | Refills: 3 | Status: SHIPPED | OUTPATIENT
Start: 2017-05-02 | End: 2017-07-31

## 2017-05-02 RX ORDER — ESTRADIOL 0.1 MG/D
FILM, EXTENDED RELEASE TRANSDERMAL
Qty: 24 PATCH | Refills: 3 | Status: SHIPPED | OUTPATIENT
Start: 2017-05-02 | End: 2017-08-22

## 2017-05-02 RX ORDER — DEXTROAMPHETAMINE SACCHARATE, AMPHETAMINE ASPARTATE, DEXTROAMPHETAMINE SULFATE AND AMPHETAMINE SULFATE 7.5; 7.5; 7.5; 7.5 MG/1; MG/1; MG/1; MG/1
30 TABLET ORAL 3 TIMES DAILY
Qty: 90 TAB | Refills: 0 | Status: SHIPPED | OUTPATIENT
Start: 2017-05-02 | End: 2017-06-02 | Stop reason: SDUPTHER

## 2017-05-02 RX ORDER — ZOLMITRIPTAN 5 MG/1
TABLET, FILM COATED ORAL
Qty: 54 TAB | Refills: 3 | Status: SHIPPED | OUTPATIENT
Start: 2017-05-02

## 2017-05-02 RX ORDER — OXYCODONE HYDROCHLORIDE 5 MG/1
5 TABLET ORAL
Qty: 120 TAB | Refills: 0 | Status: SHIPPED | OUTPATIENT
Start: 2017-05-02 | End: 2017-06-05 | Stop reason: SDUPTHER

## 2017-05-02 RX ORDER — LEVOCETIRIZINE DIHYDROCHLORIDE 5 MG/1
5 TABLET, FILM COATED ORAL
Qty: 30 TAB | Refills: 5 | Status: SHIPPED | OUTPATIENT
Start: 2017-05-02 | End: 2017-08-22

## 2017-05-02 NOTE — PROGRESS NOTES
HISTORY OF PRESENT ILLNESS  Anum Ray is a 73 y.o. female. HPI  she returns for follow-up of chronic, severe pain which is widespread and multifactorial  Since last seen, she continues to suffer from chronic dehydration and fever. She is scheduled to see infectious disease on May 3. The possibility of bone marrow aspirate, biopsy, and culture has been discussed. She is also scheduled to follow-up with her spine surgeon. She presents today with her  and brings in multiple handouts pertaining to excessive urination as well as her recent ER visit at which time she received IV fluids. The possibility of her rectocele causing excessive urination was discussed as well as the possibilities for nephrology and/or urology consultation  She recently underwent an MRCP and liver function testing as per her gastroenterologist which were normal.  The possibility of obtaining a second opinion at either [facility] or [facility] was raised. Both she and her  are less than inclined to pursue this option at the present time noting that they had been to [facility] in the past for an unrelated problem and did not find satisfactory results. Her pain continues to be controlled with the use of periodic oxycodone 5 mg tablets and this will be continued. Physical activity and mobility, mood, and sleep continue to be significantly curtailed. Review of Systems   Constitutional: Positive for malaise/fatigue. Gastrointestinal: Positive for constipation. Neurological: Positive for sensory change (intermittent numbness right hand after use) and weakness (generalized). Psychiatric/Behavioral: The patient has insomnia. All other systems reviewed and are negative. Physical Exam   Constitutional: She is oriented to person, place, and time. No distress. HENT:   Head: Normocephalic and atraumatic.    Right Ear: External ear normal.   Left Ear: External ear normal.   Nose: Nose normal.   Mouth/Throat: Oropharynx is clear and moist. No oropharyngeal exudate. Eyes: Conjunctivae and EOM are normal. Pupils are equal, round, and reactive to light. Right eye exhibits no discharge. Left eye exhibits no discharge. No scleral icterus. Neck: Normal range of motion. Neck supple. No thyromegaly present. Cardiovascular: Normal rate, regular rhythm and normal heart sounds. Pulmonary/Chest: Effort normal and breath sounds normal. No respiratory distress. She has no wheezes. She has no rales. She exhibits no tenderness. Abdominal: Soft. She exhibits no distension. There is no tenderness. There is no rebound and no guarding. Musculoskeletal: Normal range of motion. Neurological: She is alert and oriented to person, place, and time. She has normal reflexes. No cranial nerve deficit. She exhibits normal muscle tone. Coordination normal.   Skin: Skin is warm and dry. No rash noted. Psychiatric: She has a normal mood and affect. Her behavior is normal. Judgment and thought content normal.   Nursing note and vitals reviewed. ASSESSMENT and PLAN  Encounter Diagnoses   Name Primary?  Vitamin B 12 deficiency Yes    Lumbar neuritis     Thyroid disease     Intractable chronic migraine without aura and without status migrainosus     Chronic fatigue     Fibromyalgia     Encounter for long-term (current) use of high-risk medication     Chronic insomnia     Acquired hypothyroidism     Lumbosacral spondylosis without myelopathy     IgG deficiency (HCC)     Cervical spinal stenosis     Encounter for long-term current use of high risk medication     Lumbar post-laminectomy syndrome     FUO (fever of unknown origin)      Treatment plan as noted above. Further recommendations will be based upon the results of the infectious disease consultation. 1 month reassess her    No concerns are raised for misuse, abuse, or diversion.     Counseling occupied > 50% of visit:  Total time: 45 minutes

## 2017-05-02 NOTE — MR AVS SNAPSHOT
Visit Information Date & Time Provider Department Dept. Phone Encounter #  
 5/2/2017 10:20 AM Sabine Andino MD [facility] [phone] Follow-up Instructions Return in about 1 month (around 6/2/2017). Your Appointments 5/15/2017 10:10 AM  
Follow Up with Lisa Whitlock NP  
[facility] ([address] [place] [address]) Appt Note: f/u - sx 2015, pt complains of headache for 1 yr  
 [address] [facility] [address]  
[phone]  
  
   
 [address] [facility],[address] [address] [facility]  
  
    
 6/2/2017 10:40 AM  
Follow Up with Sabine Andino MD  
[facility] [address] [place] [address]) Appt Note: FOLLOW UP  
 [address] [facility] [address]  
[phone] [address] [facility]  
  
    
 7/3/2017 10:40 AM  
Follow Up with Sabine Andino MD  
[facility] [address] [place] [address]) Appt Note: FOLLOW UP  
 [address]  
[phone]  
  
    
 8/3/2017 11:20 AM  
Follow Up with Sabine Andino MD  
[facility] [address] [place] [address]) Appt Note: FOLLOW UP  
 [address] [facility] [address]  
[phone] 9/1/2017 10:40 AM  
Follow Up with Sabine Andino MD  
[facility] [address] [place] [address]) Appt Note: FOLLOW UP  
 [address]  
[phone]  
  
    
 9/29/2017  1:00 PM  
Follow Up with Sabine Andino MD  
[facility] [address] [place] [address]) Appt Note: pt f/u with [address]  
[phone]  
  
    
 10/27/2017 10:40 AM  
Follow Up with Sabine Andino MD  
[facility] [address] [place] [address]) Appt Note: F[facility]U [facility] [address]  
[phone]  
  
    
 11/24/2017 10:40 AM  
Follow Up with César Ann MD  
[facility] [address] [place] [address]) Appt Note: pt f/u  
 [address] [facility]  
[phone]  
  
    
 12/22/2017 10:40 AM  
Follow Up with César Ann MD  
[facility] [address] [place] [address]) Appt Note: pt f/u  
 [address] [facility] [address]  
[phone] Upcoming Health Maintenance Date Due DTaP/Tdap/Td series (1 - Tdap) 10/17/1972 ZOSTER VACCINE AGE 60> 10/17/2011 GLAUCOMA SCREENING Q2Y 10/17/2016 Pneumococcal 65+ High/Highest Risk (1 of 2 - PCV13) 10/17/2016 MEDICARE YEARLY EXAM 10/17/2016 BREAST CANCER SCRN MAMMOGRAM 6/18/2017 INFLUENZA AGE 9 TO ADULT 8/1/2017 COLONOSCOPY 12/8/2026 Allergies as of 5/2/2017  Review Complete On: 5/2/2017 By: Peyman Arboleda RN Severity Noted Reaction Type Reactions Avelox [Moxifloxacin]  06/26/2013    Other (comments) Other reaction(s): other/intolerance Seizures Nerve pain Azithromycin  09/24/2013    Rash, Other (comments) Other reaction(s): unknown Pt states is not allergic to this med Acute toxic reaction Bactrim [Sulfamethoprim Ds]  12/20/2013    Other (comments) Severe abdominal pain Bupropion  06/02/2015    Other (comments) Muscle pains All antidepressants. Ciprofloxacin  01/21/2014    Other (comments) Other reaction(s): other/intolerance Lowers bp AMS Duloxetine  06/02/2015    Other (comments) \"shocks in brain\" Focalin [Dexmethylphenidate]  05/14/2014    Other (comments) Pt denies any allergic Keflex [Cephalexin]  09/12/2014    Other (comments) Abdominal pain Macrobid [Nitrofurantoin Monohyd/m-cryst]  03/21/2014    Swelling Other reaction(s): other/intolerance Swelling of colon Other reaction(s): other/intolerance Swelling of colon Other Medication    Other (comments) Dissolving sutures Phenergan [Promethazine]  07/13/2012    Other (comments) Other reaction(s): neurological reaction \" i see things\" hullucinations Sucralfate  06/02/2015    Myalgia Patient able to tolerate now Sulfa (Sulfonamide Antibiotics)  06/02/2015    Hives Sulfate Salt    Unknown (comments) Venlafaxine  06/02/2015    Itching Yeast, Dried  11/16/2015    Other (comments), Hives Patient reported extreme lethargy, bloating/abdominal pain, and digestive problems when consumes yeast or foods containing yeast  
  
Current Immunizations  Never Reviewed No immunizations on file. Not reviewed this visit You Were Diagnosed With   
  
 Codes Comments Vitamin B 12 deficiency    -  Primary ICD-10-CM: E53.8 ICD-9-CM: 266.2 Lumbar neuritis     ICD-10-CM: M54.16 
ICD-9-CM: 724.4 Thyroid disease     ICD-10-CM: E07.9 ICD-9-CM: 246. 9 Vitals BP Pulse Temp Weight(growth percentile) BMI OB Status 115/76 84 97.1 °F (36.2 °C) 145 lb (65.8 kg) 28.32 kg/m2 Hysterectomy Smoking Status Former Smoker BMI and BSA Data Body Mass Index Body Surface Area  
 28.32 kg/m 2 1.67 m 2 Preferred Pharmacy Pharmacy Name Phone [address] [facility] [phone] Your Updated Medication List  
  
   
This list is accurate as of: 5/2/17 11:53 AM.  Always use your most recent med list.  
  
  
  
  
 acetaminophen 500 mg tablet Commonly known as:  TYLENOL  
1,000 mg. CALCIUM PO Take 1,000 mg by mouth. * cyanocobalamin 1,000 mcg/mL injection Commonly known as:  VITAMIN B-12  
1 mL by IntraMUSCular route once for 1 dose. * cyanocobalamin 1,000 mcg/mL injection Commonly known as:  VITAMIN B12  
1,000 mcg by IntraMUSCular route every thirty (30) days. dextroamphetamine-amphetamine 30 mg tablet Commonly known as:  ADDERALL Take 1 Tab by mouth three (3) times dailyIndications: ATTENTION-DEFICIT HYPERACTIVITY DISORDER, inattention. Max Daily Amount: 3 Tabs  
  
 ergocalciferol 50,000 unit capsule Commonly known as:  ERGOCALCIFEROL  
TAKE 1 CAPSULE BY MOUTH EVERY 7 DAYS   90 day supply  Indications: VITAMIN D DEFICIENCY  
  
 estradiol 0.1 mg/24 hr  
Commonly known as:  Jlea  Apply one patch transdermally two times a week on Wednesday and Saturday FLECTOR 1.3 % Pt12 Generic drug:  diclofenac APPLY 1 PATCH TRANSDERMALLY TWICE A DAY  
  
 levocetirizine 5 mg tablet Commonly known as:  Loan Carvajal Take 1 Tab by mouth daily as needed. levothyroxine 112 mcg tablet Commonly known as:  SYNTHROID Take 1 Tab by mouth Daily (before breakfast) for 90 days. Indications: hypothyroidism  
  
 lisinopril 20 mg tablet Commonly known as:  PRINIVIL ZESTRIL  
TAKE 1 TABLET BY MOUTH TWO TIMES A DAY 90 day supply  Indications: hypertension LORazepam 2 mg tablet Commonly known as:  ATIVAN Take 2 mg by mouth.  
  
 methylphenidate 20 mg tablet Commonly known as:  RITALIN Take 1 Tab (20 mg total) by mouth three (3) times daily. Max Daily Amount: 60 mg Needle (Disp) 27 G 27 gauge x 1 1/4\" Ndle Use with 1ml syringe to inject b-12 OMEGA-3S-DHA-EPA-FISH OIL PO Take  by Mouth. * OTHER  
IVG infusions every 4 weeks-Dr. Irwin Hence * OTHER  
1 liter of 1/2 normal saline with 20 april of KCL. 1 liter over 4 hours * oxyCODONE ER 10 mg ER tablet Commonly known as:  OxyCONTIN  
1 po bid --- chronic pain  Indications: Chronic Pain, Severe Pain * oxyCODONE IR 5 mg immediate release tablet Commonly known as:  Garnetta Bonier Take 1 Tab by mouth four (4) times daily as needed for Pain for up to 30 days. Max Daily Amount: 20 mg. Indications: Pain PREGNENOLONE Take 400 mg by mouth daily. Indications: for stress hormone PRIVIGEN 10 % infusion Generic drug:  immune globulin 10% (human) 30 g by IntraVENous route every thirty (30) days. pseudoephedrine 30 mg tablet Commonly known as:  SUDAFED Take 1 Tab by mouth two (2) times a day. RESTASIS 0.05 % ophthalmic emulsion Generic drug:  cycloSPORINE Administer 1 Drop to both eyes two (2) times a day. SUCRALFATE PO Take  by mouth. Syringe (Disposable) 1 mL Syrg Use to inject b-12 sq  
  
 therapeutic multivitamin tablet Commonly known as:  [facility] Take 1 Tab by Mouth Once a Day. 1500 MG DAILY WELLBUTRIN 75 mg tablet Generic drug:  buPROPion Take  by mouth two (2) times a day. ZOLMitriptan 5 mg tablet Commonly known as:  ZOMIG  
TAKE 1 TABLET BY MOUTH AS NEEDED FOR MIGRAINE FOR UP TO 90 DAYS  Indications: MIGRAINE  
  
 * Notice: This list has 6 medication(s) that are the same as other medications prescribed for you. Read the directions carefully, and ask your doctor or other care provider to review them with you. Prescriptions Printed Refills OTHER 0 Si liter of 1/2 normal saline with 20 april of KCL. 1 liter over 4 hours Class: Print ZOLMitriptan (ZOMIG) 5 mg tablet 3 Sig: TAKE 1 TABLET BY MOUTH AS NEEDED FOR MIGRAINE FOR UP TO 90 DAYS  Indications: MIGRAINE Class: Print  
 oxyCODONE IR (ROXICODONE) 5 mg immediate release tablet 0 Sig: Take 1 Tab by mouth four (4) times daily as needed for Pain for up to 30 days. Max Daily Amount: 20 mg. Indications: Pain Class: Print Route: Oral  
 levothyroxine (SYNTHROID) 112 mcg tablet 3 Sig: Take 1 Tab by mouth Daily (before breakfast) for 90 days. Indications: hypothyroidism Class: Print Route: Oral  
 dextroamphetamine-amphetamine (ADDERALL) 30 mg tablet 0 Sig: Take 1 Tab by mouth three (3) times dailyIndications: ATTENTION-DEFICIT HYPERACTIVITY DISORDER, inattention. Max Daily Amount: 3 Tabs Class: Print  Route: Oral  
  
 We Performed the Following THER/PROPH/DIAG INJECTION, SUBCUT/IM [id number] CPT(R)] VITAMIN B12 INJECTION [ [facility]] Follow-up Instructions Return in about 1 month (around 6/2/2017). Patient Instructions Current health maintenance issues were reviewed and the patient was advised to followup with his/her PCP for completion of these items. Please provide this summary of care documentation to your next provider. Your primary care clinician is listed as Elise Fox. If you have any questions after today's visit, please call [phone].

## 2017-05-02 NOTE — TELEPHONE ENCOUNTER
Received call from pharmacy requesting refills on xyzal and Estradial. meds sent to wrong pharmacy. Will send new scripts over as requested.

## 2017-05-02 NOTE — PROGRESS NOTES
Nursing Notes    Patient presents to the office today in follow-up. Patient rates her pain at 1/10 on the numerical pain scale. Reviewed medications with counts as follows:    Rx Date filled Qty Dispensed Pill Count Last Dose Short   rafiq 5 4/7/17 120 20 5/2/17 no   Ritalin 20 4/4/17 90 9 5/2/17 no     Comments: has an appt with infectious disease specialist 5/3/17. Pt and  being advised again to continue to look for PCP, advises her home health agency is helping her locate one. POC UDS was not performed in office today    Any new labs or imaging since last appointment? YES, labs from ED visit    Have you been to an emergency room (ER) or urgent care clinic since your last visit? YES            Have you been hospitalized since your last visit? NO     If yes, where, when, and reason for visit? Have you seen or consulted any other health care providers outside of the [address] [place] [address]  since your last visit? YES     If yes, where, when, and reason for visit? [facility] with admission after passing out    Ms. Ray has a reminder for a \"due or due soon\" health maintenance. I have asked that she contact her primary care provider for follow-up on this health maintenance.

## 2017-05-09 ENCOUNTER — TELEPHONE (OUTPATIENT)
Dept: PAIN MANAGEMENT | Age: 67
End: 2017-05-09

## 2017-05-09 NOTE — TELEPHONE ENCOUNTER
Pt relayed fluid request through [facility]. She had last infusion on 5/2/17. She was relayed her request would be sent to [facility] for consult.

## 2017-06-02 ENCOUNTER — OFFICE VISIT (OUTPATIENT)
Dept: PAIN MANAGEMENT | Age: 67
End: 2017-06-02

## 2017-06-02 ENCOUNTER — DOCUMENTATION ONLY (OUTPATIENT)
Dept: PAIN MANAGEMENT | Age: 67
End: 2017-06-02

## 2017-06-02 VITALS
DIASTOLIC BLOOD PRESSURE: 80 MMHG | BODY MASS INDEX: 28.47 KG/M2 | SYSTOLIC BLOOD PRESSURE: 121 MMHG | HEART RATE: 113 BPM | WEIGHT: 145 LBS | HEIGHT: 60 IN | TEMPERATURE: 97.6 F

## 2017-06-02 DIAGNOSIS — M79.7 FIBROMYALGIA: Primary | ICD-10-CM

## 2017-06-02 DIAGNOSIS — E53.8 VITAMIN B 12 DEFICIENCY: ICD-10-CM

## 2017-06-02 DIAGNOSIS — M79.2 NEURITIS: ICD-10-CM

## 2017-06-02 DIAGNOSIS — R53.82 CHRONIC FATIGUE: ICD-10-CM

## 2017-06-02 DIAGNOSIS — G43.719 INTRACTABLE CHRONIC MIGRAINE WITHOUT AURA AND WITHOUT STATUS MIGRAINOSUS: ICD-10-CM

## 2017-06-02 DIAGNOSIS — Z79.899 ENCOUNTER FOR LONG-TERM (CURRENT) USE OF HIGH-RISK MEDICATION: ICD-10-CM

## 2017-06-02 DIAGNOSIS — F51.04 CHRONIC INSOMNIA: ICD-10-CM

## 2017-06-02 DIAGNOSIS — M54.16 LUMBAR NEURITIS: ICD-10-CM

## 2017-06-02 DIAGNOSIS — M47.812 CERVICAL SPONDYLOSIS WITHOUT MYELOPATHY: ICD-10-CM

## 2017-06-02 DIAGNOSIS — R50.9 FUO (FEVER OF UNKNOWN ORIGIN): ICD-10-CM

## 2017-06-02 DIAGNOSIS — E07.9 THYROID DISEASE: ICD-10-CM

## 2017-06-02 RX ORDER — DEXTROAMPHETAMINE SACCHARATE, AMPHETAMINE ASPARTATE, DEXTROAMPHETAMINE SULFATE AND AMPHETAMINE SULFATE 7.5; 7.5; 7.5; 7.5 MG/1; MG/1; MG/1; MG/1
30 TABLET ORAL 3 TIMES DAILY
Qty: 90 TAB | Refills: 0 | Status: SHIPPED | OUTPATIENT
Start: 2017-06-02 | End: 2017-07-03 | Stop reason: SDUPTHER

## 2017-06-02 RX ORDER — CYANOCOBALAMIN 1000 UG/ML
1000 INJECTION, SOLUTION INTRAMUSCULAR; SUBCUTANEOUS ONCE
Qty: 1 ML | Refills: 0
Start: 2017-06-02 | End: 2017-06-02

## 2017-06-02 NOTE — PROGRESS NOTES
Nursing Notes    Patient presents to the office today in follow-up. Patient rates her pain at 5/10 on the numerical pain scale. Reviewed medications with counts as follows:    Rx Date filled Qty Dispensed Pill Count Last Dose Short   Amphetamine salts 30 mg 05/04/17 90 7 This a.m no   Oxycodone 5 mg  05/05/17 120 17 today no                          POC UDS was not performed in office today. Any new labs or imaging since last appointment? NO    Have you been to an emergency room (ER) or urgent care clinic since your last visit? YES. Pt went to the ER for a UTI on 05/11/17            Have you been hospitalized since your last visit? NO     If yes, where, when, and reason for visit? Have you seen or consulted any other health care providers outside of the [facility]  since your last visit? YES     If yes, where, when, and reason for visit? Pt has been to the infusion center at [facility] for a 5 day IV antibiotic treatment. HM deferred to pcp.

## 2017-06-02 NOTE — PROGRESS NOTES
HISTORY OF PRESENT ILLNESS  Michelle Ray is a 73 y.o. female. HPI she returns for follow-up of chronic, severe pain which is widespread and multifactorial  Since last seen, she noted that she has not been contacted by the hematologist and a referral will be sent. This is part of her workup for FUO and the bone marrow biopsy has been recommended. She continues to require multiple infusions of IV half-normal saline secondary to recurrent dehydration. She noted that from May 13-15 she was treated with IV antibiotics for a presumed urinary tract infection. Fever went down temporarily but then recurred on the 27th. Since June 1 she has noted a productive cough of green mucus associated with sweating chills and fever and she will follow this up at urgent care    Pain level today 5 out of 10, outcome 13/28,(The lower the upper number, the better the outcome)  Physical activity and mobility as well as sleep continue to be poor, mood is fair. A current review of the  does not identify any inconsistency. Review of Systems   Constitutional: Positive for malaise/fatigue. Gastrointestinal: Positive for constipation. Neurological: Positive for sensory change (intermittent numbness right hand after use) and weakness (generalized). Psychiatric/Behavioral: The patient has insomnia. All other systems reviewed and are negative. Physical Exam   Constitutional: She is oriented to person, place, and time. No distress. HENT:   Head: Normocephalic and atraumatic. Right Ear: External ear normal.   Left Ear: External ear normal.   Nose: Nose normal.   Mouth/Throat: Oropharynx is clear and moist. No oropharyngeal exudate. Eyes: Conjunctivae and EOM are normal. Pupils are equal, round, and reactive to light. Right eye exhibits no discharge. Left eye exhibits no discharge. No scleral icterus. Neck: Normal range of motion. Neck supple. No thyromegaly present.    Cardiovascular: Normal rate, regular rhythm and normal heart sounds. Pulmonary/Chest: Effort normal and breath sounds normal. No respiratory distress. She has no wheezes. She has no rales. She exhibits no tenderness. Abdominal: Soft. She exhibits no distension. There is no tenderness. There is no rebound and no guarding. Musculoskeletal: Normal range of motion. Neurological: She is alert and oriented to person, place, and time. She has normal reflexes. No cranial nerve deficit. She exhibits normal muscle tone. Coordination normal.   Skin: Skin is warm and dry. No rash noted. Psychiatric: She has a normal mood and affect. Her behavior is normal. Judgment and thought content normal.   Nursing note and vitals reviewed. ASSESSMENT and PLAN  Encounter Diagnoses   Name Primary?  Fibromyalgia Yes    FUO (fever of unknown origin)     Neuritis     Thyroid disease     Lumbar neuritis     Vitamin B 12 deficiency     Intractable chronic migraine without aura and without status migrainosus     Chronic fatigue     Encounter for long-term (current) use of high-risk medication     Chronic insomnia     Cervical spondylosis without myelopathy      A trial of oxymorphone ER, 10 mg, twice daily will be initiated to help her chronic pain. 1 month reassess her  At the conclusion of the visit, 1000 mcg of vitamin B12 were administered intramuscularly without complication. No concerns are raised for misuse, abuse, or diversion. 1. Pain medications are prescribed with the objective of pain relief and improved physical and psychosocial function in this patient. 2. Counseled patient on proper use of prescribed medications and reviewed opioid contract. 3. Counseled patient about chronic medical conditions and their relationship to anxiety and depression and recommended mental health support as needed.   4. Reviewed with patient self-help tools, home exercise, and lifestyle changes to assist the patient in self-management of symptoms. 5. Advised patient to have a primary care provider to continue care for health maintenance and general medical conditions and support for referral to specialty care as needed. 6. Reviewed with patient the treatment plan, goals of treatment plan, and limitations of treatment plan, to include the potential for side effects from medications and procedures. If side effects occur, it is the responsibility of the patient to inform the clinic so that a change in the treatment plan can be made in a safe manner. The patient is advised that stopping prescribed medication may cause an increase in symptoms and possible medication withdrawal symptoms. The patient is informed an emergency room evaluation may be necessary if this occurs. DISPOSITION: The patients condition and plan were discussed at length and all questions were answered. The patient agrees with the plan.     Counseling occupied > 50% of visit:  Total time: 40 minutes

## 2017-06-06 RX ORDER — OXYCODONE HYDROCHLORIDE 5 MG/1
5 TABLET ORAL
Qty: 120 TAB | Refills: 0 | Status: SHIPPED | OUTPATIENT
Start: 2017-06-06 | End: 2017-07-06

## 2017-06-07 ENCOUNTER — TELEPHONE (OUTPATIENT)
Dept: PAIN MANAGEMENT | Age: 67
End: 2017-06-07

## 2017-06-07 NOTE — TELEPHONE ENCOUNTER
Called and gave information concerning consult need to [facility]. They took all info and NP coordinator will call back with appt. Direct triage line given for easier access. Have faxed notes to them as requested.

## 2017-06-08 ENCOUNTER — TELEPHONE (OUTPATIENT)
Dept: PAIN MANAGEMENT | Age: 67
End: 2017-06-08

## 2017-06-08 DIAGNOSIS — R50.9 FEVER OF UNKNOWN ORIGIN: Primary | ICD-10-CM

## 2017-06-08 NOTE — TELEPHONE ENCOUNTER
Have received call from [facility] and they do not see pt's for unexplained fevers. Have called pt since they consulted infectious disease who was going to refer her to hematology as well for the issue as well as possible bone marrow biopsy. Have asked  to contact them and see what the status is of this referral prior to us ordering the bone marrow biopsy. He will contact me with info and then action plan can be made if needed.

## 2017-06-09 NOTE — TELEPHONE ENCOUNTER
Have spoken with  and her infectious dx doc has not done any referrals. Will order on our end and [place] relayed as well.  Order in system

## 2017-06-15 ENCOUNTER — OFFICE VISIT (OUTPATIENT)
Dept: PAIN MANAGEMENT | Age: 67
End: 2017-06-15

## 2017-06-15 ENCOUNTER — DOCUMENTATION ONLY (OUTPATIENT)
Dept: PAIN MANAGEMENT | Age: 67
End: 2017-06-15

## 2017-06-15 VITALS
HEIGHT: 60 IN | WEIGHT: 160 LBS | DIASTOLIC BLOOD PRESSURE: 86 MMHG | SYSTOLIC BLOOD PRESSURE: 124 MMHG | BODY MASS INDEX: 31.41 KG/M2 | HEART RATE: 99 BPM

## 2017-06-15 DIAGNOSIS — M75.51 BURSITIS OF BOTH SHOULDERS: ICD-10-CM

## 2017-06-15 DIAGNOSIS — M75.100 ROTATOR CUFF SYNDROME, UNSPECIFIED LATERALITY: ICD-10-CM

## 2017-06-15 DIAGNOSIS — R50.9 FUO (FEVER OF UNKNOWN ORIGIN): ICD-10-CM

## 2017-06-15 DIAGNOSIS — M75.52 BURSITIS OF BOTH SHOULDERS: ICD-10-CM

## 2017-06-15 RX ORDER — BUPIVACAINE HYDROCHLORIDE 2.5 MG/ML
10 INJECTION, SOLUTION EPIDURAL; INFILTRATION; INTRACAUDAL ONCE
Qty: 10 ML | Refills: 0
Start: 2017-06-15 | End: 2017-06-15

## 2017-06-15 RX ORDER — TRIAMCINOLONE ACETONIDE 40 MG/ML
60 INJECTION, SUSPENSION INTRA-ARTICULAR; INTRAMUSCULAR ONCE
Qty: 1 ML | Refills: 0
Start: 2017-06-15 | End: 2017-06-15

## 2017-06-15 NOTE — PROGRESS NOTES
See scanned report    [id number] [facility]  OFFICE PROCEDURE PROGRESS NOTE        Chart reviewed for the following:   IRigo LPN, have reviewed the History, Physical and updated the Allergic reactions for Arthurine Glimpse A Day     TIME OUT performed immediately prior to start of procedure:   Adrian Berg M.D. have performed the following reviews on Arthurine Glimpse A Day prior to the start of the procedure:            * Patient was identified by name and date of birth   * Agreement on procedure being performed was verified  * Risks and Benefits explained to the patient  * Procedure site verified and marked as necessary  * Patient was positioned for comfort  * Consent was signed and verified     Time: 7:58 AM          Date of procedure: 6/15/2017    Procedure performed by:  Perry Newsome MD    Assisted by:patient    How tolerated by patient: tolerated the procedure well with no complications    Comments: none     Patient Label  Signature:_____________________________    Indications:   Symptom relief from osteoarthritis    Procedure:  After consent was obtained, using sterile technique the right and left shoulder was prepped using Chlorprep. Local anesthetic used: ethyl chloride topical.     Kenalog 60 mg was mixed with 0.5% marcaine 10 ml  and injected into the joint and the needle withdrawn. The procedure was well tolerated. The patient is asked to continue to rest the joint for a few more days before resuming regular activities. It may be more painful for the first 1-2 days. Watch for fever, or increased swelling or persistent pain in the joint. Call or return to clinic prn if such symptoms occur or there is failure to improve as anticipated.     -----------------------------------------------------------------------------------    PAIN LEVEL AT CONCLUSION OF PROCEDURE:  4/10

## 2017-06-15 NOTE — PROGRESS NOTES
Nursing Notes    Patient presents to the office today in follow-up. Patient rates her pain at 4/10 on the numerical pain scale. POC UDS was not performed in office today    Any new labs or imaging since last appointment? NO    Have you been to an emergency room (ER) or urgent care clinic since your last visit? NO            Have you been hospitalized since your last visit? NO  If yes, where, when, and reason for visit? Have you seen or consulted any other health care providers outside of the [address] [place] [address]  since your last visit? NO     If yes, where, when, and reason for visit? HM deferred to pcp.

## 2017-06-27 ENCOUNTER — HOSPITAL ENCOUNTER (OUTPATIENT)
Dept: INTERVENTIONAL RADIOLOGY/VASCULAR | Age: 67
Discharge: HOME | End: 2017-06-27
Attending: PSYCHIATRY & NEUROLOGY | Admitting: RADIOLOGY
Payer: MEDICARE

## 2017-06-27 VITALS
RESPIRATION RATE: 20 BRPM | SYSTOLIC BLOOD PRESSURE: 142 MMHG | HEART RATE: 86 BPM | BODY MASS INDEX: 29.06 KG/M2 | WEIGHT: 148 LBS | HEIGHT: 60 IN | OXYGEN SATURATION: 98 % | DIASTOLIC BLOOD PRESSURE: 67 MMHG

## 2017-06-27 DIAGNOSIS — R50.9 FUO (FEVER OF UNKNOWN ORIGIN): Primary | ICD-10-CM

## 2017-06-27 DIAGNOSIS — R50.9 FEVER OF UNKNOWN ORIGIN: ICD-10-CM

## 2017-06-27 LAB
ANION GAP BLD CALC-SCNC: 6 MMOL/L (ref 3–18)
APTT PPP: 23.6 SEC (ref 23–36.4)
BASOPHILS # BLD AUTO: 0 K/UL (ref 0–0.06)
BASOPHILS # BLD: 0 % (ref 0–2)
BUN SERPL-MCNC: 31 MG/DL (ref 7–18)
BUN/CREAT SERPL: 33 (ref 12–20)
CALCIUM SERPL-MCNC: 8.2 MG/DL (ref 8.5–10.1)
CHLORIDE SERPL-SCNC: 104 MMOL/L (ref 100–108)
CO2 SERPL-SCNC: 28 MMOL/L (ref 21–32)
CREAT SERPL-MCNC: 0.95 MG/DL (ref 0.6–1.3)
DIFFERENTIAL METHOD BLD: ABNORMAL
EOSINOPHIL # BLD: 0.1 K/UL (ref 0–0.4)
EOSINOPHIL NFR BLD: 1 % (ref 0–5)
ERYTHROCYTE [DISTWIDTH] IN BLOOD BY AUTOMATED COUNT: 13.5 % (ref 11.6–14.5)
GLUCOSE SERPL-MCNC: 112 MG/DL (ref 74–99)
HCT VFR BLD AUTO: 42.6 % (ref 35–45)
HGB BLD-MCNC: 14 G/DL (ref 12–16)
INR PPP: 0.9 (ref 0.8–1.2)
LYMPHOCYTES # BLD AUTO: 19 % (ref 21–52)
LYMPHOCYTES # BLD: 2 K/UL (ref 0.9–3.6)
MCH RBC QN AUTO: 30.9 PG (ref 24–34)
MCHC RBC AUTO-ENTMCNC: 32.9 G/DL (ref 31–37)
MCV RBC AUTO: 94 FL (ref 74–97)
MONOCYTES # BLD: 0.5 K/UL (ref 0.05–1.2)
MONOCYTES NFR BLD AUTO: 5 % (ref 3–10)
NEUTS SEG # BLD: 8 K/UL (ref 1.8–8)
NEUTS SEG NFR BLD AUTO: 75 % (ref 40–73)
PLATELET # BLD AUTO: 294 K/UL (ref 135–420)
PMV BLD AUTO: 9.8 FL (ref 9.2–11.8)
POTASSIUM SERPL-SCNC: 3.5 MMOL/L (ref 3.5–5.5)
PROTHROMBIN TIME: 11.7 SEC (ref 11.5–15.2)
RBC # BLD AUTO: 4.53 M/UL (ref 4.2–5.3)
SODIUM SERPL-SCNC: 138 MMOL/L (ref 136–145)
WBC # BLD AUTO: 10.7 K/UL (ref 4.6–13.2)

## 2017-06-27 PROCEDURE — 88237 TISSUE CULTURE BONE MARROW: CPT | Performed by: RADIOLOGY

## 2017-06-27 PROCEDURE — 88185 FLOWCYTOMETRY/TC ADD-ON: CPT | Performed by: RADIOLOGY

## 2017-06-27 PROCEDURE — 88313 SPECIAL STAINS GROUP 2: CPT | Performed by: RADIOLOGY

## 2017-06-27 PROCEDURE — 88311 DECALCIFY TISSUE: CPT | Performed by: RADIOLOGY

## 2017-06-27 PROCEDURE — 85025 COMPLETE CBC W/AUTO DIFF WBC: CPT | Performed by: PSYCHIATRY & NEUROLOGY

## 2017-06-27 PROCEDURE — 87070 CULTURE OTHR SPECIMN AEROBIC: CPT | Performed by: PSYCHIATRY & NEUROLOGY

## 2017-06-27 PROCEDURE — 88264 CHROMOSOME ANALYSIS 20-25: CPT | Performed by: RADIOLOGY

## 2017-06-27 PROCEDURE — 77030022017 HC DRSG HEMO QCLOT ZMED -A

## 2017-06-27 PROCEDURE — 77030003666 HC NDL SPINAL BD -A

## 2017-06-27 PROCEDURE — 80048 BASIC METABOLIC PNL TOTAL CA: CPT | Performed by: PSYCHIATRY & NEUROLOGY

## 2017-06-27 PROCEDURE — 88305 TISSUE EXAM BY PATHOLOGIST: CPT | Performed by: RADIOLOGY

## 2017-06-27 PROCEDURE — 77030028872 HC BN BIOP NDL ON CNTRL TY TELE -C

## 2017-06-27 PROCEDURE — 85610 PROTHROMBIN TIME: CPT | Performed by: PSYCHIATRY & NEUROLOGY

## 2017-06-27 PROCEDURE — 88184 FLOWCYTOMETRY/ TC 1 MARKER: CPT | Performed by: RADIOLOGY

## 2017-06-27 PROCEDURE — 85730 THROMBOPLASTIN TIME PARTIAL: CPT | Performed by: PSYCHIATRY & NEUROLOGY

## 2017-06-27 PROCEDURE — 74011000250 HC RX REV CODE- 250: Performed by: RADIOLOGY

## 2017-06-27 PROCEDURE — 38221 DX BONE MARROW BIOPSIES: CPT

## 2017-06-27 PROCEDURE — 74011250636 HC RX REV CODE- 250/636: Performed by: RADIOLOGY

## 2017-06-27 RX ORDER — MIDAZOLAM HYDROCHLORIDE 1 MG/ML
1 INJECTION, SOLUTION INTRAMUSCULAR; INTRAVENOUS
Status: DISCONTINUED | OUTPATIENT
Start: 2017-06-27 | End: 2017-06-27 | Stop reason: ALTCHOICE

## 2017-06-27 RX ORDER — LIDOCAINE HYDROCHLORIDE 10 MG/ML
30 INJECTION, SOLUTION EPIDURAL; INFILTRATION; INTRACAUDAL; PERINEURAL ONCE
Status: COMPLETED | OUTPATIENT
Start: 2017-06-27 | End: 2017-06-27

## 2017-06-27 RX ORDER — SODIUM CHLORIDE 9 MG/ML
20 INJECTION, SOLUTION INTRAVENOUS CONTINUOUS
Status: DISCONTINUED | OUTPATIENT
Start: 2017-06-27 | End: 2017-06-27 | Stop reason: HOSPADM

## 2017-06-27 RX ORDER — FENTANYL CITRATE 50 UG/ML
12.5-5 INJECTION, SOLUTION INTRAMUSCULAR; INTRAVENOUS
Status: DISCONTINUED | OUTPATIENT
Start: 2017-06-27 | End: 2017-06-27 | Stop reason: ALTCHOICE

## 2017-06-27 RX ADMIN — MIDAZOLAM HYDROCHLORIDE 1 MG: 1 INJECTION, SOLUTION INTRAMUSCULAR; INTRAVENOUS at 11:00

## 2017-06-27 RX ADMIN — FENTANYL CITRATE 50 MCG: 50 INJECTION INTRAMUSCULAR; INTRAVENOUS at 11:05

## 2017-06-27 RX ADMIN — MIDAZOLAM HYDROCHLORIDE 1 MG: 1 INJECTION, SOLUTION INTRAMUSCULAR; INTRAVENOUS at 11:05

## 2017-06-27 RX ADMIN — LIDOCAINE HYDROCHLORIDE 30 ML: 10 INJECTION, SOLUTION EPIDURAL; INFILTRATION; INTRACAUDAL; PERINEURAL at 11:05

## 2017-06-27 RX ADMIN — FENTANYL CITRATE 50 MCG: 50 INJECTION INTRAMUSCULAR; INTRAVENOUS at 11:00

## 2017-06-27 NOTE — H&P
OUTPATIENT HISTORY AND PHYSICAL      Today 6/27/2017     Indication/Symptoms:   Kayla Ray is a 73 y.o. female here for BM Bx. Current Meds:    Prior to Admission medications    Medication Sig Start Date End Date Taking? Authorizing Provider   OTHER 1 liter of 1/2 normal saline with 20 april of KCL. 1 liter over 4 hours 6/15/17  Yes Michaela Krishnamurthy MD   dextroamphetamine-amphetamine (ADDERALL) 30 mg tablet Take 1 Tab by mouth three (3) times dailyIndications: ATTENTION-DEFICIT HYPERACTIVITY DISORDER, inattention. Max Daily Amount: 3 Tabs 6/2/17 7/2/17 Yes Michaela Krishnamurthy MD   ZOLMitriptan (ZOMIG) 5 mg tablet TAKE 1 TABLET BY MOUTH AS NEEDED FOR MIGRAINE FOR UP TO 90 DAYS  Indications: MIGRAINE 5/2/17  Yes Michaela Krishnamurthy MD   lisinopril (PRINIVIL, ZESTRIL) 20 mg tablet TAKE 1 TABLET BY MOUTH TWO TIMES A DAY 90 day supply  Indications: hypertension 4/25/17  Yes Michaela Krishnamurthy MD   oxyCODONE ER (OXYCONTIN) 10 mg ER tablet 1 po bid --- chronic pain  Indications: Chronic Pain, Severe Pain 2/7/17  Yes Michaela Krishnamurthy MD   SUCRALFATE PO Take  by mouth. Yes Historical Provider   cycloSPORINE (RESTASIS) 0.05 % ophthalmic emulsion Administer 1 Drop to both eyes two (2) times a day. Yes Historical Provider   acetaminophen (TYLENOL) 500 mg tablet 1,000 mg. Yes Historical Provider   ergocalciferol (ERGOCALCIFEROL) 50,000 unit capsule TAKE 1 CAPSULE BY MOUTH EVERY 7 DAYS   90 day supply  Indications: VITAMIN D DEFICIENCY 11/4/16  Yes Michaela Krishnamurthy MD   LORazepam (ATIVAN) 2 mg tablet Take 2 mg by mouth. 5/16/16  Yes Historical Provider   PREGNENOLONE Take 400 mg by mouth daily. Indications: for stress hormone   Yes Historical Provider   immune globulin 10%, human, (PRIVIGEN) 10 % infusion 30 g by IntraVENous route every thirty (30) days.    Yes Historical Provider   OTHER IVG infusions every 4 weeks-Dr. Harrison Zhang   Yes Historical Provider   cyanocobalamin (VITAMIN B12) 1,000 mcg/mL injection 1,000 mcg by IntraMUSCular route every thirty (30) days. Yes Historical Provider   oxyCODONE IR (ROXICODONE) 5 mg immediate release tablet Take 1 Tab by mouth four (4) times daily as needed for Pain for up to 30 days. Max Daily Amount: 20 mg. Indications: Pain 6/6/17 7/6/17  Surendra Pinto MD   levocetirizine (XYZAL) 5 mg tablet Take 1 Tab by mouth daily as needed. 5/2/17   Surendra Pinto MD   estradiol (VIVELLE) 0.1 mg/24 hr Apply one patch transdermally two times a week on Wednesday and Saturday 5/2/17   Surendra Pinto MD   levothyroxine (SYNTHROID) 112 mcg tablet Take 1 Tab by mouth Daily (before breakfast) for 90 days. Indications: hypothyroidism 5/2/17 7/31/17  Surendra Pinto MD   pseudoephedrine (SUDAFED) 30 mg tablet Take 1 Tab by mouth two (2) times a day. 12/20/16   Surendra Pinto MD   CALCIUM PO Take 1,000 mg by mouth. Historical Provider   OMEGA-3S-DHA-EPA-FISH OIL PO Take  by Mouth. Historical Provider   therapeutic multivitamin (THERAGRAN) tablet Take 1 Tab by Mouth Once a Day. 1500 MG DAILY    Historical Provider   buPROPion (WELLBUTRIN) 75 mg tablet Take  by mouth two (2) times a day. Historical Provider   Syringe, Disposable, 1 mL syrg Use to inject b-12 sq 10/2/15   Surendra Pinto MD   Needle, Disp, 27 G 27 x 1 1/4 \" ndle Use with 1ml syringe to inject b-12 10/2/15   Surendra Pinto MD       Allergies: Allergies   Allergen Reactions    Avelox [Moxifloxacin] Other (comments)     Other reaction(s): other/intolerance  Seizures  Nerve pain      Azithromycin Rash and Other (comments)     Other reaction(s): unknown  Pt states is not allergic to this med  Acute toxic reaction    Bactrim [Sulfamethoprim Ds] Other (comments)     Severe abdominal pain    Bupropion Other (comments)     Muscle pains  All antidepressants.      Ciprofloxacin Other (comments)     Other reaction(s): other/intolerance  Lowers bp  AMS    Duloxetine Other (comments)     \"shocks in brain\"    Focalin [Dexmethylphenidate] Other (comments)     Pt denies any allergic    Keflex [Cephalexin] Other (comments)     Abdominal pain      Macrobid [Nitrofurantoin Monohyd/M-Cryst] Swelling     Other reaction(s): other/intolerance  Swelling of colon  Other reaction(s): other/intolerance  Swelling of colon    Other Medication Other (comments)     Dissolving sutures    Phenergan [Promethazine] Other (comments)     Other reaction(s): neurological reaction  \" i see things\"  hullucinations    Sucralfate Myalgia     Patient able to tolerate now     Sulfa (Sulfonamide Antibiotics) Hives    Sulfate Salt Unknown (comments)    Venlafaxine Itching    Yeast, Dried Other (comments) and Hives     Patient reported extreme lethargy, bloating/abdominal pain, and digestive problems when consumes yeast or foods containing yeast       Comorbid Conditions:    Past Medical History:   Diagnosis Date    ADD (attention deficit disorder)     Arthritis     Chronic low back pain     Cold hands and feet     Depression     ADD    Esophageal reflux     Fall at home     Fatigue     Fibromyalgia     GERD (gastroesophageal reflux disease)     H/O dehydration     Headache     Hiatal hernia 2004    Hyperlipidemia     Hypertension     Hypoglycemia     IgG deficiency (HCC)     Lumbago     Migraine     Nausea & vomiting     Pain in joint, pelvic region and thigh     Painful sex     sometimes    Poor appetite 2001    S/P lumbar fusion 11/17/2015    1. Bilateral L5-S1 laminotomy, medial facetectomy, foraminotomy. 2. L5-S1 transforaminal lumbar interbody fusion with PEEK cage and demineralized bone matrix. 3. Posterolateral fusion L5-S1. 4. Segmental spinal instrumentation, L5-S1, DePuy Expedium type.   11/16/2015 by Dr. Zulema Maxwell     Seizures [facility]) 2013    ONE TIME EVENT    Sinus infection     SOB (shortness of breath)     Spinal stenosis, lumbar region, without neurogenic claudication 10/19/2015    Strep throat     Swallowing difficulty     Swelling legs and feet     Thoracic or lumbosacral neuritis or radiculitis, unspecified     Thyroid disease     Thyroid disease     Tortuous colon           Past Surgical History:   Procedure Laterality Date    HX BREAST AUGMENTATION      rejected and subsequently removed    HX CHOLECYSTECTOMY      HX HEENT  4/2014    FACIAL MUSCLE SX-ON TEMPLE-due to muscle spasm    HX HYSTERECTOMY      HX LUMBAR FUSION  11-16-15    L5/S1 Laminectomy Fusion/TLIF    HX ORTHOPAEDIC  11/16/15    back surgery    HX OTHER SURGICAL      tooth extraction on 6/16 and root canal on 6/20     HX PELVIC LAPAROSCOPY       Data:    Visit Vitals    /72 (BP 1 Location: Left arm, BP Patient Position: At rest)    Pulse 100    Resp 23    Ht 5' (1.524 m)    Wt 67.1 kg (148 lb)    SpO2 97%    Breastfeeding No    BMI 28.9 kg/m2   :  Recent Labs      06/27/17   0935   PLT  294     Recent Labs      06/27/17   0935   INR  0.9   APTT  23.6       The H & P and/or progress notes and any available imaging were reviewed. The risks, indications and possible alternatives to the procedure, including doing nothing, were discussed and informed consent was obtained. Physical Exam:      Mental status:   Alert and oriented. Examination specific to the procedure proposed to be performed and any co morbid conditions:   Mallampati classification 2 ,  ASA2   Heart:   RRR. Lungs:   CTAB. No wheezes, rales or rhonchi. The patient is an appropriate candidate to undergo the planned procedure and sedation.     Kamaljit Greene MD

## 2017-06-27 NOTE — PROGRESS NOTES
Cath holding summary    Patient escorted to cath holding from waiting area ambulatory, alert and oriented x 4, voicing no complaints. Changed into gown and placed on monitor. NPO since MN. Lab results, med rec and H&P reviewed on chart, patient came in the 22g patent IV that was inserted at in home care. Family to bedside. 1235 patient discharged home with family, dressing to left hip clean, dry and intact. Vital signs stable.

## 2017-06-27 NOTE — PROGRESS NOTES
TRANSFER - OUT REPORT:    Verbal report given to CESARIO URIBE RN(name) on Arch Miranda A Day  being transferred to [facility](unit) for routine post - op       Report consisted of patients Situation, Background, Assessment and   Recommendations(SBAR). Information from the following report(s) SBAR, Kardex, Procedure Summary and MAR was reviewed with the receiving nurse. Lines:   Peripheral IV 06/12/17 Right;Upper Cephalic (Active)        Opportunity for questions and clarification was provided.       Patient transported with:   [employer]

## 2017-06-27 NOTE — IP AVS SNAPSHOT
Andres Gonzales 
 
 
 [address] Patient: Bobbi Conway Day MRN: [id number] :1950 You are allergic to the following Allergen Reactions Avelox (Moxifloxacin) Other (comments) Other reaction(s): other/intolerance Seizures Nerve pain Azithromycin Rash Other (comments) Other reaction(s): unknown Pt states is not allergic to this med Acute toxic reaction Bactrim (Sulfamethoprim Ds) Other (comments) Severe abdominal pain Bupropion Other (comments) Muscle pains All antidepressants. Ciprofloxacin Other (comments) Other reaction(s): other/intolerance Lowers bp AMS Duloxetine Other (comments) \"shocks in brain\" Focalin (Dexmethylphenidate) Other (comments) Pt denies any allergic Keflex (Cephalexin) Other (comments) Abdominal pain Macrobid (Nitrofurantoin Monohyd/M-Cryst) Swelling Other reaction(s): other/intolerance Swelling of colon Other reaction(s): other/intolerance Swelling of colon Other Medication Other (comments) Dissolving sutures Phenergan (Promethazine) Other (comments) Other reaction(s): neurological reaction \" i see things\" hullucinations Sucralfate Myalgia Patient able to tolerate now Sulfa (Sulfonamide Antibiotics) Hives Sulfate Salt Unknown (comments) Venlafaxine Itching Yeast, Dried Other (comments) Hives Patient reported extreme lethargy, bloating/abdominal pain, and digestive problems when consumes yeast or foods containing yeast  
  
Recent Documentation Height Weight Breastfeeding? BMI OB Status Smoking Status 1.524 m 67.1 kg No 28.9 kg/m2 Hysterectomy Former Smoker Unresulted Labs Order Current Status CULTURE, TISSUE W GRAM STAIN In process Emergency Contacts Name Discharge Info Relation Home Work Mobile [employer] CAREGIVER [3] Spouse [3] [phone] Day,Shawn  Spouse [3] [phone] About your hospitalization You were admitted on:  June 27, 2017 You last received care in the:  [facility] You were discharged on:  June 27, 2017 Unit phone number:  [phone] Why you were hospitalized Your primary diagnosis was:  Not on File Providers Seen During Your Hospitalizations Provider Role Specialty Primary office phone Mily Barker MD Attending Provider Pain Management [phone] Your Primary Care Physician (PCP) Primary Care Physician Office Phone Office Fax OTHER, PHYS ** None ** ** None ** Follow-up Information Follow up With Details Comments Contact Info Dakota Mcwilliams MD   Patient can only remember the practice name and not the physician Your Appointments Monday July 03, 2017 10:40 AM EDT Follow Up with Mily Barker MD  
[facility]) [place] [address]  
[phone] Thursday August 03, 2017 11:20 AM EDT Follow Up with Mily Barker MD  
[facility]) [place] [address]  
[phone] Current Discharge Medication List  
  
CONTINUE these medications which have NOT CHANGED Dose & Instructions Dispensing Information Comments Morning Noon Evening Bedtime  
 acetaminophen 500 mg tablet Commonly known as:  TYLENOL Your last dose was: Your next dose is:    
   
   
 Dose:  1000 mg  
1,000 mg. Refills:  0  
     
   
   
   
  
 CALCIUM PO Your last dose was: Your next dose is:    
   
   
 Dose:  1000 mg Take 1,000 mg by mouth. Refills:  0  
     
   
   
   
  
 cyanocobalamin 1,000 mcg/mL injection Commonly known as:  VITAMIN B12  
   
 Your last dose was: Your next dose is:    
   
   
 Dose:  1000 mcg  
1,000 mcg by IntraMUSCular route every thirty (30) days. Refills:  0  
     
   
   
   
  
 dextroamphetamine-amphetamine 30 mg tablet Commonly known as:  ADDERALL Your last dose was: Your next dose is:    
   
   
 Dose:  30 mg Take 1 Tab by mouth three (3) times dailyIndications: ATTENTION-DEFICIT HYPERACTIVITY DISORDER, inattention. Max Daily Amount: 3 Tabs Quantity:  90 Tab Refills:  0  
     
   
   
   
  
 ergocalciferol 50,000 unit capsule Commonly known as:  ERGOCALCIFEROL Your last dose was: Your next dose is: TAKE 1 CAPSULE BY MOUTH EVERY 7 DAYS   90 day supply  Indications: VITAMIN D DEFICIENCY Quantity:  12 Cap Refills:  3  
     
   
   
   
  
 estradiol 0.1 mg/24 hr  
Commonly known as:  Darrol Mantle Your last dose was: Your next dose is:    
   
   
 Apply one patch transdermally two times a week on Wednesday and Saturday Quantity:  24 Patch Refills:  3  
     
   
   
   
  
 levocetirizine 5 mg tablet Commonly known as:  Juanita Yi Your last dose was: Your next dose is:    
   
   
 Dose:  5 mg Take 1 Tab by mouth daily as needed. Quantity:  30 Tab Refills:  5  
     
   
   
   
  
 levothyroxine 112 mcg tablet Commonly known as:  SYNTHROID Your last dose was: Your next dose is:    
   
   
 Dose:  112 mcg Take 1 Tab by mouth Daily (before breakfast) for 90 days. Indications: hypothyroidism Quantity:  90 Tab Refills:  3  
     
   
   
   
  
 lisinopril 20 mg tablet Commonly known as:  Pablo Michael Your last dose was: Your next dose is: TAKE 1 TABLET BY MOUTH TWO TIMES A DAY 90 day supply  Indications: hypertension Quantity:  180 Tab Refills:  3 LORazepam 2 mg tablet Commonly known as:  ATIVAN Your last dose was: Your next dose is:    
   
   
 Dose:  2 mg Take 2 mg by mouth. Refills:  0 Needle (Disp) 27 G 27 gauge x 1 1/4\" Ndle Your last dose was: Your next dose is:    
   
   
 Use with 1ml syringe to inject b-12 Quantity:  10 Each Refills:  5 OMEGA-3S-DHA-EPA-FISH OIL PO Your last dose was: Your next dose is: Take  by Mouth. Refills:  0  
     
   
   
   
  
 * OTHER Your last dose was: Your next dose is: IVG infusions every 4 weeks-Dr. Harrison Zhang Refills:  0  
     
   
   
   
  
 * OTHER Your last dose was: Your next dose is:    
   
   
 1 liter of 1/2 normal saline with 20 april of KCL. 1 liter over 4 hours Quantity:  1 Bag Refills:  0  
     
   
   
   
  
 * oxyCODONE ER 10 mg ER tablet Commonly known as:  OxyCONTIN Your last dose was: Your next dose is:    
   
   
 1 po bid --- chronic pain  Indications: Chronic Pain, Severe Pain Quantity:  60 Tab Refills:  0  
     
   
   
   
  
 * oxyCODONE IR 5 mg immediate release tablet Commonly known as:  Ingrid Gonzalez Your last dose was: Your next dose is:    
   
   
 Dose:  5 mg Take 1 Tab by mouth four (4) times daily as needed for Pain for up to 30 days. Max Daily Amount: 20 mg. Indications: Pain Quantity:  120 Tab Refills:  0 PREGNENOLONE Your last dose was: Your next dose is:    
   
   
 Dose:  400 mg Take 400 mg by mouth daily. Indications: for stress hormone Refills:  0 PRIVIGEN 10 % infusion Generic drug:  immune globulin 10% (human) Your last dose was: Your next dose is:    
   
   
 Dose:  30 g  
30 g by IntraVENous route every thirty (30) days. Refills:  0  
     
   
   
   
  
 pseudoephedrine 30 mg tablet Commonly known as:  SUDAFED Your last dose was: Your next dose is:    
   
   
 Dose:  30 mg Take 1 Tab by mouth two (2) times a day. Quantity:  60 Tab Refills:  5 RESTASIS 0.05 % ophthalmic emulsion Generic drug:  cycloSPORINE Your last dose was: Your next dose is:    
   
   
 Dose:  1 Drop Administer 1 Drop to both eyes two (2) times a day. Refills:  0 SUCRALFATE PO Your last dose was: Your next dose is: Take  by mouth. Refills:  0 Syringe (Disposable) 1 mL Syrg Your last dose was: Your next dose is:    
   
   
 Use to inject b-12 sq Quantity:  10 Syringe Refills:  5  
     
   
   
   
  
 therapeutic multivitamin tablet Commonly known as:  [facility] Your last dose was: Your next dose is: Take 1 Tab by Mouth Once a Day. 1500 MG DAILY Refills:  0 WELLBUTRIN 75 mg tablet Generic drug:  buPROPion Your last dose was: Your next dose is: Take  by mouth two (2) times a day. Refills:  0  
     
   
   
   
  
 ZOLMitriptan 5 mg tablet Commonly known as:  ZOMIG Your last dose was: Your next dose is: TAKE 1 TABLET BY MOUTH AS NEEDED FOR MIGRAINE FOR UP TO 90 DAYS  Indications: MIGRAINE Quantity:  54 Tab Refills:  3  
     
   
   
   
  
 * Notice: This list has 4 medication(s) that are the same as other medications prescribed for you. Read the directions carefully, and ask your doctor or other care provider to review them with you. Discharge Instructions DISCHARGE SUMMARY from Nurse The following personal items are in your possession at time of discharge: 
 
  
Visual Aid: None PATIENT INSTRUCTIONS: 
 
After general anesthesia or intravenous sedation, for 24 hours or while taking prescription Narcotics: · Limit your activities · Do not drive and operate hazardous machinery · Do not make important personal or business decisions · Do  not drink alcoholic beverages · If you have not urinated within 8 hours after discharge, please contact your surgeon on call. Report the following to your surgeon: 
· Excessive pain, swelling, redness or odor of or around the surgical area · Temperature over 100.5 · Nausea and vomiting lasting longer than 4 hours or if unable to take medications · Any signs of decreased circulation or nerve impairment to extremity: change in color, persistent  numbness, tingling, coldness or increase pain · Any questions What to do at Home: *  Please give a list of your current medications to your Primary Care Provider. *  Please update this list whenever your medications are discontinued, doses are 
    changed, or new medications (including over-the-counter products) are added. *  Please carry medication information at all times in case of emergency situations. These are general instructions for a healthy lifestyle: No smoking/ No tobacco products/ Avoid exposure to second hand smoke Surgeon General's Warning:  Quitting smoking now greatly reduces serious risk to your health. Obesity, smoking, and sedentary lifestyle greatly increases your risk for illness A healthy diet, regular physical exercise & weight monitoring are important for maintaining a healthy lifestyle You may be retaining fluid if you have a history of heart failure or if you experience any of the following symptoms:  Weight gain of 3 pounds or more overnight or 5 pounds in a week, increased swelling in our hands or feet or shortness of breath while lying flat in bed. Please call your doctor as soon as you notice any of these symptoms; do not wait until your next office visit. Recognize signs and symptoms of STROKE: 
 
F-face looks uneven A-arms unable to move or move unevenly S-speech slurred or non-existent T-time-call 911 as soon as signs and symptoms begin-DO NOT go Back to bed or wait to see if you get better-TIME IS BRAIN. Warning Signs of HEART ATTACK Call 911 if you have these symptoms: 
? Chest discomfort. Most heart attacks involve discomfort in the center of the chest that lasts more than a few minutes, or that goes away and comes back. It can feel like uncomfortable pressure, squeezing, fullness, or pain. ? Discomfort in other areas of the upper body. Symptoms can include pain or discomfort in one or both arms, the back, neck, jaw, or stomach. ? Shortness of breath with or without chest discomfort. ? Other signs may include breaking out in a cold sweat, nausea, or lightheadedness. Don't wait more than five minutes to call [address]! Fast action can save your life. Calling 911 is almost always the fastest way to get lifesaving treatment. Emergency Medical Services staff can begin treatment when they arrive  up to an hour sooner than if someone gets to the hospital by car. The discharge information has been reviewed with the patient and caregiver. The patient and caregiver verbalized understanding. Discharge medications reviewed with the patient and caregiver and appropriate educational materials and side effects teaching were provided. Bone Marrow Aspiration and Biopsy: What to Expect at [facility] Your Recovery The biopsy site may feel sore for several days. It can help to walk, take pain medicine, and put ice packs on the site. You will probably be able to return to work and your usual activities the day after the procedure. Your doctor or nurse will call you with the results of your test. 
This care sheet gives you a general idea about how long it will take for you to recover. But each person recovers at a different pace. Follow the steps below to get better as quickly as possible. How can you care for yourself at home? Activity · Rest when you feel tired. Getting enough sleep will help you recover. · You may drive when you are no longer taking pain pills and can quickly move your foot from the gas pedal to the brake. You must also be able to sit comfortably for a long period of time, even if you do not plan to go far. You might get caught in traffic. · Most people are able to return to work the day after the procedure. Medicines · Your doctor will tell you if and when you can restart your medicines. He or she will also give you instructions about taking any new medicines. · If you take blood thinners, such as warfarin (Coumadin), clopidogrel (Plavix), or aspirin, be sure to talk to your doctor. He or she will tell you if and when to start taking those medicines again. Make sure that you understand exactly what your doctor wants you to do. · Be safe with medicines. Take pain medicines exactly as directed. ¨ If the doctor gave you a prescription medicine for pain, take it as prescribed. ¨ If you are not taking a prescription pain medicine, take an over-the-counter medicine such as acetaminophen (Tylenol), ibuprofen (Advil, Motrin), or naproxen (Aleve). Read and follow all instructions on the label. ¨ Do not take two or more pain medicines at the same time unless the doctor told you to. Many pain medicines have acetaminophen, which is Tylenol. Too much acetaminophen (Tylenol) can be harmful. · If you think your pain medicine is making you sick to your stomach: 
¨ Take your medicine after meals (unless your doctor has told you not to). ¨ Ask your doctor for a different pain medicine. · If your doctor prescribed antibiotics, take them as directed. Do not stop taking them just because you feel better. Ice · Put ice or a cold pack on the biopsy site for 10 to 20 minutes at a time. Put a thin cloth between the ice and your skin. Follow-up care is a key part of your treatment and safety.  Be sure to make and go to all appointments, and call your doctor if you are having problems. It's also a good idea to know your test results and keep a list of the medicines you take. When should you call for help? Call 911 anytime you think you may need emergency care. For example, call if: 
· You passed out (lost consciousness). Call your doctor now or seek immediate medical care if: 
· You have signs of infection, such as: 
¨ Increased pain, swelling, warmth, or redness. ¨ Red streaks leading from the biopsy site. ¨ Pus draining from the biopsy site. ¨ Swollen lymph nodes in your neck, armpits, or groin. ¨ A fever. Watch closely for any changes in your health, and be sure to contact your doctor if: 
· You are not getting better as expected. Where can you learn more? Go to http://zoe-jocelyne.info/. Enter E148 in the search box to learn more about \"Bone Marrow Aspiration and Biopsy: What to Expect at Home. \" Current as of: October 14, 2016 Content Version: 11.3 © 2962-2694 [employer]. Care instructions adapted under license by [employer] (which disclaims liability or warranty for this information). If you have questions about a medical condition or this instruction, always ask your healthcare professional. [address] any warranty or liability for your use of this information. Patient armband removed and shredded Discharge Orders None Introducing [facility]! Dear Amina Senior: 
Thank you for requesting a [facility] account. Our records indicate that you already have an active [facility] account. You can access your account anytime at [email or web address] Did you know that you can access your hospital and ER discharge instructions at any time in [facility]? You can also review all of your test results from your hospital stay or ER visit. Additional Information If you have questions, please visit the Frequently Asked Questions section of the MyChart website at [email or web address] Remember, MyChart is NOT to be used for urgent needs. For medical emergencies, dial 911. Now available from your iPhone and Android! General Information Please provide this summary of care documentation to your next provider. Patient Signature:  ____________________________________________________________ Date:  ____________________________________________________________  
  
[facility] Provider Signature:  ____________________________________________________________ Date:  ____________________________________________________________

## 2017-06-27 NOTE — DISCHARGE INSTRUCTIONS
DISCHARGE SUMMARY from Nurse    The following personal items are in your possession at time of discharge:       Visual Aid: None                            PATIENT INSTRUCTIONS:    After general anesthesia or intravenous sedation, for 24 hours or while taking prescription Narcotics:  · Limit your activities  · Do not drive and operate hazardous machinery  · Do not make important personal or business decisions  · Do  not drink alcoholic beverages  · If you have not urinated within 8 hours after discharge, please contact your surgeon on call. Report the following to your surgeon:  · Excessive pain, swelling, redness or odor of or around the surgical area  · Temperature over 100.5  · Nausea and vomiting lasting longer than 4 hours or if unable to take medications  · Any signs of decreased circulation or nerve impairment to extremity: change in color, persistent  numbness, tingling, coldness or increase pain  · Any questions        What to do at Home:    *  Please give a list of your current medications to your Primary Care Provider. *  Please update this list whenever your medications are discontinued, doses are      changed, or new medications (including over-the-counter products) are added. *  Please carry medication information at all times in case of emergency situations. These are general instructions for a healthy lifestyle:    No smoking/ No tobacco products/ Avoid exposure to second hand smoke    Surgeon General's Warning:  Quitting smoking now greatly reduces serious risk to your health.     Obesity, smoking, and sedentary lifestyle greatly increases your risk for illness    A healthy diet, regular physical exercise & weight monitoring are important for maintaining a healthy lifestyle    You may be retaining fluid if you have a history of heart failure or if you experience any of the following symptoms:  Weight gain of 3 pounds or more overnight or 5 pounds in a week, increased swelling in our hands or feet or shortness of breath while lying flat in bed. Please call your doctor as soon as you notice any of these symptoms; do not wait until your next office visit. Recognize signs and symptoms of STROKE:    F-face looks uneven    A-arms unable to move or move unevenly    S-speech slurred or non-existent    T-time-call 911 as soon as signs and symptoms begin-DO NOT go       Back to bed or wait to see if you get better-TIME IS BRAIN. Warning Signs of HEART ATTACK     Call 911 if you have these symptoms:   Chest discomfort. Most heart attacks involve discomfort in the center of the chest that lasts more than a few minutes, or that goes away and comes back. It can feel like uncomfortable pressure, squeezing, fullness, or pain.  Discomfort in other areas of the upper body. Symptoms can include pain or discomfort in one or both arms, the back, neck, jaw, or stomach.  Shortness of breath with or without chest discomfort.  Other signs may include breaking out in a cold sweat, nausea, or lightheadedness. Don't wait more than five minutes to call 911 - MINUTES MATTER! Fast action can save your life. Calling 911 is almost always the fastest way to get lifesaving treatment. Emergency Medical Services staff can begin treatment when they arrive -- up to an hour sooner than if someone gets to the hospital by car. The discharge information has been reviewed with the patient and caregiver. The patient and caregiver verbalized understanding. Discharge medications reviewed with the patient and caregiver and appropriate educational materials and side effects teaching were provided. Bone Marrow Aspiration and Biopsy: What to Expect at Home  Your Recovery  The biopsy site may feel sore for several days. It can help to walk, take pain medicine, and put ice packs on the site. You will probably be able to return to work and your usual activities the day after the procedure.  Your doctor or nurse will call you with the results of your test.  This care sheet gives you a general idea about how long it will take for you to recover. But each person recovers at a different pace. Follow the steps below to get better as quickly as possible. How can you care for yourself at home? Activity  · Rest when you feel tired. Getting enough sleep will help you recover. · You may drive when you are no longer taking pain pills and can quickly move your foot from the gas pedal to the brake. You must also be able to sit comfortably for a long period of time, even if you do not plan to go far. You might get caught in traffic. · Most people are able to return to work the day after the procedure. Medicines  · Your doctor will tell you if and when you can restart your medicines. He or she will also give you instructions about taking any new medicines. · If you take blood thinners, such as warfarin (Coumadin), clopidogrel (Plavix), or aspirin, be sure to talk to your doctor. He or she will tell you if and when to start taking those medicines again. Make sure that you understand exactly what your doctor wants you to do. · Be safe with medicines. Take pain medicines exactly as directed. ¨ If the doctor gave you a prescription medicine for pain, take it as prescribed. ¨ If you are not taking a prescription pain medicine, take an over-the-counter medicine such as acetaminophen (Tylenol), ibuprofen (Advil, Motrin), or naproxen (Aleve). Read and follow all instructions on the label. ¨ Do not take two or more pain medicines at the same time unless the doctor told you to. Many pain medicines have acetaminophen, which is Tylenol. Too much acetaminophen (Tylenol) can be harmful. · If you think your pain medicine is making you sick to your stomach:  ¨ Take your medicine after meals (unless your doctor has told you not to). ¨ Ask your doctor for a different pain medicine.   · If your doctor prescribed antibiotics, take them as directed. Do not stop taking them just because you feel better. Ice  · Put ice or a cold pack on the biopsy site for 10 to 20 minutes at a time. Put a thin cloth between the ice and your skin. Follow-up care is a key part of your treatment and safety. Be sure to make and go to all appointments, and call your doctor if you are having problems. It's also a good idea to know your test results and keep a list of the medicines you take. When should you call for help? Call 911 anytime you think you may need emergency care. For example, call if:  · You passed out (lost consciousness). Call your doctor now or seek immediate medical care if:  · You have signs of infection, such as:  ¨ Increased pain, swelling, warmth, or redness. ¨ Red streaks leading from the biopsy site. ¨ Pus draining from the biopsy site. ¨ Swollen lymph nodes in your neck, armpits, or groin. ¨ A fever. Watch closely for any changes in your health, and be sure to contact your doctor if:  · You are not getting better as expected. Where can you learn more? Go to http://zoe-jocelyne.info/. Enter E148 in the search box to learn more about \"Bone Marrow Aspiration and Biopsy: What to Expect at Home. \"  Current as of: October 14, 2016  Content Version: 11.3  © 4682-8084 [employer] Â®, [employer]. Care instructions adapted under license by [employer] (which disclaims liability or warranty for this information). If you have questions about a medical condition or this instruction, always ask your healthcare professional. [place] [address] any warranty or liability for your use of this information.     Patient armband removed and shredded

## 2017-07-02 LAB
BACTERIA SPEC CULT: NORMAL
GRAM STN SPEC: NORMAL
GRAM STN SPEC: NORMAL
SERVICE CMNT-IMP: NORMAL

## 2017-07-03 ENCOUNTER — DOCUMENTATION ONLY (OUTPATIENT)
Dept: PAIN MANAGEMENT | Age: 67
End: 2017-07-03

## 2017-07-03 ENCOUNTER — OFFICE VISIT (OUTPATIENT)
Dept: PAIN MANAGEMENT | Age: 67
End: 2017-07-03

## 2017-07-03 VITALS
DIASTOLIC BLOOD PRESSURE: 86 MMHG | BODY MASS INDEX: 29.06 KG/M2 | WEIGHT: 148 LBS | HEIGHT: 60 IN | HEART RATE: 90 BPM | SYSTOLIC BLOOD PRESSURE: 128 MMHG

## 2017-07-03 DIAGNOSIS — E53.8 VITAMIN B 12 DEFICIENCY: ICD-10-CM

## 2017-07-03 DIAGNOSIS — M79.7 FIBROMYALGIA: Primary | ICD-10-CM

## 2017-07-03 DIAGNOSIS — E86.0 DEHYDRATION: ICD-10-CM

## 2017-07-03 DIAGNOSIS — M54.16 LUMBAR NEURITIS: ICD-10-CM

## 2017-07-03 DIAGNOSIS — R61 DIAPHORESIS: ICD-10-CM

## 2017-07-03 DIAGNOSIS — M54.17 LUMBOSACRAL RADICULOPATHY: ICD-10-CM

## 2017-07-03 DIAGNOSIS — G43.719 INTRACTABLE CHRONIC MIGRAINE WITHOUT AURA AND WITHOUT STATUS MIGRAINOSUS: ICD-10-CM

## 2017-07-03 DIAGNOSIS — F32.A DEPRESSION: ICD-10-CM

## 2017-07-03 DIAGNOSIS — F32.A DEPRESSION, UNSPECIFIED DEPRESSION TYPE: ICD-10-CM

## 2017-07-03 DIAGNOSIS — Z79.899 ENCOUNTER FOR LONG-TERM (CURRENT) USE OF OTHER MEDICATIONS: ICD-10-CM

## 2017-07-03 DIAGNOSIS — M54.50 LUMBAGO: ICD-10-CM

## 2017-07-03 DIAGNOSIS — D81.9 COMBINED VARIABLE IMMUNODEFICIENCY (HCC): ICD-10-CM

## 2017-07-03 DIAGNOSIS — Z79.899 ENCOUNTER FOR LONG-TERM (CURRENT) USE OF HIGH-RISK MEDICATION: ICD-10-CM

## 2017-07-03 DIAGNOSIS — R53.83 FATIGUE: ICD-10-CM

## 2017-07-03 DIAGNOSIS — E78.5 HYPERLIPIDEMIA: ICD-10-CM

## 2017-07-03 DIAGNOSIS — E07.9 THYROID DISEASE: ICD-10-CM

## 2017-07-03 DIAGNOSIS — M25.559 PAIN IN JOINT, PELVIC REGION AND THIGH: ICD-10-CM

## 2017-07-03 RX ORDER — DEXTROAMPHETAMINE SACCHARATE, AMPHETAMINE ASPARTATE, DEXTROAMPHETAMINE SULFATE AND AMPHETAMINE SULFATE 7.5; 7.5; 7.5; 7.5 MG/1; MG/1; MG/1; MG/1
30 TABLET ORAL 3 TIMES DAILY
Qty: 90 TAB | Refills: 0 | Status: SHIPPED | OUTPATIENT
Start: 2017-07-03 | End: 2017-08-03 | Stop reason: SDUPTHER

## 2017-07-03 RX ORDER — OXYCODONE HYDROCHLORIDE 10 MG/1
5-10 TABLET ORAL
Qty: 120 TAB | Refills: 0 | Status: SHIPPED | OUTPATIENT
Start: 2017-07-03 | End: 2017-08-03 | Stop reason: SDUPTHER

## 2017-07-03 RX ORDER — CYANOCOBALAMIN 1000 UG/ML
1000 INJECTION, SOLUTION INTRAMUSCULAR; SUBCUTANEOUS ONCE
Qty: 1 ML | Refills: 0
Start: 2017-07-03 | End: 2017-07-03

## 2017-07-03 NOTE — PROGRESS NOTES
Nursing Notes    Patient presents to the office today in follow-up. Patient rates her pain at 5/10 on the numerical pain scale. Reviewed medications with counts as follows:    Rx Date filled Qty Dispensed Pill Count Last Dose Short   Oxycodone 5 mg 06/07/17 120 2 today    adderall 30 mg  06/03/17 90 3 today                            POC UDS was not performed in office today. Any new labs or imaging since last appointment? YES. Pt had labs done with new thyroid doctor    Have you been to an emergency room (ER) or urgent care clinic since your last visit? YES. Pt went to the ER for bronchitis and once for constipation. Have you been hospitalized since your last visit? NO     If yes, where, when, and reason for visit? Pt had an out pt bone marrow biopsy done at [facility]    Have you seen or consulted any other health care providers outside of the [address] [place] [address]  since your last visit? YES     If yes, where, when, and reason for visit? Endocrinology    HM deferred to pcp.

## 2017-07-03 NOTE — PROGRESS NOTES
HISTORY OF PRESENT ILLNESS  Feliberto Ray is a 73 y.o. female. HPI  she returns for follow-up of chronic, severe pain which is widespread and multifactorial  Since last seen, she underwent a bone marrow aspiration and biopsy which has, thus far, proven negative in assessing her FUO. She continues to report fevers as high as 101. In addition, from June 24 to the current time, she has developed small red papules on the bridge of her tongue and lips as well as in the feet consistent with coxsackie. She has received IV doxycycline and recurrent fluids for dehydration and bronchitis. It is recommended that she have a Mediport inserted as she will continue to be receiving normal saline twice weekly along with Privigen for her combined variable immunodeficiency disease. She has also been seen by a new endocrinologist to has placed her on [place] Thyroid which seems to be working much better than her Synthroid. Finally she has been reevaluated by another oral surgeon who recommended that she receive her oral surgery in hospital and she has been referred back to [facility] by this oral surgeon for that purpose. Pain continues to be variably controlled with up to 80% relief. He is finding that the oxycodone 5 mg tablets are, at times, not effective and this will be increased to 10 mg up to 4 times daily as needed for her chronic pain. She understands that consideration may be given towards the use of long-acting medication based upon her response. A current review of the  does not identify any inconsistency. Review of Systems   Constitutional: Positive for malaise/fatigue. Gastrointestinal: Positive for constipation. Neurological: Positive for sensory change (intermittent numbness right hand after use) and weakness (generalized). Psychiatric/Behavioral: The patient has insomnia. All other systems reviewed and are negative.       Physical Exam   Constitutional: She is oriented to person, place, and time. She appears distressed. HENT:   Head: Normocephalic and atraumatic. Right Ear: External ear normal.   Left Ear: External ear normal.   Nose: Nose normal.   Mouth/Throat: Oropharynx is clear and moist. No oropharyngeal exudate. Eyes: Conjunctivae and EOM are normal. Pupils are equal, round, and reactive to light. Right eye exhibits no discharge. Left eye exhibits no discharge. No scleral icterus. Neck: Normal range of motion. Neck supple. No thyromegaly present. Cardiovascular: Normal rate, regular rhythm and normal heart sounds. Pulmonary/Chest: Effort normal and breath sounds normal. No respiratory distress. She has no wheezes. She has no rales. She exhibits no tenderness. Abdominal: Soft. She exhibits no distension. There is no tenderness. There is no rebound and no guarding. Musculoskeletal: Normal range of motion. Neurological: She is alert and oriented to person, place, and time. She has normal reflexes. No cranial nerve deficit. She exhibits normal muscle tone. Coordination normal.   Skin: Skin is warm and dry. No rash noted. Psychiatric: She has a normal mood and affect. Her behavior is normal. Judgment and thought content normal.   Nursing note and vitals reviewed. ASSESSMENT and PLAN  Encounter Diagnoses   Name Primary?  Fibromyalgia Yes    Lumbar neuritis     Thyroid disease     Intractable chronic migraine without aura and without status migrainosus     Depression, unspecified depression type     Encounter for long-term (current) use of high-risk medication     Combined variable immunodeficiency ([facility] [address]     Dehydration     Lumbosacral radiculopathy     Pain in joint, pelvic region and thigh     Lumbago     Fatigue     Hyperlipidemia     Depression     Encounter for long-term (current) use of other medications     Diaphoresis     Vitamin B 12 deficiency      Treatment plan as noted above.   At the conclusion of the visit, 1000 mcg of vitamin B12 were administered intramuscularly without incident. 1 month reassess her    No concerns are raised for misuse, abuse, or diversion. 1. Pain medications are prescribed with the objective of pain relief and improved physical and psychosocial function in this patient. 2. Counseled patient on proper use of prescribed medications and reviewed opioid contract. 3. Counseled patient about chronic medical conditions and their relationship to anxiety and depression and recommended mental health support as needed. 4. Reviewed with patient self-help tools, home exercise, and lifestyle changes to assist the patient in self-management of symptoms. 5. Advised patient to have a primary care provider to continue care for health maintenance and general medical conditions and support for referral to specialty care as needed. 6. Reviewed with patient the treatment plan, goals of treatment plan, and limitations of treatment plan, to include the potential for side effects from medications and procedures. If side effects occur, it is the responsibility of the patient to inform the clinic so that a change in the treatment plan can be made in a safe manner. The patient is advised that stopping prescribed medication may cause an increase in symptoms and possible medication withdrawal symptoms. The patient is informed an emergency room evaluation may be necessary if this occurs. DISPOSITION: The patients condition and plan were discussed at length and all questions were answered. The patient agrees with the plan.     Counseling occupied > 50% of visit:  Total time: 45 minutes

## 2017-07-06 ENCOUNTER — DOCUMENTATION ONLY (OUTPATIENT)
Dept: PAIN MANAGEMENT | Age: 67
End: 2017-07-06

## 2017-07-06 ENCOUNTER — TELEPHONE (OUTPATIENT)
Dept: PAIN MANAGEMENT | Age: 67
End: 2017-07-06

## 2017-07-06 NOTE — PROGRESS NOTES
Request for records from [facility] and fax request to [employer] to be process on 07/06/2017.

## 2017-07-06 NOTE — TELEPHONE ENCOUNTER
Pt called re pa for oxy-ir. Called [facility] ([phone]) - spoke with Julian Coyle - did coverage review through October - does not need a pa. Julian Coyle said she gets a paid claim everytime she runs it. She looked at the claim - as pt just filled and there is no reject code - she is not sure why pharmacy said it will need a pa. Called pt to let her know oxy-ir does not need a pa. Pt said pharmacy gave her  a paper with new requirements on it - told her that is the medicaid side of insurance not medicare or commercial. Pt understood.

## 2017-07-10 ENCOUNTER — DOCUMENTATION ONLY (OUTPATIENT)
Dept: PAIN MANAGEMENT | Age: 67
End: 2017-07-10

## 2017-07-10 NOTE — PROGRESS NOTES
Paperwork for surgical referral faxed to Dr. Peña Morton again with new fax number. Pt relayed same.

## 2017-08-03 ENCOUNTER — OFFICE VISIT (OUTPATIENT)
Dept: PAIN MANAGEMENT | Age: 67
End: 2017-08-03

## 2017-08-03 VITALS
DIASTOLIC BLOOD PRESSURE: 87 MMHG | TEMPERATURE: 98.6 F | SYSTOLIC BLOOD PRESSURE: 118 MMHG | BODY MASS INDEX: 28.9 KG/M2 | RESPIRATION RATE: 17 BRPM | WEIGHT: 148 LBS | HEART RATE: 98 BPM

## 2017-08-03 DIAGNOSIS — M79.7 FIBROMYALGIA: Primary | ICD-10-CM

## 2017-08-03 DIAGNOSIS — M96.1 LUMBAR POST-LAMINECTOMY SYNDROME: ICD-10-CM

## 2017-08-03 DIAGNOSIS — Z79.899 ENCOUNTER FOR LONG-TERM (CURRENT) USE OF HIGH-RISK MEDICATION: ICD-10-CM

## 2017-08-03 DIAGNOSIS — R91.8 MULTIPLE LUNG NODULES: ICD-10-CM

## 2017-08-03 DIAGNOSIS — M15.9 PRIMARY OSTEOARTHRITIS INVOLVING MULTIPLE JOINTS: ICD-10-CM

## 2017-08-03 DIAGNOSIS — G43.719 INTRACTABLE CHRONIC MIGRAINE WITHOUT AURA AND WITHOUT STATUS MIGRAINOSUS: ICD-10-CM

## 2017-08-03 DIAGNOSIS — J18.9 RECURRENT PNEUMONIA: ICD-10-CM

## 2017-08-03 DIAGNOSIS — M54.16 LUMBAR NEURITIS: ICD-10-CM

## 2017-08-03 DIAGNOSIS — D80.3 IGG DEFICIENCY (HCC): ICD-10-CM

## 2017-08-03 DIAGNOSIS — F51.04 CHRONIC INSOMNIA: ICD-10-CM

## 2017-08-03 DIAGNOSIS — E07.9 THYROID DISEASE: ICD-10-CM

## 2017-08-03 DIAGNOSIS — M47.812 CERVICAL SPONDYLOSIS WITHOUT MYELOPATHY: ICD-10-CM

## 2017-08-03 DIAGNOSIS — R10.84 GENERALIZED ABDOMINAL PAIN: ICD-10-CM

## 2017-08-03 DIAGNOSIS — M48.02 CERVICAL SPINAL STENOSIS: ICD-10-CM

## 2017-08-03 DIAGNOSIS — E53.8 VITAMIN B 12 DEFICIENCY: ICD-10-CM

## 2017-08-03 DIAGNOSIS — E03.9 ACQUIRED HYPOTHYROIDISM: ICD-10-CM

## 2017-08-03 LAB
ALCOHOL UR POC: NORMAL
AMPHETAMINES UR POC: NEGATIVE
BARBITURATES UR POC: NEGATIVE
BENZODIAZEPINES UR POC: NORMAL
BUPRENORPHINE UR POC: NORMAL
CANNABINOIDS UR POC: NEGATIVE
CARISOPRODOL UR POC: NORMAL
COCAINE UR POC: NEGATIVE
FENTANYL UR POC: NORMAL
MDMA/ECSTASY UR POC: NEGATIVE
METHADONE UR POC: NEGATIVE
METHAMPHETAMINE UR POC: NEGATIVE
METHYLPHENIDATE UR POC: NEGATIVE
OPIATES UR POC: NEGATIVE
OXYCODONE UR POC: NORMAL
PHENCYCLIDINE UR POC: NEGATIVE
PROPOXYPHENE UR POC: NORMAL
TRAMADOL UR POC: NORMAL
TRICYCLICS UR POC: NEGATIVE

## 2017-08-03 RX ORDER — OXYCODONE HYDROCHLORIDE 10 MG/1
5-10 TABLET ORAL
Qty: 120 TAB | Refills: 0 | Status: SHIPPED | OUTPATIENT
Start: 2017-08-03 | End: 2017-08-22

## 2017-08-03 RX ORDER — DEXTROAMPHETAMINE SACCHARATE, AMPHETAMINE ASPARTATE, DEXTROAMPHETAMINE SULFATE AND AMPHETAMINE SULFATE 7.5; 7.5; 7.5; 7.5 MG/1; MG/1; MG/1; MG/1
30 TABLET ORAL 3 TIMES DAILY
Qty: 90 TAB | Refills: 0 | Status: SHIPPED | OUTPATIENT
Start: 2017-08-03 | End: 2017-09-01 | Stop reason: SDUPTHER

## 2017-08-03 RX ORDER — CYANOCOBALAMIN 1000 UG/ML
1000 INJECTION, SOLUTION INTRAMUSCULAR; SUBCUTANEOUS ONCE
Qty: 1 ML | Refills: 0
Start: 2017-08-03 | End: 2017-08-03

## 2017-08-03 RX ORDER — LEVOTHYROXINE AND LIOTHYRONINE 19; 4.5 UG/1; UG/1
90 TABLET ORAL DAILY
COMMUNITY

## 2017-08-03 NOTE — PROGRESS NOTES
HISTORY OF PRESENT ILLNESS  Seth Ray is a 73 y.o. female. HPI she returns for follow-up of chronic, severe pain which is widespread and multifactorial  Since last seen, she received an IV infusion of IgG on 7/20. Following the infusion she noted the production of green mucus with fever as high as 100.1. This has persisted and a CAT scan of the chest will be arranged she has had abnormal CAT scanning in the past.  On 7/26, she was seen at a local emergency room and was given breathing treatments and 30 mg of prednisone for 5 days along with Diflucan. Interestingly, during the time she was on the prednisone she did not experience any fever. She indicates that her headaches have largely resolved secondary to drinking H2Mob. The risks of chronic high caffeine consumption were discussed with patient and her  who accompanied her throughout the visit as well as the risk of developing rebound headaches particularly if she was to come off of the IntooBR. LIAM abruptly. She also notes that she is scheduled for autonomic testing on 818 but will have to reschedule this because of her comorbidities. Pain level today 1 out of 10, outcome 9/28,(The lower the upper number, the better the outcome)  Physical activity and mobility remain significantly curtailed, sleep is poor, mood is fair. No side effects from medication    A current review of the  does not identify any inconsistency. UDS obtained and reviewed; formal confirmation from laboratory is pending.'      Review of Systems   Constitutional: Positive for malaise/fatigue. Gastrointestinal: Positive for constipation. Neurological: Positive for sensory change (intermittent numbness right hand after use) and weakness (generalized). Psychiatric/Behavioral: The patient has insomnia. All other systems reviewed and are negative. Physical Exam   Constitutional: She is oriented to person, place, and time. She appears distressed.    HENT: Head: Normocephalic and atraumatic. Right Ear: External ear normal.   Left Ear: External ear normal.   Nose: Nose normal.   Mouth/Throat: Oropharynx is clear and moist. No oropharyngeal exudate. Eyes: Conjunctivae and EOM are normal. Pupils are equal, round, and reactive to light. Right eye exhibits no discharge. Left eye exhibits no discharge. No scleral icterus. Neck: Normal range of motion. Neck supple. No thyromegaly present. Cardiovascular: Normal rate, regular rhythm and normal heart sounds. Pulmonary/Chest: Effort normal and breath sounds normal. No respiratory distress. She has no wheezes. She has no rales. She exhibits no tenderness. Abdominal: Soft. She exhibits no distension. There is no tenderness. There is no rebound and no guarding. Musculoskeletal: Normal range of motion. Neurological: She is alert and oriented to person, place, and time. She has normal reflexes. No cranial nerve deficit. She exhibits normal muscle tone. Coordination normal.   Skin: Skin is warm and dry. No rash noted. Psychiatric: She has a normal mood and affect. Her behavior is normal. Judgment and thought content normal.   Nursing note and vitals reviewed. ASSESSMENT and PLAN  Encounter Diagnoses   Name Primary?  Fibromyalgia Yes    Encounter for long-term (current) use of high-risk medication     Recurrent pneumonia     Generalized abdominal pain     Lumbar neuritis     Thyroid disease     Vitamin B 12 deficiency     Intractable chronic migraine without aura and without status migrainosus     Chronic insomnia     Acquired hypothyroidism     Primary osteoarthritis involving multiple joints     Multiple lung nodules     IgG deficiency (HCC)     Cervical spondylosis without myelopathy     Cervical spinal stenosis     Lumbar post-laminectomy syndrome      She is scheduled to follow-up with her endocrinologist with respect to her thyroid function.   She will also discuss the possibility of being maintained on low-dose prednisone as she is of the opinion that this has controlled her fever and is given her a general sense of well-being. I explained that this is very likely a transient side effect from the prednisone usage and that the risks of being on long-term prednisone far outweigh the short-term benefits but she will discuss this further with Dr. Betina Dao and will have further testing done. No concerns are raised for misuse, abuse, or diversion. 1. Pain medications are prescribed with the objective of pain relief and improved physical and psychosocial function in this patient. 2. Counseled patient on proper use of prescribed medications and reviewed opioid contract. 3. Counseled patient about chronic medical conditions and their relationship to anxiety and depression and recommended mental health support as needed. 4. Reviewed with patient self-help tools, home exercise, and lifestyle changes to assist the patient in self-management of symptoms. 5. Advised patient to have a primary care provider to continue care for health maintenance and general medical conditions and support for referral to specialty care as needed. 6. Reviewed with patient the treatment plan, goals of treatment plan, and limitations of treatment plan, to include the potential for side effects from medications and procedures. If side effects occur, it is the responsibility of the patient to inform the clinic so that a change in the treatment plan can be made in a safe manner. The patient is advised that stopping prescribed medication may cause an increase in symptoms and possible medication withdrawal symptoms. The patient is informed an emergency room evaluation may be necessary if this occurs. DISPOSITION: The patients condition and plan were discussed at length and all questions were answered. The patient agrees with the plan.     Counseling occupied > 50% of visit:  Total time: 45 minutes

## 2017-08-03 NOTE — PROGRESS NOTES
Nursing Notes    Patient presents to the office today in follow-up. Patient rates her pain at 1/10 on the numerical pain scale. Reviewed medications with counts as follows:    Rx Date filled Qty Dispensed Pill Count Last Dose Short   adderall 30 7/3/17 90 6 8/3/17 no   rafiq 10 7/3/17 120 55 8/3/17 no         Comments: has appt with surgeon today for mediport consult    POC UDS was performed in office today    Any new labs or imaging since last appointment? YES, chest xray    Have you been to an emergency room (ER) or urgent care clinic since your last visit? YES  , \"green mucus\" coming from lungs          Have you been hospitalized since your last visit? NO     If yes, where, when, and reason for visit? Have you seen or consulted any other health care providers outside of the [address] [place] [address]  since your last visit? YES     If yes, where, when, and reason for visit? [facility]    Ms. Ray has a reminder for a \"due or due soon\" health maintenance. I have asked that she contact her primary care provider for follow-up on this health maintenance.

## 2017-08-07 ENCOUNTER — TELEPHONE (OUTPATIENT)
Dept: PAIN MANAGEMENT | Age: 67
End: 2017-08-07

## 2017-08-08 DIAGNOSIS — R10.84 GENERALIZED ABDOMINAL PAIN: Primary | ICD-10-CM

## 2017-08-08 DIAGNOSIS — J18.9 RECURRENT PNEUMONIA: ICD-10-CM

## 2017-08-11 ENCOUNTER — HOSPITAL ENCOUNTER (OUTPATIENT)
Dept: CT IMAGING | Age: 67
Discharge: HOME | End: 2017-08-11
Attending: PSYCHIATRY & NEUROLOGY
Payer: MEDICARE

## 2017-08-11 DIAGNOSIS — J18.9 RECURRENT PNEUMONIA: ICD-10-CM

## 2017-08-11 DIAGNOSIS — R10.84 GENERALIZED ABDOMINAL PAIN: ICD-10-CM

## 2017-08-11 LAB — CREAT UR-MCNC: 0.9 MG/DL (ref 0.6–1.3)

## 2017-08-11 PROCEDURE — 74011636320 HC RX REV CODE- 636/320: Performed by: PSYCHIATRY & NEUROLOGY

## 2017-08-11 PROCEDURE — 82565 ASSAY OF CREATININE: CPT

## 2017-08-11 PROCEDURE — 74177 CT ABD & PELVIS W/CONTRAST: CPT

## 2017-08-11 RX ADMIN — IOPAMIDOL 100 ML: 612 INJECTION, SOLUTION INTRAVENOUS at 11:30

## 2017-08-14 ENCOUNTER — TELEPHONE (OUTPATIENT)
Dept: PAIN MANAGEMENT | Age: 67
End: 2017-08-14

## 2017-08-14 NOTE — TELEPHONE ENCOUNTER
Pt called asking if someone could call her with the test results of her ct chest/abdomen done on Fri 8/11

## 2017-08-14 NOTE — TELEPHONE ENCOUNTER
The pt called the office to report that she had completed the CT scan of her lungs and abdomen. She would like to get the results. I spoke to Dr. Luis Miguel Mckeon and he has reviewed the results. He recommends that the pt follow up with both her pulmonologist and GI doctor according to the results. I called and spoke to the pt. She was informed of the above provider recommendations. The pt states that she is going to see an abdominal surgeon at the end of this week. She will get the imaging results from where she had the CT done and bring them with her to her appt. The pt reported that she is coughing up green sputum and would like to know if Dr. Luis Miguel Mckeon can prescribe her anything. She states that she is still running a low grade temp. This was brought to the provider and he states that the pt will need to contact the pulmonologist. I called the pt back and relayed this information to her. She verbalized understanding and has no further questions or concerns.

## 2017-08-22 ENCOUNTER — OFFICE VISIT (OUTPATIENT)
Dept: SURGERY | Age: 67
End: 2017-08-22

## 2017-08-22 VITALS
TEMPERATURE: 98.1 F | RESPIRATION RATE: 16 BRPM | BODY MASS INDEX: 28.66 KG/M2 | SYSTOLIC BLOOD PRESSURE: 116 MMHG | HEART RATE: 90 BPM | DIASTOLIC BLOOD PRESSURE: 82 MMHG | HEIGHT: 60 IN | WEIGHT: 146 LBS

## 2017-08-22 DIAGNOSIS — N81.6 RECTOCELE, FEMALE: Primary | ICD-10-CM

## 2017-08-22 DIAGNOSIS — R10.84 GENERALIZED ABDOMINAL PAIN: ICD-10-CM

## 2017-08-22 NOTE — PROGRESS NOTES
[facility]  Colon and Rectal Surgery  [id number] [place] [address], [address] [place] [address]                Colon and Rectal Surgery    Subjective:      Reji Ray is a 73 y.o. female who is being seen for second opinion evaluation of her abdominal pain. She is self referred. The patient currently has severe generalized abdominal/pelvic pain exacerbated with bowel movements. She complains of tenesmus, difficulty initiating defecation, and sometimes extreme pain in the pelvis and perirectal region post defecation. She has been on a array of stool softners and laxatives along with enema therapy. The patient is currently under pain management with Dr. Zarina Lamb. The patient does state that these symptoms have been improving slowly since she started to take [place] Thyroid prescribed by her Endocrinologist.  She is guardedly optimistic about this. The patient also is concerned by her rectocele. She has different opinions about whether she requires surgical therapy or not. The rectocele appears not to be significantly symptomatic, and it appears to bulge more into the vagina only with severe straining. She has been seen at the [facility] clinic previously ans is requesting referral for second opinion. The patient's significant past abdominal surgical history includes laparoscopic cholecystectomy, laparoscopic hysterectomy, and laparoscopic right colectomy for cecal volvulus (3/14/2013). The patient appears to have had no specific issues following these surgeries. However following her back surgery (11/16/2016), she appears to have progressively developed the aforementioned abdominal symptoms. The patient most recently underwent a CT of the abdomen and pelvis (8/11/2017) which showed:  IMPRESSION:   1.  Tiny lung nodules. Recommend follow-up CT in 6 months.   2.  Hepatic cyst in the left lobe.   3.  Small cystic structure adjacent to the greater curvature of the stomach. The source for this cystic structure is unclear. This cystic structure was not  present on the previous CT.   4.  No acute findings along the gastrointestinal tract.   5.  Mild anterior wedging at T6, unchanged. T12 anterior wedging with  associated pronounced degenerative changes at T12-L1, also unchanged.   6.  Postsurgical changes at L5-S1. The patient is already scheduled to see a pulmonologist for the lung nodules, and she is scheduled for gastric ultrasound for evaluation of the perigastric cystic structures. A colonoscopy was also performed by her gastroenterologist, Dr. Drea Parker, in Dec., 2016 with findings of a tortuous colon only as per the patient. The patient specifically was interested in knowing if any surgical therapy was available for her abdominal symptoms and if surgery was required for her rectocele.         Patient Active Problem List    Diagnosis Date Noted    Intractable chronic migraine without aura and without status migrainosus 02/07/2017    Chronic pain syndrome 01/17/2017    Lumbar facet arthropathy 01/17/2017    Cervical facet syndrome 11/18/2016    Generalized abdominal pain 10/05/2016    FUO (fever of unknown origin) 10/05/2016    Lumbar post-laminectomy syndrome 05/03/2016    Encounter for long-term current use of high risk medication 12/02/2015    Vitamin D deficiency 12/02/2015    S/P lumbar spinal fusion 12/01/2015    Neuritis 12/01/2015    Chronic low back pain     Spondylolisthesis at L5-S1 level 11/16/2015    S/P lumbar fusion 11/16/2015    Spinal stenosis, lumbar region, without neurogenic claudication 10/19/2015    Spondylolisthesis of lumbar region 07/30/2015    Cervical spondylosis without myelopathy 04/28/2015    Cervical spinal stenosis 04/28/2015    IgG deficiency ([facility] [address] 01/06/2015    Spasmodic torticollis 10/09/2014    Multiple lung nodules 09/12/2014    Recurrent aphthous stomatitis 02/19/2014    Low TSH level 02/19/2014    Enthesopathy of hip region 09/24/2013    CTS (carpal tunnel syndrome) 01/31/2013    Tendonitis, bicipital 01/31/2013    Osteoarthritis 01/31/2013    Other and unspecified hyperlipidemia 01/02/2013    Unspecified vitamin D deficiency 01/02/2013    Anemia 01/02/2013    Hyperglycemia 01/02/2013    Lumbosacral spondylosis without myelopathy 09/11/2012    Degeneration of lumbar or lumbosacral intervertebral disc 09/11/2012    Intractable migraine 07/13/2012    Essential hypertension 06/14/2012    Sinusitis 04/19/2012    Recurrent acute sinusitis 03/20/2012    Rectocele, female 02/22/2012    Hypothyroid 01/26/2012    Chronic insomnia 12/01/2011    Elevated CK 12/01/2011    Secondary erythrocytosis 12/01/2011    Diaphoresis 10/04/2011    Dehydration 10/04/2011    Vitamin B 12 deficiency 10/04/2011    Encounter for long-term (current) use of high-risk medication     Pain in joint, pelvic region and thigh     Lumbago     Fatigue     Thyroid disease     Hyperlipidemia     Depression     Headache     Fibromyalgia      Past Medical History:   Diagnosis Date    ADD (attention deficit disorder)     Arthritis     Chronic low back pain     Cold hands and feet     Depression     ADD    Esophageal reflux     Fall at home     Fatigue     Fibromyalgia     GERD (gastroesophageal reflux disease)     H/O dehydration     Headache     Hiatal hernia 2004    Hyperlipidemia     Hypertension     Hypoglycemia     IgG deficiency (HCC)     Lumbago     Migraine     Nausea & vomiting     Pain in joint, pelvic region and thigh     Painful sex     sometimes    Poor appetite 2001    S/P lumbar fusion 11/17/2015    1. Bilateral L5-S1 laminotomy, medial facetectomy, foraminotomy. 2. L5-S1 transforaminal lumbar interbody fusion with PEEK cage and demineralized bone matrix. 3. Posterolateral fusion L5-S1. 4. Segmental spinal instrumentation, L5-S1, DePuy Expedium type.   11/16/2015 by Dr. Hermelinda Novak     Seizures [facility]) 2013    ONE TIME EVENT    Sinus infection     SOB (shortness of breath)     Spinal stenosis, lumbar region, without neurogenic claudication 10/19/2015    Strep throat     Swallowing difficulty     Swelling     legs and feet     Thoracic or lumbosacral neuritis or radiculitis, unspecified     Thyroid disease     Thyroid disease     Tortuous colon       Past Surgical History:   Procedure Laterality Date    HX BREAST AUGMENTATION      rejected and subsequently removed    HX CHOLECYSTECTOMY      HX HEENT  4/2014    FACIAL MUSCLE SX-ON TEMPLE-due to muscle spasm    HX HYSTERECTOMY      HX LUMBAR FUSION  11-16-15    L5/S1 Laminectomy Fusion/TLIF    HX ORTHOPAEDIC  11/16/15    back surgery    HX OTHER SURGICAL      tooth extraction on 6/16 and root canal on 6/20     HX PELVIC LAPAROSCOPY        Social History   Substance Use Topics    Smoking status: Former Smoker    Smokeless tobacco: Never Used    Alcohol use No      Family History   Problem Relation Age of Onset    Hypertension Mother     Cancer Mother      Breast cancer    Heart Disease Father       Prior to Admission medications    Medication Sig Start Date End Date Taking? Authorizing Provider   thyroid, Pork, (ARMOUR THYROID) 30 mg tablet Take 45 mg by mouth daily. Yes Historical Provider   dextroamphetamine-amphetamine (ADDERALL) 30 mg tablet Take 1 Tab by mouth three (3) times dailyIndications: ATTENTION-DEFICIT HYPERACTIVITY DISORDER, inattention. Max Daily Amount: 3 Tabs 8/3/17 9/2/17 Yes Stephen Jason MD   OTHER 1 liter of 1/2 normal saline with 20 april of KCL. 1 liter over 4 hours 6/15/17  Yes Stephen Jason MD   levocetirizine (XYZAL) 5 mg tablet Take 1 Tab by mouth daily as needed.  5/2/17  Yes Stephen Jason MD   estradiol (VIVELLE) 0.1 mg/24 hr Apply one patch transdermally two times a week on Wednesday and Saturday 5/2/17  Yes Stephen Jason MD   ZOLMitriptan (ZOMIG) 5 mg tablet TAKE 1 TABLET BY MOUTH AS NEEDED FOR MIGRAINE FOR UP TO 90 DAYS  Indications: MIGRAINE 5/2/17  Yes Seth Monique MD   lisinopril (PRINIVIL, ZESTRIL) 20 mg tablet TAKE 1 TABLET BY MOUTH TWO TIMES A DAY 90 day supply  Indications: hypertension 4/25/17  Yes Seth Monique MD   SUCRALFATE PO Take  by mouth. Yes Historical Provider   cycloSPORINE (RESTASIS) 0.05 % ophthalmic emulsion Administer 1 Drop to both eyes two (2) times a day. Yes Historical Provider   CALCIUM PO Take 1,000 mg by mouth. Yes Historical Provider   acetaminophen (TYLENOL) 500 mg tablet 1,000 mg. Yes Historical Provider   OMEGA-3S-DHA-EPA-FISH OIL PO Take  by Mouth. Yes Historical Provider   therapeutic multivitamin (THERAGRAN) tablet Take 1 Tab by Mouth Once a Day. 1500 MG DAILY   Yes Historical Provider   buPROPion (WELLBUTRIN) 75 mg tablet Take  by mouth two (2) times a day. Yes Historical Provider   ergocalciferol (ERGOCALCIFEROL) 50,000 unit capsule TAKE 1 CAPSULE BY MOUTH EVERY 7 DAYS   90 day supply  Indications: VITAMIN D DEFICIENCY 11/4/16  Yes Seth Monique MD   LORazepam (ATIVAN) 2 mg tablet Take 2 mg by mouth. 5/16/16  Yes Historical Provider   PREGNENOLONE Take 400 mg by mouth daily. Indications: for stress hormone   Yes Historical Provider   Syringe, Disposable, 1 mL syrg Use to inject b-12 sq 10/2/15  Yes Seth Monique MD   Needle, Disp, 27 G 27 x 1 1/4 \" ndle Use with 1ml syringe to inject b-12 10/2/15  Yes Seth Monique MD   immune globulin 10%, human, (PRIVIGEN) 10 % infusion 30 g by IntraVENous route every thirty (30) days. Yes Historical Provider   OTHER IVG infusions every 4 weeks-Dr. Jamison Fofana   Yes Historical Provider   cyanocobalamin (VITAMIN B12) 1,000 mcg/mL injection 1,000 mcg by IntraMUSCular route every thirty (30) days. Yes Historical Provider   oxyCODONE IR (ROXICODONE) 10 mg tab immediate release tablet Take 0.5-1 Tabs by mouth four (4) times daily as needed for up to 30 days. Max Daily Amount: 40 mg.  Indications: Pain 8/3/17 9/2/17  Seth Monique MD   oxyCODONE ER (OXYCONTIN) 10 mg ER tablet 1 po bid --- chronic pain  Indications: Chronic Pain, Severe Pain 2/7/17   Shweta Marquez MD   pseudoephedrine (SUDAFED) 30 mg tablet Take 1 Tab by mouth two (2) times a day. 12/20/16   Shweta Marquez MD     Current Outpatient Prescriptions   Medication Sig    thyroid, Pork, (ARMOUR THYROID) 30 mg tablet Take 45 mg by mouth daily.  dextroamphetamine-amphetamine (ADDERALL) 30 mg tablet Take 1 Tab by mouth three (3) times dailyIndications: ATTENTION-DEFICIT HYPERACTIVITY DISORDER, inattention. Max Daily Amount: 3 Tabs    OTHER 1 liter of 1/2 normal saline with 20 april of KCL. 1 liter over 4 hours    levocetirizine (XYZAL) 5 mg tablet Take 1 Tab by mouth daily as needed.  estradiol (VIVELLE) 0.1 mg/24 hr Apply one patch transdermally two times a week on Wednesday and Saturday    ZOLMitriptan (ZOMIG) 5 mg tablet TAKE 1 TABLET BY MOUTH AS NEEDED FOR MIGRAINE FOR UP TO 90 DAYS  Indications: MIGRAINE    lisinopril (PRINIVIL, ZESTRIL) 20 mg tablet TAKE 1 TABLET BY MOUTH TWO TIMES A DAY 90 day supply  Indications: hypertension    SUCRALFATE PO Take  by mouth.  cycloSPORINE (RESTASIS) 0.05 % ophthalmic emulsion Administer 1 Drop to both eyes two (2) times a day.  CALCIUM PO Take 1,000 mg by mouth.  acetaminophen (TYLENOL) 500 mg tablet 1,000 mg.  OMEGA-3S-DHA-EPA-FISH OIL PO Take  by Mouth.  therapeutic multivitamin (THERAGRAN) tablet Take 1 Tab by Mouth Once a Day. 1500 MG DAILY    buPROPion (WELLBUTRIN) 75 mg tablet Take  by mouth two (2) times a day.  ergocalciferol (ERGOCALCIFEROL) 50,000 unit capsule TAKE 1 CAPSULE BY MOUTH EVERY 7 DAYS   90 day supply  Indications: VITAMIN D DEFICIENCY    LORazepam (ATIVAN) 2 mg tablet Take 2 mg by mouth.  PREGNENOLONE Take 400 mg by mouth daily.  Indications: for stress hormone    Syringe, Disposable, 1 mL syrg Use to inject b-12 sq    Needle, Disp, 27 G 27 x 1 1/4 \" ndle Use with 1ml syringe to inject b-12    immune globulin 10%, human, (PRIVIGEN) 10 % infusion 30 g by IntraVENous route every thirty (30) days.  OTHER IVG infusions every 4 weeks-Dr. Regulo Dupont    cyanocobalamin (VITAMIN B12) 1,000 mcg/mL injection 1,000 mcg by IntraMUSCular route every thirty (30) days.  oxyCODONE IR (ROXICODONE) 10 mg tab immediate release tablet Take 0.5-1 Tabs by mouth four (4) times daily as needed for up to 30 days. Max Daily Amount: 40 mg. Indications: Pain    oxyCODONE ER (OXYCONTIN) 10 mg ER tablet 1 po bid --- chronic pain  Indications: Chronic Pain, Severe Pain    pseudoephedrine (SUDAFED) 30 mg tablet Take 1 Tab by mouth two (2) times a day. No current facility-administered medications for this visit. Allergies   Allergen Reactions    Avelox [Moxifloxacin] Other (comments)     Other reaction(s): other/intolerance  Seizures  Nerve pain      Azithromycin Rash and Other (comments)     Other reaction(s): unknown  Pt states is not allergic to this med  Acute toxic reaction    Bactrim [Sulfamethoprim Ds] Other (comments)     Severe abdominal pain    Bupropion Other (comments)     Muscle pains  All antidepressants.      Ciprofloxacin Other (comments)     Other reaction(s): other/intolerance  Lowers bp  AMS    Duloxetine Other (comments)     \"shocks in brain\"    Focalin [Dexmethylphenidate] Other (comments)     Pt denies any allergic    Keflex [Cephalexin] Other (comments)     Abdominal pain      Macrobid [Nitrofurantoin Monohyd/M-Cryst] Swelling     Other reaction(s): other/intolerance  Swelling of colon  Other reaction(s): other/intolerance  Swelling of colon    Other Medication Other (comments)     Dissolving sutures    Peridex [Chlorhexidine Gluconate] Swelling     numbness    Phenergan [Promethazine] Other (comments)     Other reaction(s): neurological reaction  \" i see things\"  hullucinations    Sucralfate Myalgia     Patient able to tolerate now     Sulfa (Sulfonamide Antibiotics) Hives  Sulfate Salt Unknown (comments)    Venlafaxine Itching    Yeast, Dried Other (comments) and Hives     Patient reported extreme lethargy, bloating/abdominal pain, and digestive problems when consumes yeast or foods containing yeast       Review of Systems:    Pertinent items are noted in the History of Present Illness. Objective:        Visit Vitals    /82    Pulse 90    Temp 98.1 °F (36.7 °C) (Oral)    Resp 16    Ht 5' (1.524 m)    Wt 66.2 kg (146 lb)    BMI 28.51 kg/m2       Physical Exam:   GENERAL: alert, cooperative, no distress, appears stated age  LYMPHATIC: Cervical, supraclavicular, and axillary nodes normal.   THROAT & NECK: normal and no erythema or exudates noted. LUNG: clear to auscultation bilaterally  HEART: regular rate and rhythm, S1, S2 normal, no murmur, click, rub or gallop  ABDOMEN: soft, non-tender. Bowel sounds normal. No masses,  no organomegaly  EXTREMITIES:  extremities normal, atraumatic, no cyanosis or edema  SKIN: Normal.  NEUROLOGIC: negative  PSYCHIATRIC: non focal     Anorectal:  With the patient in the prone position the anus appeared within normal limits with small benign sentinel tags. Digital rectal examination revealed increased sphincter tone and squeeze pressure. Palpation revealed no masses and a moderate rectocele without any tenderness. Anoscopy revealed mild asymptomatic appearing grade 1 internal hemorrhoid findings. No rectal mucosal prolapse nor any gross evidence of rectal intussusception was present with straining. Assessment:     1. Abdominal pain, generalized from unclear but non-surgical causes. 2. Moderate but relatively asymptomatic rectocele. Plan:     I had a lengthy discussion with the patient and her  about the patient's concerns.     First of all, I absolutely stressed that there is no role for any abdominal surgery for her current condition unless there develops an acute surgical problems such as bowel obstruction, volvulus, bowel ischemia, etc.  The patient and her  understands this. I recommended continuation of the [place] Thyroid and the bowel regimen proposed by Dr. Biju Pereyra. With regard to the rectocele, I do not see any indication for surgical intervention. I did recommend Dr. Juan Francisco Lowry if the patient wished a second urogynecology consultantation. The patient will keep me informed of her progress and return to clinic as needed.           Pushpa Tavarez MD, FACS, FASCRS  Colon and Rectal Surgery  [facility]  Office [phone]  Fax     [phone]  8/22/2017  12:50 PM

## 2017-08-22 NOTE — PATIENT INSTRUCTIONS
If you have any questions or concerns about today's appointment, the verbal and/or written instructions you were given for follow up care, please call our office at [phone].     [facility] - [address]    [phone] office  [phone]

## 2017-09-01 ENCOUNTER — OFFICE VISIT (OUTPATIENT)
Dept: PAIN MANAGEMENT | Age: 67
End: 2017-09-01

## 2017-09-01 VITALS
RESPIRATION RATE: 12 BRPM | WEIGHT: 142 LBS | HEART RATE: 77 BPM | HEIGHT: 60 IN | BODY MASS INDEX: 27.88 KG/M2 | DIASTOLIC BLOOD PRESSURE: 77 MMHG | TEMPERATURE: 95.6 F | SYSTOLIC BLOOD PRESSURE: 129 MMHG

## 2017-09-01 DIAGNOSIS — M15.9 PRIMARY OSTEOARTHRITIS INVOLVING MULTIPLE JOINTS: ICD-10-CM

## 2017-09-01 DIAGNOSIS — Z79.899 ENCOUNTER FOR LONG-TERM CURRENT USE OF HIGH RISK MEDICATION: ICD-10-CM

## 2017-09-01 DIAGNOSIS — E07.9 THYROID DISEASE: ICD-10-CM

## 2017-09-01 DIAGNOSIS — M79.7 FIBROMYALGIA: ICD-10-CM

## 2017-09-01 DIAGNOSIS — R10.84 GENERALIZED ABDOMINAL PAIN: Primary | ICD-10-CM

## 2017-09-01 DIAGNOSIS — M43.17 SPONDYLOLISTHESIS AT L5-S1 LEVEL: ICD-10-CM

## 2017-09-01 DIAGNOSIS — E03.9 ACQUIRED HYPOTHYROIDISM: ICD-10-CM

## 2017-09-01 DIAGNOSIS — M47.812 CERVICAL SPONDYLOSIS WITHOUT MYELOPATHY: ICD-10-CM

## 2017-09-01 DIAGNOSIS — F51.04 CHRONIC INSOMNIA: ICD-10-CM

## 2017-09-01 DIAGNOSIS — M96.1 LUMBAR POST-LAMINECTOMY SYNDROME: ICD-10-CM

## 2017-09-01 DIAGNOSIS — G89.4 CHRONIC PAIN SYNDROME: ICD-10-CM

## 2017-09-01 DIAGNOSIS — E53.8 VITAMIN B 12 DEFICIENCY: ICD-10-CM

## 2017-09-01 DIAGNOSIS — M47.816 LUMBAR FACET ARTHROPATHY: ICD-10-CM

## 2017-09-01 DIAGNOSIS — G43.719 INTRACTABLE CHRONIC MIGRAINE WITHOUT AURA AND WITHOUT STATUS MIGRAINOSUS: ICD-10-CM

## 2017-09-01 DIAGNOSIS — M54.16 LUMBAR NEURITIS: ICD-10-CM

## 2017-09-01 DIAGNOSIS — G56.03 BILATERAL CARPAL TUNNEL SYNDROME: ICD-10-CM

## 2017-09-01 DIAGNOSIS — R91.8 MULTIPLE LUNG NODULES: ICD-10-CM

## 2017-09-01 DIAGNOSIS — M48.02 CERVICAL SPINAL STENOSIS: ICD-10-CM

## 2017-09-01 DIAGNOSIS — D80.3 IGG DEFICIENCY (HCC): ICD-10-CM

## 2017-09-01 DIAGNOSIS — M47.812 CERVICAL FACET SYNDROME: ICD-10-CM

## 2017-09-01 RX ORDER — DEXTROAMPHETAMINE SACCHARATE, AMPHETAMINE ASPARTATE, DEXTROAMPHETAMINE SULFATE AND AMPHETAMINE SULFATE 7.5; 7.5; 7.5; 7.5 MG/1; MG/1; MG/1; MG/1
30 TABLET ORAL 3 TIMES DAILY
Qty: 90 TAB | Refills: 0 | Status: SHIPPED | OUTPATIENT
Start: 2017-09-01 | End: 2017-10-10 | Stop reason: SDUPTHER

## 2017-09-01 RX ORDER — OXYCODONE HYDROCHLORIDE 10 MG/1
5-10 TABLET ORAL
Qty: 120 TAB | Refills: 0 | Status: SHIPPED | OUTPATIENT
Start: 2017-09-01 | End: 2017-10-10 | Stop reason: SDUPTHER

## 2017-09-01 RX ORDER — CYANOCOBALAMIN 1000 UG/ML
1000 INJECTION, SOLUTION INTRAMUSCULAR; SUBCUTANEOUS ONCE
Qty: 1 ML | Refills: 0
Start: 2017-09-01 | End: 2017-09-01

## 2017-09-01 NOTE — PROGRESS NOTES
HISTORY OF PRESENT ILLNESS  Michael Ray is a 73 y.o. female. HPI she returns for follow-up of chronic, severe pain which is widespread and multifactorial  On August 8, she was treated for her fifth lung infection since March 2017. On August 16 she received her IgG infusion. On 823, she underwent bronchoscopy which revealed mild tracheobronchomalacia with mild dynamic airway collapse and moderate basilar bronchiectasis    On 827, she stopped her topical hormone cream due to nausea and headaches. She consulted with her endocrinologist Dr. Katia Luna who suggested that she continue the cream on an every other day regimen. On August 28 she was prescribed a new antibiotic IV every 12 hours. She is scheduled on 9/25 to undergo endoscopic ultrasound under sedation. On August 22, she underwent a colorectal exam which has caused her to have increasing severe pain in her umbilicus and low back to the point where she had to stay in bed for 5 days as result of the pain. Prior to that, she had noted that with the adjustment of her [place] Thyroid, her pain had decreased to the point where she had not required any medication whatsoever. A CAT scan of the abdomen and pelvis will be arranged in light of her recrudescence of generalized abdominal pain associated with nausea. Pain level today 6 out of 10, outcome 15/28, (The lower the upper number, the better the outcome)  Physical activity and mobility, mood, and sleep are all poor. No reported side effects. A current review of the  does not identify any inconsistency. Review of Systems   Constitutional: Positive for malaise/fatigue. Gastrointestinal: Positive for constipation. Neurological: Positive for sensory change (intermittent numbness right hand after use) and weakness (generalized). Psychiatric/Behavioral: The patient has insomnia. All other systems reviewed and are negative.       Physical Exam   Constitutional: She is oriented to person, place, and time. She appears distressed. HENT:   Head: Normocephalic and atraumatic. Right Ear: External ear normal.   Left Ear: External ear normal.   Nose: Nose normal.   Mouth/Throat: Oropharynx is clear and moist. No oropharyngeal exudate. Eyes: Conjunctivae and EOM are normal. Pupils are equal, round, and reactive to light. Right eye exhibits no discharge. Left eye exhibits no discharge. No scleral icterus. Neck: Normal range of motion. Neck supple. No thyromegaly present. Cardiovascular: Normal rate, regular rhythm and normal heart sounds. Pulmonary/Chest: Effort normal and breath sounds normal. No respiratory distress. She has no wheezes. She has no rales. She exhibits no tenderness. Abdominal: Soft. She exhibits no distension. There is no tenderness. There is no rebound and no guarding. Musculoskeletal: Normal range of motion. Neurological: She is alert and oriented to person, place, and time. She has normal reflexes. No cranial nerve deficit. She exhibits normal muscle tone. Coordination normal.   Skin: Skin is warm and dry. No rash noted. Psychiatric: She has a normal mood and affect. Her behavior is normal. Judgment and thought content normal.   Nursing note and vitals reviewed. ASSESSMENT and PLAN  Encounter Diagnoses   Name Primary?     Generalized abdominal pain Yes    Intractable chronic migraine without aura and without status migrainosus     Bilateral carpal tunnel syndrome     Vitamin B 12 deficiency     Chronic pain syndrome     Lumbar facet arthropathy     Cervical facet syndrome     Lumbar post-laminectomy syndrome     Encounter for long-term current use of high risk medication     Spondylolisthesis at L5-S1 level     Cervical spondylosis without myelopathy     Cervical spinal stenosis     IgG deficiency (HCC)     Multiple lung nodules     Primary osteoarthritis involving multiple joints     Chronic insomnia     Acquired hypothyroidism     Fibromyalgia  Lumbar neuritis     Thyroid disease      Treatment plan as noted above. She will continue on her current analgesic regimen as this is providing adequate pain control with improved functionality and minimal side effects. 1 month reassess her    No concerns are raised for misuse, abuse, or diversion. 1. Pain medications are prescribed with the objective of pain relief and improved physical and psychosocial function in this patient. 2. Counseled patient on proper use of prescribed medications and reviewed opioid contract. 3. Counseled patient about chronic medical conditions and their relationship to anxiety and depression and recommended mental health support as needed. 4. Reviewed with patient self-help tools, home exercise, and lifestyle changes to assist the patient in self-management of symptoms. 5. Advised patient to have a primary care provider to continue care for health maintenance and general medical conditions and support for referral to specialty care as needed. 6. Reviewed with patient the treatment plan, goals of treatment plan, and limitations of treatment plan, to include the potential for side effects from medications and procedures. If side effects occur, it is the responsibility of the patient to inform the clinic so that a change in the treatment plan can be made in a safe manner. The patient is advised that stopping prescribed medication may cause an increase in symptoms and possible medication withdrawal symptoms. The patient is informed an emergency room evaluation may be necessary if this occurs. DISPOSITION: The patients condition and plan were discussed at length and all questions were answered. The patient agrees with the plan.     Counseling occupied > 50% of visit:  Total time: 45 minutes

## 2017-09-01 NOTE — PROGRESS NOTES
Nursing Notes    Patient presents to the office today in follow-up. Patient rates her pain at 6/10 on the numerical pain scale. Reviewed medications with counts as follows:    Rx Date filled Qty Dispensed Pill Count Last Dose Short   adderall 30mg 08/03/17 90 9 today no   Oxycodone 10mg 08/03/17 120 78 today no                                  Comments:     POC UDS was not performed in office today    Any new labs or imaging since last appointment? YES, bronchoscopy and lab    Have you been to an emergency room (ER) or urgent care clinic since your last visit? NO            Have you been hospitalized since your last visit? NO     If yes, where, when, and reason for visit? Have you seen or consulted any other health care providers outside of the [address] [place] [address]  since your last visit? YES     If yes, where, when, and reason for visit? HM deferred to pcp.

## 2017-09-25 ENCOUNTER — OFFICE VISIT (OUTPATIENT)
Dept: ORTHOPEDIC SURGERY | Age: 67
End: 2017-09-25

## 2017-09-25 DIAGNOSIS — T85.848A PAIN FROM IMPLANTED HARDWARE, INITIAL ENCOUNTER: Primary | ICD-10-CM

## 2017-09-25 RX ORDER — METHYLPREDNISOLONE 4 MG/1
TABLET ORAL
Qty: 1 DOSE PACK | Refills: 0 | Status: SHIPPED | OUTPATIENT
Start: 2017-09-25 | End: 2017-10-10

## 2017-09-25 NOTE — PATIENT INSTRUCTIONS

## 2017-09-25 NOTE — MR AVS SNAPSHOT
Visit Information Date & Time Provider Department Dept. Phone Encounter #  
 9/25/2017 11:30 AM Neelima Ramirez MD [facility] [phone] [id number] Follow-up Instructions Return for with Dr. Christiano Batista, MRI/CT f/u. Your Appointments 9/29/2017  1:00 PM  
Follow Up with Flex Au MD  
[address] [place] [address] for Pain Management [facility]) Appt Note: pt f/u with [facility] [address]  
[phone] [address]  
  
    
 10/27/2017 10:40 AM  
Follow Up with Flex Au MD  
[address] [place] [address] for Pain Management [facility]) Appt Note: F/U with provider [address] [place] [address]  
[phone]  
  
    
 11/24/2017 10:40 AM  
Follow Up with Flex Au MD  
[address] [place] [address] for Pain Management [facility]) Appt Note: pt f/u  
 [address] [place] [address]  
[phone]  
  
    
 12/22/2017 10:40 AM  
Follow Up with Flex Au MD  
[address] [place] [address] for Pain Management [facility]) Appt Note: pt f/u  
 [address] [facility] [address]  
[phone] Upcoming Health Maintenance Date Due DTaP/Tdap/Td series (1 - Tdap) 10/17/1972 ZOSTER VACCINE AGE 60> 8/17/2011 GLAUCOMA SCREENING Q2Y 10/17/2016 Pneumococcal 65+ High/Highest Risk (1 of 2 - PCV13) 10/17/2016 MEDICARE YEARLY EXAM 10/17/2016 BREAST CANCER SCRN MAMMOGRAM 6/18/2017 INFLUENZA AGE 9 TO ADULT 8/1/2017 COLONOSCOPY 12/8/2026 Allergies as of 9/25/2017  Review Complete On: 9/25/2017 By: Geremias El Severity Noted Reaction Type Reactions Atorvastatin    Other (comments) ALL CHOLESTROL MEDS Avelox [Moxifloxacin]  06/26/2013    Other (comments) Other reaction(s): other/intolerance Seizures Other reaction(s): Unknown Reaction Other reaction(s): other/intolerance Seizures Nerve pain Azithromycin  09/24/2013    Rash, Other (comments) Other reaction(s): unknown Pt states is not allergic to this med Other reaction(s): unknown Pt states is not allergic to this med Acute toxic reaction Bactrim [Sulfamethoprim Ds]  12/20/2013    Other (comments) Severe abdominal pain Bupropion  10/24/2008    Other (comments) Other reaction(s): Muscle Pain Other reaction(s): other/intolerance Muscle pains All antidepressants. Muscle pains All antidepressants. Cefuroxime Axetil  02/24/2016    Unknown (comments) Ciprofloxacin  01/21/2014    Other (comments) Other reaction(s): other/intolerance Lowers bp Other reaction(s): Unknown Reaction Other reaction(s): other/intolerance Lowers bp AMS Diazepam    Other (comments) Duloxetine  10/24/2008    Other (comments)  
 shocks in brain Other reaction(s): other/intolerance \"shocks in brain\" \"shocks in brain\" Focalin [Dexmethylphenidate]  05/14/2014    Other (comments) Other reaction(s): Unknown Reaction Pt denies any allergic Keflex [Cephalexin]  09/12/2014    Other (comments) Other reaction(s): Abdominal pain Other reaction(s): gi distress Abdominal pain Macrobid [Nitrofurantoin Monohyd/m-cryst]  03/21/2014    Swelling Other reaction(s): other/intolerance Swelling of colon Other reaction(s): other/intolerance Swelling of colon Miralax [Polyethylene Glycol 3350]  09/01/2017    Unknown (comments) Nitrofurantoin Macrocrystalline  04/07/2014    Other (comments) Swelling of colon Other Medication    Other (comments), Swelling Other reaction(s): Unknown Reaction 
colon Dissolving sutures Oxymorphone  06/05/2017    Other (comments) Peridex [Chlorhexidine Gluconate]  08/03/2017    Swelling  
 numbness Phenergan [Promethazine]  07/04/2012    Other (comments) Other reaction(s): neurological reaction \" i see things\" Other reaction(s): Unknown Reaction Other reaction(s): neurological reaction \" i see things\" hullucinations Statins-hmg-coa Reductase Inhibitors  02/24/2016    Other (comments) Sucralfate  08/14/2009    Myalgia Other reaction(s): muscle/joint pain 
patient denies. Patient able to tolerate now Sulfa (Sulfonamide Antibiotics)  10/15/2013    Hives Sulfamethoxazole-trimethoprim  05/21/2016    Other (comments) Sulfate Salt    Unknown (comments) Venlafaxine  10/07/2010    Itching Other reaction(s): mild rash/itching Yeast    Other (comments) Yeast, Dried  11/04/2015    Other (comments), Hives Patient reported extreme lethargy, bloating/abdominal pain, and digestive problems when consumes yeast or foods containing yeast  
  
Current Immunizations  Never Reviewed No immunizations on file. Not reviewed this visit You Were Diagnosed With   
  
 Codes Comments Pain from implanted hardware, initial encounter    -  Primary ICD-10-CM: I63.541G ICD-9-CM: 996.70, 338.18 Vitals OB Status Smoking Status Hysterectomy Former Smoker Preferred Pharmacy Pharmacy Name Phone ON-SITE [facility], [address] [facility] [phone] Your Updated Medication List  
  
   
This list is accurate as of: 9/25/17 12:30 PM.  Always use your most recent med list.  
  
  
  
  
 acetaminophen 500 mg tablet Commonly known as:  TYLENOL Take 1,000 mg by mouth every six (6) hours as needed. ARMOUR THYROID 30 mg tablet Generic drug:  thyroid (Pork) Take 45 mg by mouth daily. CARAFATE 1 gram tablet Generic drug:  sucralfate Take 1 g by mouth four (4) times daily as needed. cyanocobalamin 1,000 mcg/mL injection Commonly known as:  VITAMIN B12  
1,000 mcg by IntraMUSCular route every thirty (30) days. dextroamphetamine-amphetamine 30 mg tablet Commonly known as:  ADDERALL Take 1 Tab by mouth three (3) times dailyIndications: ATTENTION-DEFICIT HYPERACTIVITY DISORDER, inattention. Max Daily Amount: 3 Tabs  
  
 lisinopril 20 mg tablet Commonly known as:  PRINIVIL, ZESTRIL  
TAKE 1 TABLET BY MOUTH TWO TIMES A DAY 90 day supply  Indications: hypertension  
  
 methylPREDNISolone 4 mg tablet Commonly known as:  Marily Little Per dose pack instructions NexIUM 40 mg capsule Generic drug:  esomeprazole Take 40 mg by mouth daily. * oxyCODONE ER 10 mg ER tablet Commonly known as:  OxyCONTIN Take 5 mg by mouth every twelve (12) hours. * oxyCODONE IR 10 mg Tab immediate release tablet Commonly known as:  Nereida Magyar Take 0.5-1 Tabs by mouth four (4) times daily as needed for up to 30 days. Max Daily Amount: 40 mg. Indications: Pain PRIVIGEN 10 % infusion Generic drug:  immune globulin 10% (human) 40 g by IntraVENous route every thirty (30) days. RESTASIS 0.05 % ophthalmic emulsion Generic drug:  cycloSPORINE Administer 1 Drop to both eyes two (2) times a day. ZOLMitriptan 5 mg tablet Commonly known as:  ZOMIG  
TAKE 1 TABLET BY MOUTH AS NEEDED FOR MIGRAINE FOR UP TO 90 DAYS  Indications: MIGRAINE  
  
 * Notice: This list has 2 medication(s) that are the same as other medications prescribed for you. Read the directions carefully, and ask your doctor or other care provider to review them with you. Prescriptions Sent to Pharmacy Refills  
 methylPREDNISolone (MEDROL DOSEPACK) 4 mg tablet 0 Sig: Per dose pack instructions Class: Normal  
 Pharmacy: [facility] [address] Ph #: [phone] We Performed the Following AMB POC XRAY, SPINE, LUMBOSACRAL; 4+ [id number] CPT(R)] Follow-up Instructions Return for with Dr. Sarah Leal, MRI/CT f/u. To-Do List   
 09/25/2017 Imaging:  CT SPINE LUMB W CONT Referral Information Referral ID Referred By Referred To  
  
 [id number] Mani Griffith Not Available Visits Status Start Date End Date 1 New Request 9/25/17 9/25/18 If your referral has a status of pending review or denied, additional information will be sent to support the outcome of this decision. Patient Instructions Chronic Pain: Care Instructions Your Care Instructions Chronic pain is pain that lasts a long time (months or even years) and may or may not have a clear cause. It is different from acute pain, which usually does have a clear causelike an injury or illnessand gets better over time. Chronic pain: 
· Lasts over time but may vary from day to day. · Does not go away despite efforts to end it. · May disrupt your sleep and lead to fatigue. · May cause depression or anxiety. · May make your muscles tense, causing more pain. · Can disrupt your work, hobbies, home life, and relationships with friends and family. Chronic pain is a very real condition. It is not just in your head. Treatment can help and usually includes several methods used together, such as medicines, physical therapy, exercise, and other treatments. Learning how to relax and changing negative thought patterns can also help you cope. Chronic pain is complex. Taking an active role in your treatment will help you better manage your pain. Tell your doctor if you have trouble dealing with your pain. You may have to try several things before you find what works best for you. Follow-up care is a key part of your treatment and safety. Be sure to make and go to all appointments, and call your doctor if you are having problems. Its also a good idea to know your test results and keep a list of the medicines you take. How can you care for yourself at home? · Pace yourself. Break up large jobs into smaller tasks.  Save harder tasks for days when you have less pain, or go back and forth between hard tasks and easier ones. Take rest breaks. · Relax, and reduce stress. Relaxation techniques such as deep breathing or meditation can help. · Keep moving. Gentle, daily exercise can help reduce pain over the long run. Try low- or no-impact exercises such as walking, swimming, and stationary biking. Do stretches to stay flexible. · Try heat, cold packs, and massage. · Get enough sleep. Chronic pain can make you tired and drain your energy. Talk with your doctor if you have trouble sleeping because of pain. · Think positive. Your thoughts can affect your pain level. Do things that you enjoy to distract yourself when you have pain instead of focusing on the pain. See a movie, read a book, listen to music, or spend time with a friend. · If you think you are depressed, talk to your doctor about treatment. · Keep a daily pain diary. Record how your moods, thoughts, sleep patterns, activities, and medicine affect your pain. You may find that your pain is worse during or after certain activities or when you are feeling a certain emotion. Having a record of your pain can help you and your doctor find the best ways to treat your pain. · Take pain medicines exactly as directed. ¨ If the doctor gave you a prescription medicine for pain, take it as prescribed. ¨ If you are not taking a prescription pain medicine, ask your doctor if you can take an over-the-counter medicine. Reducing constipation caused by pain medicine · Include fruits, vegetables, beans, and whole grains in your diet each day. These foods are high in fiber. · Drink plenty of fluids, enough so that your urine is light yellow or clear like water. If you have kidney, heart, or liver disease and have to limit fluids, talk with your doctor before you increase the amount of fluids you drink. · If your doctor recommends it, get more exercise. Walking is a good choice. Bit by bit, increase the amount you walk every day.  Try for at least 30 minutes on most days of the week. · Schedule time each day for a bowel movement. A daily routine may help. Take your time and do not strain when having a bowel movement. When should you call for help? Call your doctor now or seek immediate medical care if: 
· Your pain gets worse or is out of control. · You feel down or blue, or you do not enjoy things like you once did. You may be depressed, which is common in people with chronic pain. Depression can be treated. · You have vomiting or cramps for more than 2 hours. Watch closely for changes in your health, and be sure to contact your doctor if: 
· You cannot sleep because of pain. · You are very worried or anxious about your pain. · You have trouble taking your pain medicine. · You have any concerns about your pain medicine. · You have trouble with bowel movements, such as: 
¨ No bowel movement in 3 days. ¨ Blood in the anal area, in your stool, or on the toilet paper. ¨ Diarrhea for more than 24 hours. Where can you learn more? Go to http://zoe-jocelyne.info/. Enter N004 in the search box to learn more about \"Chronic Pain: Care Instructions. \" Current as of: October 14, 2016 Content Version: 11.3 © 9971-5033 [employer]. Care instructions adapted under license by [employer] (which disclaims liability or warranty for this information). If you have questions about a medical condition or this instruction, always ask your healthcare professional. [place] [address] any warranty or liability for your use of this information. Introducing hospitals & HEALTH SERVICES! Dear Nathaly Mcmillan: 
Thank you for requesting a [employer] account. Our records indicate that you already have an active [employer] account. You can access your account anytime at [email or web address] Did you know that you can access your hospital and ER discharge instructions at any time in [employer]? You can also review all of your test results from your hospital stay or ER visit. Additional Information If you have questions, please visit the Frequently Asked Questions section of the [employer] website at [email or web address] Remember, [employer] is NOT to be used for urgent needs. For medical emergencies, dial 911. Now available from your iPhone and Android! Please provide this summary of care documentation to your next provider. Your primary care clinician is listed as Damien Odonnell. If you have any questions after today's visit, please call [phone].

## 2017-09-25 NOTE — PROGRESS NOTES
Jhoan Carmona [facility] [address]  [address], [address]  [place], [facility]  Phone: [phone]  Fax: [phone]        Milton Ann  : 1950  PCP: Cheryal Sandifer, MD    PROGRESS NOTE      ASSESSMENT AND PLAN    Diagnoses and all orders for this visit:    1. Pain from implanted hardware, initial encounter  -     [70509] LS Spine 4V    1. Advised to continue HEP. 2. Continue medications through [facility].   3. Lumbar spine CT. 4. F/u with Dr. Pratibha Stephenson after CT for re-eval of hardware. 5. Rx for MDP. 6. Given information on chronic pain. Follow-up Disposition:  Return for with Dr. Pratibha Stephenson, MRI/CT f/u. HISTORY OF PRESENT ILLNESS  Jocelyn Ray is a 73 y.o. female. Pt was last seen in the office by Dr. Pratibha Stephenson in 2016. She is s/p L5-S1 fusion in  and is in Pain Management at [facility].     She reports intermittent fevers since her back sx. Has been seen by ID. Had Indium scan, no source of infection noted. Patient feels that her hardware is causing her pain and fevers. Pt states that since her latest lung infection, she has been having recurrent headaches. . Today pt c/o diffuse pain and sensitivity of her back, hips, and buttocks. She notes that she has numbness in her back as well. She had a colorectal exam in August which caused her pain to increase. Since this time she started to have BLE pain and paresthesias. Pt reports pain does radiate into BLE, R>L. Pt reports burning pain in her bilateral buttocks. She also has numbness in her BLE. She is unable to tolerate her LE pain when it begins to flare up. She has soreness in her calves as well. Pt denies saddle paresthesias. Pt states she has been using Motrin and Tylenol without much relief. She is on Roxicodone from [facility]. She uses 6 Flector patches a day. She has GI issues with taking antineuritics. Denies persistent chills, weight changes, neurogenic bowel or bladder symptoms.  Reports that she has been home bound due to all her pain. Pt has had frequent lung infections. Allergies to > 25 medications. Pain Assessment  9/25/2017   Location of Pain Back   Pain Location Comment -   Location Modifiers -   Severity of Pain 5   Quality of Pain Aching   Quality of Pain Comment numbness, tingling   Duration of Pain -   Frequency of Pain Constant   Date Pain First Started -   Aggravating Factors Walking;Stairs;Standing;Straightening;Bending   Limiting Behavior -   Relieving Factors Nothing   Relieving Factors Comment -   Result of Injury -             CT Results (most recent):    Results from Hospital Encounter encounter on 08/11/17   CT CHEST ABD PELV W CONT   Narrative PROCEDURE:  CT Chest, Abdomen, Pelvis without Contrast.    INDICATION:  Sharp recurrent pneumonia, abdominal pain and nausea with vomiting,  fever. History of back surgery 2 years ago. Prior colon surgery. COMPARISON:  3/17/16 (CT abdomen-pelvis), 9/9/15 (CT chest). TECHNIQUE:  Helical volumetric CT imaging of the chest, abdomen and pelvis is  performed at 5 mm axial sections without IV contrast administration. Coronal  and sagittal multiplanar reconstruction images are generated for improved  anatomic delineation.    - Radiation dose:   mGy-cm.  - All CT scans at this facility are performed using dose optimization technique  as appropriate to the performed exam, to include automated exposure control,  adjustment of the mA and/or kV according to patient's size (Including  appropriate matching for site-specific examinations), or use of iterative  reconstruction technique. FINDINGS:    CHEST    A group of tightly clustered calcifications is identified in the right middle  lobe, adjacent to minor fissure (axial #24-25, sagittal #22). In the right costophrenic angle region, there is a pleura-contacting small  nodular density measuring 0.4 x 0.3 cm (axial #39). This nodule appears  unchanged compared to 3/17/16.     In the right middle lobe anterior aspect, there is a tiny circumscribed ovoid  lung nodule measuring about 0.2 cm (axial #33, coronal #20, sagittal #23). This  nodule was not apparent on the previous studies. In the left lung base region, a tiny 0.2 cm nodule is identified (axial #37). This nodule was not apparent on the previous studies. No infiltrate or consolidation is identified. No significant pleural effusion. No evidence for significant mediastinal or hilar adenopathy is detected. A  small hiatal hernia is observed. No supraclavicular or axillary adenopathy is  identified. ABDOMEN    In the left lobe of the liver, a lobulated small circumscribed hypodensity is  identified measuring 1.1 x 1.0 cm (axial #41), consistent with a hepatic cyst.   The remainder of the liver is unremarkable. Another small hypodensity is seen adjacent to the greater curvature of the  stomach (axial #42), measuring 1.0 x 1.1 cm. Gallbladder is absent by surgical removal.  Spleen, adrenal glands, pancreas and  kidneys are unremarkable. Stomach appears unremarkable. No acute small bowel abnormality is detected. The appendix is not visualized. No acute findings are detected at the expected  location for the appendix. No acute diverticulitis or acute colitis is  appreciated. No mesenteric or retroperitoneal adenopathy is detected. Mild atheromatous  plaque calcifications are observed. No acute aortic aneurysm. PELVIS    The urinary bladder is partially contracted and appears grossly unremarkable. The uterus is absent breast surgical removal.  No adnexal mass is detected. The  rectum appears grossly unremarkable. No pelvic adenopathy is appreciated. No  free fluid. No osteoblastic or osteolytic lesions are identified in the skeletal structure. Mild anterior wedging is identified at T6. The T12 demonstrates mild anterior  wedging, with endplate sclerosis and pronounced decreased disc height at T12-L1.   Grade 2 spondylolisthesis is noted at L5-S1 with posterior interbody fusion and  disc spacer placement. Impression IMPRESSION:    1. Tiny lung nodules. Recommend follow-up CT in 6 months. 2.  Hepatic cyst in the left lobe. 3.  Small cystic structure adjacent to the greater curvature of the stomach. The source for this cystic structure is unclear. This cystic structure was not  present on the previous CT. 4.  No acute findings along the gastrointestinal tract. 5.  Mild anterior wedging at T6, unchanged. T12 anterior wedging with  associated pronounced degenerative changes at T12-L1, also unchanged. 6.  Postsurgical changes at L5-S1. Nuclear Medicine Results (most recent):    Results from Hospital Encounter encounter on 08/16/16   NM BONE MARROW [facility] BODY   Narrative 99M-TECHNETIUM SULFUR COLLOID BONE MARROW SCAN, WHOLE BODY,   AND 111INDIUM WHITE BLOOD CELL SCAN, WHOLE BODY FOR INFECTION LOCALIZATION    INDICATION: Fever of unknown origin. Multiple episodes of odontogenic infection  since January, Clinical question of osteomyelitis/septic arthritis versus other  site of active infection. COMPARISON: None. DESCRIPTION: Following IV administration of 594 microcuries 111Indium labeled  autologous white blood cells into the left antecubital fossa IV , approximately  24 hour delayed planar images of the whole body were obtained. Following IV administration of 11 mCi 99m Technetium sulfur colloid into the  left antecubital fossa , planar imaging of the whole body was also performed 30  minutes postinjection, simultaneously with the white cell images in  superimposable fashion using dual isotope imaging technique. 99m Technetium Sulfur Colloid Images: Normal distribution of activity throughout  the bone marrow as well as the liver and spleen. Small injection site  demonstrated at the left antecubital fossa. .    111Indium White Blood Cell Images: Normal distribution of activity throughout  the bone marrow as well as the liver and spleen. .         Impression IMPRESSION:     No evidence of acute infection. PAST MEDICAL HISTORY   Past Medical History:   Diagnosis Date    ADD (attention deficit disorder)     Arthritis     Chronic low back pain     Cold hands and feet     Depression     ADD    Esophageal reflux     Fall at home     Fatigue     Fibromyalgia     GERD (gastroesophageal reflux disease)     H/O dehydration     Headache     Hiatal hernia 2004    Hyperlipidemia     Hypertension     Hypoglycemia     IgG deficiency (HCC)     Lumbago     Migraine     Nausea & vomiting     Pain in joint, pelvic region and thigh     Painful sex     sometimes    Poor appetite 2001    S/P lumbar fusion 11/17/2015    1. Bilateral L5-S1 laminotomy, medial facetectomy, foraminotomy. 2. L5-S1 transforaminal lumbar interbody fusion with PEEK cage and demineralized bone matrix. 3. Posterolateral fusion L5-S1. 4. Segmental spinal instrumentation, L5-S1, DePuy Expedium type. 11/16/2015 by Dr. Elana Reed     Sinus infection     SOB (shortness of breath)     Spinal stenosis, lumbar region, without neurogenic claudication 10/19/2015    Strep throat     Swallowing difficulty     Swelling     legs and feet     Thoracic or lumbosacral neuritis or radiculitis, unspecified     Thyroid disease     Thyroid disease     Tortuous colon        Past Surgical History:   Procedure Laterality Date    HX BREAST AUGMENTATION      rejected and subsequently removed    HX CHOLECYSTECTOMY      HX HEENT  4/2014    FACIAL MUSCLE SX-ON TEMPLE-due to muscle spasm    HX HYSTERECTOMY      HX LUMBAR FUSION  11-16-15    L5/S1 Laminectomy Fusion/TLIF    HX ORTHOPAEDIC  11/16/15    back surgery    HX OTHER SURGICAL      tooth extraction on 6/16 and root canal on 6/20     HX PELVIC LAPAROSCOPY     .       MEDICATIONS    Current Outpatient Prescriptions   Medication Sig Dispense Refill    dextroamphetamine-amphetamine (ADDERALL) 30 mg tablet Take 1 Tab by mouth three (3) times dailyIndications: ATTENTION-DEFICIT HYPERACTIVITY DISORDER, inattention. Max Daily Amount: 3 Tabs 90 Tab 0    oxyCODONE ER (OXYCONTIN) 10 mg ER tablet Take 5 mg by mouth every twelve (12) hours.  esomeprazole (NEXIUM) 40 mg capsule Take 40 mg by mouth daily.  sucralfate (CARAFATE) 1 gram tablet Take 1 g by mouth four (4) times daily as needed.  thyroid, Pork, (ARMOUR THYROID) 30 mg tablet Take 45 mg by mouth daily.  ZOLMitriptan (ZOMIG) 5 mg tablet TAKE 1 TABLET BY MOUTH AS NEEDED FOR MIGRAINE FOR UP TO 90 DAYS  Indications: MIGRAINE 54 Tab 3    lisinopril (PRINIVIL, ZESTRIL) 20 mg tablet TAKE 1 TABLET BY MOUTH TWO TIMES A DAY 90 day supply  Indications: hypertension (Patient taking differently: Take 20 mg by mouth nightly. TAKE 1 TABLET BY MOUTH TWO TIMES A DAY 90 day supply  Indications: hypertension) 180 Tab 3    cycloSPORINE (RESTASIS) 0.05 % ophthalmic emulsion Administer 1 Drop to both eyes two (2) times a day.  acetaminophen (TYLENOL) 500 mg tablet Take 1,000 mg by mouth every six (6) hours as needed.  immune globulin 10%, human, (PRIVIGEN) 10 % infusion 40 g by IntraVENous route every thirty (30) days.  cyanocobalamin (VITAMIN B12) 1,000 mcg/mL injection 1,000 mcg by IntraMUSCular route every thirty (30) days.  oxyCODONE IR (ROXICODONE) 10 mg tab immediate release tablet Take 0.5-1 Tabs by mouth four (4) times daily as needed for up to 30 days. Max Daily Amount: 40 mg.  Indications: Pain 120 Tab 0        ALLERGIES  Allergies   Allergen Reactions    Atorvastatin Other (comments)     ALL CHOLESTROL MEDS    Avelox [Moxifloxacin] Other (comments)     Other reaction(s): other/intolerance  Seizures  Other reaction(s): Unknown Reaction  Other reaction(s): other/intolerance  Seizures  Nerve pain      Azithromycin Rash and Other (comments)     Other reaction(s): unknown  Pt states is not allergic to this med  Other reaction(s): unknown  Pt states is not allergic to this med  Acute toxic reaction    Bactrim [Sulfamethoprim Ds] Other (comments)     Severe abdominal pain    Bupropion Other (comments)     Other reaction(s): Muscle Pain  Other reaction(s): other/intolerance  Muscle pains  All antidepressants. Muscle pains  All antidepressants.  Cefuroxime Axetil Unknown (comments)    Ciprofloxacin Other (comments)     Other reaction(s): other/intolerance  Lowers bp  Other reaction(s): Unknown Reaction  Other reaction(s): other/intolerance  Lowers bp  AMS    Diazepam Other (comments)    Duloxetine Other (comments)     shocks in brain  Other reaction(s): other/intolerance  \"shocks in brain\"  \"shocks in brain\"    Focalin [Dexmethylphenidate] Other (comments)     Other reaction(s): Unknown Reaction  Pt denies any allergic    Keflex [Cephalexin] Other (comments)     Other reaction(s): Abdominal pain  Other reaction(s): gi distress  Abdominal pain      Macrobid [Nitrofurantoin Monohyd/M-Cryst] Swelling     Other reaction(s): other/intolerance  Swelling of colon  Other reaction(s): other/intolerance  Swelling of colon    Miralax [Polyethylene Glycol 3350] Unknown (comments)    Nitrofurantoin Macrocrystalline Other (comments)     Swelling of colon    Other Medication Other (comments) and Swelling     Other reaction(s): Unknown Reaction  colon  Dissolving sutures    Oxymorphone Other (comments)    Peridex [Chlorhexidine Gluconate] Swelling     numbness    Phenergan [Promethazine] Other (comments)     Other reaction(s): neurological reaction  \" i see things\"  Other reaction(s): Unknown Reaction  Other reaction(s): neurological reaction  \" i see things\"  hullucinations    Statins-Hmg-Coa Reductase Inhibitors Other (comments)    Sucralfate Myalgia     Other reaction(s): muscle/joint pain  patient denies.   Patient able to tolerate now     Sulfa (Sulfonamide Antibiotics) Hives    Sulfamethoxazole-Trimethoprim Other (comments)    Sulfate Salt Unknown (comments)    Venlafaxine Itching     Other reaction(s): mild rash/itching    Yeast Other (comments)    Yeast, Dried Other (comments) and Hives     Patient reported extreme lethargy, bloating/abdominal pain, and digestive problems when consumes yeast or foods containing yeast          SOCIAL HISTORY    Social History     Social History    Marital status:      Spouse name: N/A    Number of children: N/A    Years of education: N/A     Occupational History    Not on file. Social History Main Topics    Smoking status: Former Smoker    Smokeless tobacco: Never Used    Alcohol use No    Drug use: No    Sexual activity: Yes     Birth control/ protection: Surgical      Comment: Hysterectomy     Other Topics Concern    Not on file     Social History Narrative       FAMILY HISTORY  Family History   Problem Relation Age of Onset    Hypertension Mother     Cancer Mother      Breast cancer    Heart Disease Father        REVIEW OF SYSTEMS  Review of Systems   Constitutional: Positive for fever and malaise/fatigue. Negative for chills and weight loss. Respiratory: Negative for shortness of breath. Cardiovascular: Negative for chest pain. Gastrointestinal: Positive for abdominal pain. Negative for constipation. Negative for fecal incontinence   Genitourinary: Negative for dysuria. Negative for urinary incontinence   Musculoskeletal: Positive for back pain, joint pain and myalgias. Per HPI   Skin: Negative for rash. Neurological: Positive for tremors, sensory change and headaches. Negative for dizziness, tingling and focal weakness. Endo/Heme/Allergies: Does not bruise/bleed easily. Psychiatric/Behavioral: The patient does not have insomnia. PHYSICAL EXAMINATION  There were no vitals taken for this visit. Accompanied by spouse.       Constitutional:  Well developed, well nourished, in no acute distress. Psychiatric: Affect and mood are appropriate. Integumentary: No rashes or abrasions noted on exposed areas. Cardiovascular/Peripheral Vascular: Intact l pulses. No peripheral edema is noted. Lymphatic:  No evidence of lymphedema. No cervical lymphadenopathy. SPINE/MUSCULOSKELETAL EXAM    Lumbar spine:  No rash, ecchymosis, or gross obliquity. No fasciculations. No focal atrophy is noted. No tenderness to palpation at the sciatic notch. SI joints non-tender. Trochanters non tender. Sensation grossly intact to light touch. MOTOR:       Hip Flex  Quads Hamstrings Ankle DF EHL Ankle PF   Right +4/5 +4/5 +4/5 +4/5 +4/5 +4/5   Left +4/5 +4/5 +4/5 +4/5 +4/5 +4/5       Straight Leg raise negative. Ambulation without assistive device. FWB. RADIOGRAPHS  Lumbar spine xray films reviewed, preliminary read:  1) Scoliosis. 2) Intact hardware. Written by Donte Adhikari, as dictated by Dori Choe MD.    I, Dr. Dori Choe MD, confirm that all documentation is accurate. Ms. Bakari Caban may have a reminder for a \"due or due soon\" health maintenance. I have asked that she contact her primary care provider for follow-up on this health maintenance.

## 2017-09-25 NOTE — PROGRESS NOTES
Verbal order entered per Dr. Alisa Voss as documented on blue sheet:   -MRI L-spine with/IV contrast, painful hardware and recurrent fevers  -MDP

## 2017-09-26 ENCOUNTER — TELEPHONE (OUTPATIENT)
Dept: PAIN MANAGEMENT | Age: 67
End: 2017-09-26

## 2017-09-26 NOTE — TELEPHONE ENCOUNTER
The pt was called to be made aware that the provider will not be in the office for her appt on 09/29/17. She was not able to be reached. A message was left for the pt and she was asked to call the office back at her earliest convenience. The number to the office was provided for the pt. A  was obtained and the last fill date for her medication was 09/01/17.

## 2017-09-27 ENCOUNTER — TELEPHONE (OUTPATIENT)
Dept: ORTHOPEDIC SURGERY | Age: 67
End: 2017-09-27

## 2017-09-27 NOTE — TELEPHONE ENCOUNTER
Pt's  Valerio Finney (ok per [employer]IP[employer]) called and is requesting a call back. Valerio Finney stated he has some questions about the new medication Dr. Simon Graham prescribed pt on Monday at her last visit. Valerio Finney states that on Monday the medication seemed to help pt but since then it has either been the same as before the medication or worse as far as pain goes. Valerio Finney can be reached at [phone].

## 2017-09-28 NOTE — TELEPHONE ENCOUNTER
Patient's  Fidelina Dumas is calling again regarding this. He is requesting a call back today. Please advise Fidelina Dumas at [phone].

## 2017-09-29 ENCOUNTER — DOCUMENTATION ONLY (OUTPATIENT)
Dept: ORTHOPEDIC SURGERY | Age: 67
End: 2017-09-29

## 2017-09-29 DIAGNOSIS — M79.2 NEURITIS: Primary | ICD-10-CM

## 2017-09-29 RX ORDER — GABAPENTIN 300 MG/1
CAPSULE ORAL
Qty: 90 CAP | Refills: 1 | OUTPATIENT
Start: 2017-09-29 | End: 2018-08-22

## 2017-09-29 RX ORDER — GABAPENTIN 300 MG/1
CAPSULE ORAL
Qty: 90 CAP | Refills: 1 | OUTPATIENT
Start: 2017-09-29 | End: 2017-10-10

## 2017-09-29 NOTE — TELEPHONE ENCOUNTER
I called the patients  Martina Mendez and realized as I was on the phone that this message had been taken care of by another nurse.

## 2017-09-29 NOTE — PROGRESS NOTES
Sena in the [facility] called, stating pt went to pharmacy and Gabapentin wasn't there, I have sent it twice now.  Please make sure the pharmacy gets it

## 2017-10-10 ENCOUNTER — OFFICE VISIT (OUTPATIENT)
Dept: PAIN MANAGEMENT | Age: 67
End: 2017-10-10

## 2017-10-10 VITALS
HEIGHT: 60 IN | RESPIRATION RATE: 10 BRPM | WEIGHT: 142 LBS | DIASTOLIC BLOOD PRESSURE: 94 MMHG | TEMPERATURE: 97.3 F | HEART RATE: 92 BPM | BODY MASS INDEX: 27.88 KG/M2 | SYSTOLIC BLOOD PRESSURE: 135 MMHG

## 2017-10-10 DIAGNOSIS — M54.40 CHRONIC MIDLINE LOW BACK PAIN WITH SCIATICA, SCIATICA LATERALITY UNSPECIFIED: ICD-10-CM

## 2017-10-10 DIAGNOSIS — R10.84 GENERALIZED ABDOMINAL PAIN: ICD-10-CM

## 2017-10-10 DIAGNOSIS — G43.119 INTRACTABLE MIGRAINE WITH AURA WITHOUT STATUS MIGRAINOSUS: ICD-10-CM

## 2017-10-10 DIAGNOSIS — Z79.899 ENCOUNTER FOR LONG-TERM CURRENT USE OF HIGH RISK MEDICATION: ICD-10-CM

## 2017-10-10 DIAGNOSIS — M51.37 DEGENERATION OF LUMBAR OR LUMBOSACRAL INTERVERTEBRAL DISC: ICD-10-CM

## 2017-10-10 DIAGNOSIS — G89.29 CHRONIC LOW BACK PAIN WITHOUT SCIATICA, UNSPECIFIED BACK PAIN LATERALITY: ICD-10-CM

## 2017-10-10 DIAGNOSIS — M25.559 PAIN IN JOINT INVOLVING PELVIC REGION AND THIGH, UNSPECIFIED LATERALITY: ICD-10-CM

## 2017-10-10 DIAGNOSIS — M47.817 LUMBOSACRAL SPONDYLOSIS WITHOUT MYELOPATHY: ICD-10-CM

## 2017-10-10 DIAGNOSIS — M54.50 CHRONIC LOW BACK PAIN WITHOUT SCIATICA, UNSPECIFIED BACK PAIN LATERALITY: ICD-10-CM

## 2017-10-10 DIAGNOSIS — G89.29 CHRONIC MIDLINE LOW BACK PAIN WITH SCIATICA, SCIATICA LATERALITY UNSPECIFIED: ICD-10-CM

## 2017-10-10 DIAGNOSIS — M47.816 LUMBAR FACET ARTHROPATHY: ICD-10-CM

## 2017-10-10 DIAGNOSIS — E53.8 VITAMIN B 12 DEFICIENCY: ICD-10-CM

## 2017-10-10 DIAGNOSIS — M76.891 ENTHESOPATHY OF HIP REGION ON BOTH SIDES: ICD-10-CM

## 2017-10-10 DIAGNOSIS — M43.17 SPONDYLOLISTHESIS AT L5-S1 LEVEL: ICD-10-CM

## 2017-10-10 DIAGNOSIS — Z79.899 ENCOUNTER FOR LONG-TERM (CURRENT) USE OF HIGH-RISK MEDICATION: ICD-10-CM

## 2017-10-10 DIAGNOSIS — F51.04 CHRONIC INSOMNIA: ICD-10-CM

## 2017-10-10 DIAGNOSIS — M47.812 CERVICAL FACET SYNDROME: ICD-10-CM

## 2017-10-10 DIAGNOSIS — M48.061 SPINAL STENOSIS, LUMBAR REGION, WITHOUT NEUROGENIC CLAUDICATION: ICD-10-CM

## 2017-10-10 DIAGNOSIS — E07.9 THYROID DISEASE: ICD-10-CM

## 2017-10-10 DIAGNOSIS — G43.719 INTRACTABLE CHRONIC MIGRAINE WITHOUT AURA AND WITHOUT STATUS MIGRAINOSUS: ICD-10-CM

## 2017-10-10 DIAGNOSIS — M54.16 LUMBAR NEURITIS: ICD-10-CM

## 2017-10-10 DIAGNOSIS — G24.3 SPASMODIC TORTICOLLIS: ICD-10-CM

## 2017-10-10 DIAGNOSIS — T85.848A PAIN FROM IMPLANTED HARDWARE, INITIAL ENCOUNTER: Primary | ICD-10-CM

## 2017-10-10 DIAGNOSIS — F45.42 PAIN DISORDER WITH PSYCHOLOGICAL FACTORS: ICD-10-CM

## 2017-10-10 DIAGNOSIS — M96.1 LUMBAR POST-LAMINECTOMY SYNDROME: ICD-10-CM

## 2017-10-10 DIAGNOSIS — M79.2 NEURITIS: ICD-10-CM

## 2017-10-10 DIAGNOSIS — M76.892 ENTHESOPATHY OF HIP REGION ON BOTH SIDES: ICD-10-CM

## 2017-10-10 RX ORDER — DEXTROAMPHETAMINE SACCHARATE, AMPHETAMINE ASPARTATE, DEXTROAMPHETAMINE SULFATE AND AMPHETAMINE SULFATE 7.5; 7.5; 7.5; 7.5 MG/1; MG/1; MG/1; MG/1
30 TABLET ORAL 3 TIMES DAILY
Qty: 90 TAB | Refills: 0 | Status: SHIPPED | OUTPATIENT
Start: 2017-10-10 | End: 2017-10-10 | Stop reason: SDUPTHER

## 2017-10-10 RX ORDER — DEXTROAMPHETAMINE SACCHARATE, AMPHETAMINE ASPARTATE, DEXTROAMPHETAMINE SULFATE AND AMPHETAMINE SULFATE 7.5; 7.5; 7.5; 7.5 MG/1; MG/1; MG/1; MG/1
30 TABLET ORAL
COMMUNITY
End: 2017-10-16 | Stop reason: SDUPTHER

## 2017-10-10 RX ORDER — DEXTROAMPHETAMINE SACCHARATE, AMPHETAMINE ASPARTATE, DEXTROAMPHETAMINE SULFATE AND AMPHETAMINE SULFATE 7.5; 7.5; 7.5; 7.5 MG/1; MG/1; MG/1; MG/1
30 TABLET ORAL 3 TIMES DAILY
Qty: 90 TAB | Refills: 0 | Status: SHIPPED | OUTPATIENT
Start: 2017-11-08 | End: 2017-12-08

## 2017-10-10 RX ORDER — CYANOCOBALAMIN 1000 UG/ML
1000 INJECTION, SOLUTION INTRAMUSCULAR; SUBCUTANEOUS ONCE
Qty: 1 ML | Refills: 0
Start: 2017-10-10 | End: 2017-10-10

## 2017-10-10 RX ORDER — OXYCODONE HYDROCHLORIDE 10 MG/1
5-10 TABLET ORAL
Qty: 120 TAB | Refills: 0 | Status: SHIPPED | OUTPATIENT
Start: 2017-10-10 | End: 2017-10-10 | Stop reason: SDUPTHER

## 2017-10-10 RX ORDER — OXYCODONE HYDROCHLORIDE 10 MG/1
5-10 TABLET ORAL
Qty: 120 TAB | Refills: 0 | Status: SHIPPED | OUTPATIENT
Start: 2017-11-08 | End: 2017-10-16 | Stop reason: ALTCHOICE

## 2017-10-10 NOTE — PATIENT INSTRUCTIONS
Plan:  Continue same medications as prescribed for chronic pain  Vitamin B12 injection today  Follow up in 2 months or sooner if needed  Regular exercise and attention to emotional health and diet remain the most effective ways to treat chronic pain of all kinds  You may contact me with questions or concerns through [employer]  We will help you transition care to Dr. Jaleel Castro at his new practice, with Dr. Bryan Rueda in [address] [place] [address]  Phone: [phone]  Fax: [phone]

## 2017-10-10 NOTE — PROGRESS NOTES
Nursing Notes    Patient presents to the office today in follow-up. Patient rates her pain at 3/10 on the numerical pain scale. Reviewed medications with counts as follows:    Rx Date filled Qty Dispensed Pill Count Last Dose Short   adderall 30mg   09/02/17 90 0 today no   Oxycodone 10mg 09/02/17 120 68 today no                                  Comments:     POC UDS was not performed in office today    Any new labs or imaging since last appointment? YES    Have you been to an emergency room (ER) or urgent care clinic since your last visit? NO            Have you been hospitalized since your last visit? NO     If yes, where, when, and reason for visit? Have you seen or consulted any other health care providers outside of the [address] [place] [address]  since your last visit? YES   If yes, where, when, and reason for visit? HM deferred to pcp.

## 2017-10-10 NOTE — PROGRESS NOTES
HISTORY OF PRESENT ILLNESS  Denisse Ray is a 73 y.o. female. she returns for follow-up of chronic, severe pain which is widespread and multifactorial. She remains a highly complex patient. She reports that since she was started on Gabapentin 300 mg by Dr. Lillie Hughes for treatment of fever of unknown origin as well as neuropathic leg pain. This has been highly effective for her fevers, and she has only suffered from one fever episode in the past month, which is highly encouraging for her. She still suffers with constant widespread pain predominating in the lower back and legs, which has been much worse since undergoing fusion surgery. Medications continue to work well for pain control overall. Berenice Costa A Day is tolerating medications well, with no untoward side effects noted. She is able to stay more active with less discomfort with these current doses. The patient reports an average of 60% relief with this regimen. Most recent UDS and  were consistent with prescribed medications. Pill counts are appropriate. She is informed of side effects, risks, and benefits of this regimen, and emphasizes that she derives a significant improvement in functionality and quality of life, and notes that non-opioid medications and therapies in the past have not offered significant benefit. We also discussed the departure of Dr. Donato Ferguson and myself from the practice and discussed at GREAT length \"next steps\". She chooses to stay to follow Dr. Donato Ferguson to his new practice and requests that we perform this referral request.    She denies new or worsening insomnia or constipation issues. She denies any falls, injuries, or hospitalizations since the last visit. A total of 55 minutes was spent with the patient of which more than 50% of the time was spent counseling the patient. HPI--see above    ROS  Constitutional: Positive for malaise/fatigue. Gastrointestinal: Positive for constipation.    Neurological: Positive for sensory change (intermittent numbness right hand after use) and weakness (generalized). Psychiatric/Behavioral: The patient has insomnia. All other systems reviewed and are negative. Physical Exam  Constitutional: She is oriented to person, place, and time. She appears distressed. HENT:   Head: Normocephalic and atraumatic. Right Ear: External ear normal.   Left Ear: External ear normal.   Nose: Nose normal.   Mouth/Throat: Oropharynx is clear and moist. No oropharyngeal exudate. Eyes: Conjunctivae and EOM are normal. Pupils are equal, round, and reactive to light. Right eye exhibits no discharge. Left eye exhibits no discharge. No scleral icterus. Neck: Normal range of motion. Neck supple. No thyromegaly present. Cardiovascular: Normal rate, regular rhythm and normal heart sounds. Pulmonary/Chest: Effort normal and breath sounds normal. No respiratory distress. She has no wheezes. She has no rales. She exhibits no tenderness. Abdominal: Soft. She exhibits no distension. There is no tenderness. There is no rebound and no guarding. Musculoskeletal: Normal range of motion. Neurological: She is alert and oriented to person, place, and time. She has normal reflexes. No cranial nerve deficit. She exhibits normal muscle tone. Coordination normal.   Skin: Skin is warm and dry. No rash noted. Psychiatric: She has a normal mood and affect. Her behavior is normal. Judgment and thought content normal.   ASSESSMENT and PLAN    ICD-10-CM ICD-9-CM    1. Pain from implanted hardware, initial encounter T85.848A 996.70      338.18    2. Intractable chronic migraine without aura and without status migrainosus G43.719 346.71    3. Neuritis M79.2 729.2    4. Spasmodic torticollis G24.3 333.83    5. Lumbar post-laminectomy syndrome M96.1 722.83    6. Generalized abdominal pain R10.84 789.07    7. Cervical facet syndrome M12.88 723.8    8. Lumbar facet arthropathy M12.88 721.3    9.  Spondylolisthesis at L5-S1 level M43.17 756.12    10. Spinal stenosis, lumbar region, without neurogenic claudication M48.061 724.02    11. Enthesopathy of hip region on both sides M76.891 726.5     M76.892     12. Lumbosacral spondylosis without myelopathy M47.817 721.3    13. Degeneration of lumbar or lumbosacral intervertebral disc M51.37 722.52    14. Intractable migraine with aura without status migrainosus G43.119 346.01    15. Chronic insomnia F51.04 780.52    16. Encounter for long-term (current) use of high-risk medication Z79.899 V58.69    17. Pain in joint involving pelvic region and thigh, unspecified laterality M25.559 719.45    18. Chronic midline low back pain with sciatica, sciatica laterality unspecified M54.40 724.2     G89.29 724.3      338.29    19. Lumbar neuritis M54.16 724.4 dextroamphetamine-amphetamine (ADDERALL) 30 mg tablet   20. Thyroid disease E07.9 246.9 dextroamphetamine-amphetamine (ADDERALL) 30 mg tablet   21.  Pain disorder with psychological factors F45.41 307.80       Plan:  Continue same medications as prescribed for chronic pain  Vitamin B12 injection today  Follow up in 2 months or sooner if needed  Regular exercise and attention to emotional health and diet remain the most effective ways to treat chronic pain of all kinds  You may contact me with questions or concerns through [employer]  We will help you transition care to Dr. Kimberly Mcintyre at his new practice, with Dr. Farrah Drake in [address] [place] [address]   Phone: [phone]  Fax: [phone]

## 2017-10-13 ENCOUNTER — HOSPITAL ENCOUNTER (OUTPATIENT)
Dept: CT IMAGING | Age: 67
Discharge: HOME | End: 2017-10-13
Attending: PHYSICAL MEDICINE & REHABILITATION
Payer: MEDICARE

## 2017-10-13 DIAGNOSIS — T85.848A PAIN FROM IMPLANTED HARDWARE, INITIAL ENCOUNTER: ICD-10-CM

## 2017-10-13 LAB — CREAT UR-MCNC: 1 MG/DL (ref 0.6–1.3)

## 2017-10-13 PROCEDURE — 82565 ASSAY OF CREATININE: CPT

## 2017-10-13 PROCEDURE — 72132 CT LUMBAR SPINE W/DYE: CPT

## 2017-10-13 PROCEDURE — 74011636320 HC RX REV CODE- 636/320: Performed by: PHYSICAL MEDICINE & REHABILITATION

## 2017-10-13 RX ADMIN — IOPAMIDOL 75 ML: 612 INJECTION, SOLUTION INTRAVENOUS at 07:27

## 2017-10-16 ENCOUNTER — OFFICE VISIT (OUTPATIENT)
Dept: ORTHOPEDIC SURGERY | Age: 67
End: 2017-10-16

## 2017-10-16 VITALS
DIASTOLIC BLOOD PRESSURE: 55 MMHG | WEIGHT: 144.6 LBS | HEIGHT: 60 IN | RESPIRATION RATE: 16 BRPM | TEMPERATURE: 97.7 F | BODY MASS INDEX: 28.39 KG/M2 | HEART RATE: 83 BPM | OXYGEN SATURATION: 96 % | SYSTOLIC BLOOD PRESSURE: 104 MMHG

## 2017-10-16 DIAGNOSIS — G89.4 CHRONIC PAIN SYNDROME: ICD-10-CM

## 2017-10-16 DIAGNOSIS — T85.848D PAIN FROM IMPLANTED HARDWARE, SUBSEQUENT ENCOUNTER: Primary | ICD-10-CM

## 2017-10-16 RX ORDER — LEVOTHYROXINE SODIUM 25 UG/1
50 TABLET ORAL
COMMUNITY

## 2017-10-16 RX ORDER — ESTRADIOL 0.5 MG/1
TABLET ORAL DAILY
COMMUNITY

## 2017-10-16 RX ORDER — PROPRANOLOL HYDROCHLORIDE 20 MG/1
TABLET ORAL 2 TIMES DAILY
COMMUNITY

## 2017-10-16 NOTE — MR AVS SNAPSHOT
Visit Information Date & Time Provider Department Dept. Phone Encounter #  
 10/16/2017 11:45  Alireza Greene MD [address] [facility] [phone] [id number] Follow-up Instructions Return for with Dr. Jelly Mcgrath for possible removal of hardware. Your Appointments 10/24/2017  3:15 PM  
CONSULT with Aretha Galloway MD  
[facility] Appt Note: consult [address] [place] [address]  
[phone]  [place] [phone] Upcoming Health Maintenance Date Due DTaP/Tdap/Td series (1 - Tdap) 10/17/1972 ZOSTER VACCINE AGE 60> 8/17/2011 GLAUCOMA SCREENING Q2Y 10/17/2016 Pneumococcal 65+ High/Highest Risk (1 of 2 - PCV13) 10/17/2016 MEDICARE YEARLY EXAM 10/17/2016 BREAST CANCER SCRN MAMMOGRAM 6/18/2017 INFLUENZA AGE 9 TO ADULT 8/1/2017 COLONOSCOPY 12/8/2026 Allergies as of 10/16/2017  Review Complete On: 10/16/2017 By: Soledad Madsen Severity Noted Reaction Type Reactions Atorvastatin    Other (comments) ALL CHOLESTROL MEDS Avelox [Moxifloxacin]  06/26/2013    Other (comments) Other reaction(s): other/intolerance Seizures Other reaction(s): Unknown Reaction Other reaction(s): other/intolerance Seizures Nerve pain Azithromycin  09/24/2013    Rash, Other (comments) Other reaction(s): unknown Pt states is not allergic to this med Other reaction(s): unknown Pt states is not allergic to this med Acute toxic reaction Bactrim [Sulfamethoprim Ds]  12/20/2013    Other (comments) Severe abdominal pain Bupropion  10/24/2008    Other (comments) Other reaction(s): Muscle Pain Other reaction(s): other/intolerance Muscle pains All antidepressants. Muscle pains All antidepressants. Cefuroxime Axetil  02/24/2016    Unknown (comments) Ciprofloxacin  01/21/2014    Other (comments) Other reaction(s): other/intolerance Lowers bp Other reaction(s): Unknown Reaction Other reaction(s): other/intolerance Lowers bp AMS Diazepam    Other (comments) Duloxetine  10/24/2008    Other (comments)  
 shocks in brain Other reaction(s): other/intolerance \"shocks in brain\" \"shocks in brain\" Focalin [Dexmethylphenidate]  05/14/2014    Other (comments) Other reaction(s): Unknown Reaction Pt denies any allergic Keflex [Cephalexin]  09/12/2014    Other (comments) Other reaction(s): Abdominal pain Other reaction(s): gi distress Abdominal pain Macrobid [Nitrofurantoin Monohyd/m-cryst]  03/21/2014    Swelling Other reaction(s): other/intolerance Swelling of colon Other reaction(s): other/intolerance Swelling of colon Miralax [Polyethylene Glycol 3350]  09/01/2017    Unknown (comments) Nitrofurantoin Macrocrystalline  04/07/2014    Other (comments) Swelling of colon Other Medication    Other (comments), Swelling Other reaction(s): Unknown Reaction 
colon Dissolving sutures Oxymorphone  06/05/2017    Other (comments) Peridex [Chlorhexidine Gluconate]  08/03/2017    Swelling  
 numbness Phenergan [Promethazine]  07/04/2012    Other (comments) Other reaction(s): neurological reaction \" i see things\" Other reaction(s): Unknown Reaction Other reaction(s): neurological reaction \" i see things\" hullucinations Statins-hmg-coa Reductase Inhibitors  02/24/2016    Other (comments) Sucralfate  08/14/2009    Myalgia Other reaction(s): muscle/joint pain 
patient denies. Patient able to tolerate now Sulfa (Sulfonamide Antibiotics)  10/15/2013    Hives Sulfamethoxazole-trimethoprim  05/21/2016    Other (comments) Sulfate Salt    Unknown (comments) Venlafaxine  10/07/2010    Itching Other reaction(s): mild rash/itching Yeast    Other (comments) Yeast, Dried  11/04/2015    Other (comments), Hives Patient reported extreme lethargy, bloating/abdominal pain, and digestive problems when consumes yeast or foods containing yeast  
  
Current Immunizations  Never Reviewed No immunizations on file. Not reviewed this visit You Were Diagnosed With   
  
 Codes Comments Pain from implanted hardware, subsequent encounter    -  Primary ICD-10-CM: N58.798P ICD-9-CM: V58.89 Vitals BP Pulse Temp Resp Height(growth percentile) Weight(growth percentile) 104/55 83 97.7 °F (36.5 °C) (Oral) 16 5' (1.524 m) 144 lb 9.6 oz (65.6 kg) SpO2 BMI OB Status Smoking Status 96% 28.24 kg/m2 Hysterectomy Former Smoker BMI and BSA Data Body Mass Index Body Surface Area  
 28.24 kg/m 2 1.67 m 2 Preferred Pharmacy Pharmacy Name Phone ON-SITE  - [place] [id number] [facility] [phone] Your Updated Medication List  
  
   
This list is accurate as of: 10/16/17 12:20 PM.  Always use your most recent med list.  
  
  
  
  
 acetaminophen 500 mg tablet Commonly known as:  TYLENOL Take 1,000 mg by mouth every six (6) hours as needed. ARMOUR THYROID 30 mg tablet Generic drug:  thyroid (Pork) Take 45 mg by mouth daily. CARAFATE 1 gram tablet Generic drug:  sucralfate Take 1 g by mouth four (4) times daily as needed. cyanocobalamin 1,000 mcg/mL injection Commonly known as:  VITAMIN B12  
1,000 mcg by IntraMUSCular route every thirty (30) days. dextroamphetamine-amphetamine 30 mg tablet Commonly known as:  ADDERALL Take 1 Tab by mouth three (3) times dailyIndications: Attention-Deficit Hyperactivity Disorder, inattention Earliest Fill Date: 11/8/17. Max Daily Amount: 3 Tabs Start taking on:  11/8/2017  
  
 estradiol 0.5 mg tablet Commonly known as:  ESTRACE Take  by mouth daily. Compound base cream estrodiol 125 mcg; estriol 0.5 mg; DHEA 10 mg, progesterone 40 mg  
  
 gabapentin 300 mg capsule Commonly known as:  NEURONTIN Take 1 Tab QHS x1 week, then BID x1 week, then TID  Indications: NEUROPATHIC PAIN  
  
 levothyroxine 25 mcg tablet Commonly known as:  SYNTHROID Take  by mouth Daily (before breakfast). NexIUM 40 mg capsule Generic drug:  esomeprazole Take 40 mg by mouth daily. oxyCODONE ER 10 mg ER tablet Commonly known as:  OxyCONTIN Take 5 mg by mouth every twelve (12) hours. PRIVIGEN 10 % infusion Generic drug:  immune globulin 10% (human) 40 g by IntraVENous route every thirty (30) days. propranolol 20 mg tablet Commonly known as:  INDERAL Take  by mouth two (2) times a day. RESTASIS 0.05 % ophthalmic emulsion Generic drug:  cycloSPORINE Administer 1 Drop to both eyes two (2) times a day. ZOLMitriptan 5 mg tablet Commonly known as:  ZOMIG  
TAKE 1 TABLET BY MOUTH AS NEEDED FOR MIGRAINE FOR UP TO 90 DAYS  Indications: MIGRAINE Follow-up Instructions Return for with Dr. Jonnathan Scott for possible removal of hardware. Introducing [facility] [place] [facility]! Dear Cecilia Higgins: 
Thank you for requesting a [employer] account. Our records indicate that you already have an active [employer] account. You can access your account anytime at [email or web address] Did you know that you can access your hospital and ER discharge instructions at any time in [employer]? You can also review all of your test results from your hospital stay or ER visit. Additional Information If you have questions, please visit the Frequently Asked Questions section of the [employer] website at [email or web address] Remember, [employer] is NOT to be used for urgent needs. For medical emergencies, dial 911. Now available from your iPhone and Android! Please provide this summary of care documentation to your next provider. Your primary care clinician is listed as Daniel Rae.  If you have any questions after today's visit, please call [phone].

## 2017-10-16 NOTE — PROGRESS NOTES
Jhoan Carmona [facility], [place] [address]  [address], [address]  [place] [address]  Phone: [phone]  Fax: [phone]        Dario Hester  : 1950  PCP: Debby Villagomez MD    PROGRESS NOTE      ASSESSMENT AND PLAN    Diagnoses and all orders for this visit:    1. Pain from implanted hardware, subsequent encounter    1. Advised to continue HEP. 2. Increase Gabapentin to 300 mg QAM and 600 mg QHS. 3. Referral to Dr. Francisco Pierre for poss removal of hardware. Follow-up Disposition:  Return for with Dr. Francisco Pierre for possible removal of hardware. HISTORY OF PRESENT ILLNESS  Shira Ray is a 73 y.o. female. Pt presents to the office for a f/u visit for back pain. Last visit pt was sent to have a lumbar spine CT. Images reviewed with the pt. Osteophytes at hardware. Pt continues to have back pain. Pt reports pain does radiate into her RLE into her toes. She notes that she has numbness and pain in her foot. She describes her pain as a burning pain. Her burning pain started after a physician palpated her abdomen to examine her for a hernia. She states that she also has a colorectal exam that day which increased her pain. She reports intermittent fevers since her back sx. Has been seen by ID. Had Indium scan, no source of infection noted. Patient feels that her hardware is causing her pain and fevers. Pt states that she has been running a fever for 20+ months. She states that starting on Gabapentin relieved her fevers x5 days. Her pain will interrupt her sleep at night. She reports soreness in her bilateral calves. Pt denies weakness in her legs. Pt denies saddle paresthesias. Pt states she has been using Roxicodone from [facility]. She is on Gabapentin 300 mg BID, has not tried taking TID. She has been having some cognitive issues since starting Gabapentin. Pt has noticed jerking at night, new symptom for her.  Denies persistent fevers, chills, weight changes, neurogenic bowel or bladder symptoms. Pt denies recent ED visits or hospitalizations. Pain Assessment  10/16/2017   Location of Pain Back;Leg   Pain Location Comment -   Location Modifiers -   Severity of Pain 3   Quality of Pain Aching   Quality of Pain Comment numbness, tingling in legs   Duration of Pain Persistent   Frequency of Pain Constant   Date Pain First Started -   Aggravating Factors Stairs; Walking;Standing   Limiting Behavior -   Relieving Factors -   Relieving Factors Comment -   Result of Injury No            Abscess Localization [facility] Body 10/3/17 from [facility]:    Impression:  ===========    No focal areas of abnormal white blood cell accumulation to suggest active infection. CT Results (most recent):    Results from Hospital Encounter encounter on 10/13/17   CT SPINE LUMB W CONT   Narrative CT of the lumbar spine without contrast    CPT CODE: 28430    HISTORY: Status post lumbar fusion surgery with painful hardware, fatigue and  fever. COMPARISON: 6/15/16     TECHNIQUE: Helical axial images to the lumbar spine are obtained without  intrathecal contrast.  Sagittal and coronal reconstructions were obtained to  better evaluate alignment, disc space heights, interfacet relations and the  vertebral integrity. All CT scans at this facility performed using dose optimization techniques as  appreciated to a performed exam, to include automated exposure control,  adjustment of the mA and or KU according to patient size (including appropriate  matching for site specific examination), or use of iterative reconstruction  technique. FINDINGS:     Status post fusion surgery at L5-S1 with partial laminectomy again noted. The  right L5 interpedicular screw is again positioned at the superior lateral margin  of the pedicle and marginally within the spur of the lateral L5 vertebral body,  similar to the prior study. Minimal lucency surrounding the screw versus streak  artifact appears similar as well.  The left L5 intrapedicular screw is in good  position. The tips of bilateral S1 screws is slightly protruding beyond the  superior edge of bilateral sacrum, similar to the prior study. Moderate  levoscoliosis at level of L4-5 appears similar in degree. The 2 interbody fusion  cages appear unchanged in position with again no solid across seen bony fusion  noted. The mild anterolisthesis of L5 on S1 appears stable and measuring 6 mm. The retrolisthesis of L1 on L2 appears stable as well, measuring 5 mm. T11-T12: No significant abnormality. There are patent bony spinal canal and  bilateral foramina. T12-L1 Severe disc space narrowing with endplate sclerosis again noted, more  pronounced on the left and similar to the prior study . Asymmetric left lateral  osteophytes also seen. L1-2: Mild anterolisthesis of L1 on L2 appears stable as described above. Moderate left lateral disc space narrowing with osteophytes also seen at L1 to,  less severe than T12-L1. Moderate facet disease also noted. There is mild canal  stenosis and mild to moderate bilateral bony foraminal stenosis. L2-3: Minimal retrolisthesis of L2 on L3 measures 2.3 mm, unchanged since the  prior study. Mild posterior disc space narrowing and mild disc bulging appears  stable. Mild facet hypertrophy with mild canal and foramina stenosis, stable. L3-4: Mild discal bulging and mild to moderate facet hypertrophy present. There  is mild canal and bilateral foramina stenosis, grossly stable. L4-5: Moderate disc space narrowing and diffuse but mild discal bulging present  at this level. Significant streak artifact from hardware obscures the detail  evaluation. There is no significant canal or foramina stenosis. L5-S1: Significant bilateral facet degenerative changes with extensive  osteophytes surrounding the hardware. There is also moderate canal stenosis at  inferior L5-S1 due to anterolisthesis, similar to the prior study.  No  significant foramina stenosis. Imaged paraspinal region shows mild postop scarring in posterior cervical  region, similar to the prior study. Impression IMPRESSION:    1. Postop changes from prior L5-S1 fusion and partial laminectomy resection  appear grossly unchanged. The right L5 pedicular screw positioned along the  superior lateral margin of the pedicle and abutting lateral edge of the L5 with  surrounding spurring is again noted. Subtle lucency surrounding the L5 pedicle  screw versus streak artifact also seen with overall no no change seen as the  prior CT. 2. Moderate anterolisthesis at L5-S1 appears unchanged which results canal  stenosis. Again, no solid bony fusion across the L5-S1 disc space. 3. Mild retrolisthesis of L1 on L2 and L5-L3 appear stable as well. 4. Significant DDD at T12-L1 and L1-2 as well as multilevel facet disease appear  grossly stable as well as described above. Thank you for your referral.            PAST MEDICAL HISTORY   Past Medical History:   Diagnosis Date    ADD (attention deficit disorder)     Arthritis     Chronic low back pain     Cold hands and feet     Depression     ADD    Esophageal reflux     Fall at home     Fatigue     Fibromyalgia     GERD (gastroesophageal reflux disease)     H/O dehydration     Headache(784.0)     Hiatal hernia 2004    Hyperlipidemia     Hypertension     Hypoglycemia     IgG deficiency (HCC)     Lumbago     Migraine     Nausea & vomiting     Pain in joint, pelvic region and thigh     Painful sex     sometimes    Poor appetite 2001    S/P lumbar fusion 11/17/2015    1. Bilateral L5-S1 laminotomy, medial facetectomy, foraminotomy. 2. L5-S1 transforaminal lumbar interbody fusion with PEEK cage and demineralized bone matrix. 3. Posterolateral fusion L5-S1. 4. Segmental spinal instrumentation, L5-S1, DePuy Expedium type.   11/16/2015 by Dr. David Thacker     Sinus infection     SOB (shortness of breath)     Spinal stenosis, lumbar region, without neurogenic claudication 10/19/2015    Strep throat     Swallowing difficulty     Swelling     legs and feet     Thoracic or lumbosacral neuritis or radiculitis, unspecified     Thyroid disease     Thyroid disease     Tortuous colon        Past Surgical History:   Procedure Laterality Date    HX BREAST AUGMENTATION      rejected and subsequently removed    HX CHOLECYSTECTOMY      HX HEENT  4/2014    FACIAL MUSCLE SX-ON TEMPLE-due to muscle spasm    HX HYSTERECTOMY      HX LUMBAR FUSION  11-16-15    L5/S1 Laminectomy Fusion/TLIF    HX ORTHOPAEDIC  11/16/15    back surgery    HX OTHER SURGICAL      tooth extraction on 6/16 and root canal on 6/20     HX PELVIC LAPAROSCOPY     . MEDICATIONS    Current Outpatient Prescriptions   Medication Sig Dispense Refill    levothyroxine (SYNTHROID) 25 mcg tablet Take  by mouth Daily (before breakfast).  propranolol (INDERAL) 20 mg tablet Take  by mouth two (2) times a day.  estradiol (ESTRACE) 0.5 mg tablet Take  by mouth daily. Compound base cream estrodiol 125 mcg; estriol 0.5 mg; DHEA 10 mg, progesterone 40 mg      [START ON 11/8/2017] dextroamphetamine-amphetamine (ADDERALL) 30 mg tablet Take 1 Tab by mouth three (3) times dailyIndications: Attention-Deficit Hyperactivity Disorder, inattention Earliest Fill Date: 11/8/17. Max Daily Amount: 3 Tabs 90 Tab 0    gabapentin (NEURONTIN) 300 mg capsule Take 1 Tab QHS x1 week, then BID x1 week, then TID  Indications: NEUROPATHIC PAIN 90 Cap 1    oxyCODONE ER (OXYCONTIN) 10 mg ER tablet Take 5 mg by mouth every twelve (12) hours.  esomeprazole (NEXIUM) 40 mg capsule Take 40 mg by mouth daily.  sucralfate (CARAFATE) 1 gram tablet Take 1 g by mouth four (4) times daily as needed.  thyroid, Pork, (ARMOUR THYROID) 30 mg tablet Take 45 mg by mouth daily.       ZOLMitriptan (ZOMIG) 5 mg tablet TAKE 1 TABLET BY MOUTH AS NEEDED FOR MIGRAINE FOR UP TO 90 DAYS Indications: MIGRAINE 54 Tab 3    cycloSPORINE (RESTASIS) 0.05 % ophthalmic emulsion Administer 1 Drop to both eyes two (2) times a day.  acetaminophen (TYLENOL) 500 mg tablet Take 1,000 mg by mouth every six (6) hours as needed.  immune globulin 10%, human, (PRIVIGEN) 10 % infusion 40 g by IntraVENous route every thirty (30) days.  cyanocobalamin (VITAMIN B12) 1,000 mcg/mL injection 1,000 mcg by IntraMUSCular route every thirty (30) days. ALLERGIES  Allergies   Allergen Reactions    Atorvastatin Other (comments)     ALL CHOLESTROL MEDS    Avelox [Moxifloxacin] Other (comments)     Other reaction(s): other/intolerance  Seizures  Other reaction(s): Unknown Reaction  Other reaction(s): other/intolerance  Seizures  Nerve pain      Azithromycin Rash and Other (comments)     Other reaction(s): unknown  Pt states is not allergic to this med  Other reaction(s): unknown  Pt states is not allergic to this med  Acute toxic reaction    Bactrim [Sulfamethoprim Ds] Other (comments)     Severe abdominal pain    Bupropion Other (comments)     Other reaction(s): Muscle Pain  Other reaction(s): other/intolerance  Muscle pains  All antidepressants. Muscle pains  All antidepressants.      Cefuroxime Axetil Unknown (comments)    Ciprofloxacin Other (comments)     Other reaction(s): other/intolerance  Lowers bp  Other reaction(s): Unknown Reaction  Other reaction(s): other/intolerance  Lowers bp  AMS    Diazepam Other (comments)    Duloxetine Other (comments)     shocks in brain  Other reaction(s): other/intolerance  \"shocks in brain\"  \"shocks in brain\"    Focalin [Dexmethylphenidate] Other (comments)     Other reaction(s): Unknown Reaction  Pt denies any allergic    Keflex [Cephalexin] Other (comments)     Other reaction(s): Abdominal pain  Other reaction(s): gi distress  Abdominal pain      Macrobid [Nitrofurantoin Monohyd/M-Cryst] Swelling     Other reaction(s): other/intolerance  Swelling of colon  Other reaction(s): other/intolerance  Swelling of colon    Miralax [Polyethylene Glycol 3350] Unknown (comments)    Nitrofurantoin Macrocrystalline Other (comments)     Swelling of colon    Other Medication Other (comments) and Swelling     Other reaction(s): Unknown Reaction  colon  Dissolving sutures    Oxymorphone Other (comments)    Peridex [Chlorhexidine Gluconate] Swelling     numbness    Phenergan [Promethazine] Other (comments)     Other reaction(s): neurological reaction  \" i see things\"  Other reaction(s): Unknown Reaction  Other reaction(s): neurological reaction  \" i see things\"  hullucinations    Statins-Hmg-Coa Reductase Inhibitors Other (comments)    Sucralfate Myalgia     Other reaction(s): muscle/joint pain  patient denies. Patient able to tolerate now     Sulfa (Sulfonamide Antibiotics) Hives    Sulfamethoxazole-Trimethoprim Other (comments)    Sulfate Salt Unknown (comments)    Venlafaxine Itching     Other reaction(s): mild rash/itching    Yeast Other (comments)    Yeast, Dried Other (comments) and Hives     Patient reported extreme lethargy, bloating/abdominal pain, and digestive problems when consumes yeast or foods containing yeast          SOCIAL HISTORY    Social History     Social History    Marital status:      Spouse name: N/A    Number of children: N/A    Years of education: N/A     Occupational History    Not on file.      Social History Main Topics    Smoking status: Former Smoker    Smokeless tobacco: Never Used    Alcohol use No    Drug use: No    Sexual activity: Yes     Birth control/ protection: Surgical      Comment: Hysterectomy     Other Topics Concern    Not on file     Social History Narrative       FAMILY HISTORY  Family History   Problem Relation Age of Onset    Hypertension Mother     Cancer Mother      Breast cancer    Heart Disease Father        REVIEW OF SYSTEMS  Review of Systems   Constitutional: Negative for chills, fever and weight loss. Respiratory: Negative for shortness of breath. Cardiovascular: Negative for chest pain. Gastrointestinal: Negative for constipation. Negative for fecal incontinence   Genitourinary: Negative for dysuria. Negative for urinary incontinence   Musculoskeletal:        Per HPI   Skin: Negative for rash. Neurological: Negative for dizziness, tingling, tremors, focal weakness and headaches. Endo/Heme/Allergies: Does not bruise/bleed easily. Psychiatric/Behavioral: The patient does not have insomnia. PHYSICAL EXAMINATION  Visit Vitals    /55    Pulse 83    Temp 97.7 °F (36.5 °C) (Oral)    Resp 16    Ht 5' (1.524 m)    Wt 144 lb 9.6 oz (65.6 kg)    SpO2 96%    BMI 28.24 kg/m2         Accompanied by self. Constitutional:  Well developed, well nourished, in no acute distress. Psychiatric: Affect and mood are appropriate. Integumentary: No rashes or abrasions noted on exposed areas. Cardiovascular/Peripheral Vascular: Intact l pulses. No peripheral edema is noted. Lymphatic:  No evidence of lymphedema. No cervical lymphadenopathy. SPINE/MUSCULOSKELETAL EXAM      Lumbar spine:  No rash, ecchymosis, or gross obliquity. No fasciculations. No focal atrophy is noted. Tenderness to palpation L5-S1. No tenderness to palpation at the sciatic notch. SI joints non-tender. Trochanters non tender. Sensation grossly intact to light touch. MOTOR:       Hip Flex  Quads Hamstrings Ankle DF EHL Ankle PF   Right +4/5 +4/5 +4/5 +4/5 +4/5 +4/5   Left +4/5 +4/5 +4/5 +4/5 +4/5 +4/5     Straight leg raise is negative. Ambulation without assistive device. FWB. Written by Shelley Wilson, as dictated by Charli Ramirez MD.    I, Dr. Charli Ramirez MD, confirm that all documentation is accurate. Ms. Lyndsey Mojica may have a reminder for a \"due or due soon\" health maintenance.  I have asked that she contact her primary care provider for follow-up on this health maintenance.

## 2017-10-24 ENCOUNTER — OFFICE VISIT (OUTPATIENT)
Dept: PAIN MANAGEMENT | Age: 67
End: 2017-10-24

## 2017-10-24 VITALS
TEMPERATURE: 97 F | WEIGHT: 144 LBS | BODY MASS INDEX: 28.27 KG/M2 | RESPIRATION RATE: 12 BRPM | HEART RATE: 102 BPM | DIASTOLIC BLOOD PRESSURE: 86 MMHG | SYSTOLIC BLOOD PRESSURE: 121 MMHG | HEIGHT: 60 IN

## 2017-10-24 DIAGNOSIS — M25.511 CHRONIC RIGHT SHOULDER PAIN: ICD-10-CM

## 2017-10-24 DIAGNOSIS — M96.1 LUMBAR POST-LAMINECTOMY SYNDROME: ICD-10-CM

## 2017-10-24 DIAGNOSIS — M25.512 CHRONIC LEFT SHOULDER PAIN: ICD-10-CM

## 2017-10-24 DIAGNOSIS — M43.16 SPONDYLOLISTHESIS OF LUMBAR REGION: ICD-10-CM

## 2017-10-24 DIAGNOSIS — M48.02 CERVICAL SPINAL STENOSIS: ICD-10-CM

## 2017-10-24 DIAGNOSIS — M25.512 CHRONIC PAIN OF BOTH SHOULDERS: ICD-10-CM

## 2017-10-24 DIAGNOSIS — M48.061 SPINAL STENOSIS, LUMBAR REGION, WITHOUT NEUROGENIC CLAUDICATION: ICD-10-CM

## 2017-10-24 DIAGNOSIS — G89.29 CHRONIC RIGHT SHOULDER PAIN: ICD-10-CM

## 2017-10-24 DIAGNOSIS — G89.4 CHRONIC PAIN SYNDROME: Primary | ICD-10-CM

## 2017-10-24 DIAGNOSIS — M51.37 DEGENERATION OF LUMBAR OR LUMBOSACRAL INTERVERTEBRAL DISC: ICD-10-CM

## 2017-10-24 DIAGNOSIS — M25.511 CHRONIC PAIN OF BOTH SHOULDERS: ICD-10-CM

## 2017-10-24 DIAGNOSIS — G89.29 CHRONIC LEFT SHOULDER PAIN: ICD-10-CM

## 2017-10-24 DIAGNOSIS — M47.812 CERVICAL SPONDYLOSIS WITHOUT MYELOPATHY: ICD-10-CM

## 2017-10-24 DIAGNOSIS — M47.816 LUMBAR FACET ARTHROPATHY: ICD-10-CM

## 2017-10-24 DIAGNOSIS — M47.817 LUMBOSACRAL SPONDYLOSIS WITHOUT MYELOPATHY: ICD-10-CM

## 2017-10-24 DIAGNOSIS — G89.29 CHRONIC PAIN OF BOTH SHOULDERS: ICD-10-CM

## 2017-10-24 DIAGNOSIS — M47.812 CERVICAL FACET SYNDROME: ICD-10-CM

## 2017-10-24 DIAGNOSIS — M19.019 SHOULDER ARTHRITIS: ICD-10-CM

## 2017-10-27 ENCOUNTER — OFFICE VISIT (OUTPATIENT)
Dept: ORTHOPEDIC SURGERY | Age: 67
End: 2017-10-27

## 2017-10-27 VITALS
TEMPERATURE: 98.3 F | WEIGHT: 144.2 LBS | RESPIRATION RATE: 16 BRPM | DIASTOLIC BLOOD PRESSURE: 68 MMHG | BODY MASS INDEX: 28.31 KG/M2 | HEIGHT: 60 IN | SYSTOLIC BLOOD PRESSURE: 102 MMHG | OXYGEN SATURATION: 97 % | HEART RATE: 97 BPM

## 2017-10-27 DIAGNOSIS — G89.4 CHRONIC PAIN SYNDROME: ICD-10-CM

## 2017-10-27 DIAGNOSIS — M51.36 DDD (DEGENERATIVE DISC DISEASE), LUMBAR: Primary | ICD-10-CM

## 2017-10-27 PROBLEM — M25.511 CHRONIC RIGHT SHOULDER PAIN: Status: ACTIVE | Noted: 2017-10-27

## 2017-10-27 PROBLEM — G89.29 CHRONIC LEFT SHOULDER PAIN: Status: ACTIVE | Noted: 2017-10-27

## 2017-10-27 PROBLEM — G89.29 CHRONIC RIGHT SHOULDER PAIN: Status: ACTIVE | Noted: 2017-10-27

## 2017-10-27 PROBLEM — M19.019 SHOULDER ARTHRITIS: Status: ACTIVE | Noted: 2017-10-27

## 2017-10-27 PROBLEM — M25.512 CHRONIC LEFT SHOULDER PAIN: Status: ACTIVE | Noted: 2017-10-27

## 2017-10-27 RX ORDER — DICLOFENAC EPOLAMINE 0.01 G/1
1 PATCH TOPICAL EVERY 12 HOURS
COMMUNITY

## 2017-10-27 NOTE — MR AVS SNAPSHOT
Visit Information Date & Time Provider Department Dept. Phone Encounter #  
 10/27/2017 12:50 PM Augustine Argueta MD [facility] [phone] [id number] Follow-up Instructions Return for with Dr. Yonny Mccoy. Your Appointments 11/7/2017  8:20 AM  
PROCEDURE with Aretha Galloway MD  
[facility] (LIBBY SCHEDULING) Appt Note: Rt. Cervical RFA per Henick at C5-C7.......... [facility] [address] [facility] [address] [place] [address]  
[phone]  
  
   
 [place] [id number]  
  
    
 11/28/2017  7:30 AM  
PROCEDURE with MD CIARAN Felder [facility] NEUR[facility] (LIBBY SCHEDULING) Appt Note: Lt. Cervical RFA per Henick at C5-C7......... [facility] [address] [facility] [address] [place] [address]  
[phone] Upcoming Health Maintenance Date Due DTaP/Tdap/Td series (1 - Tdap) 10/17/1972 ZOSTER VACCINE AGE 60> 8/17/2011 GLAUCOMA SCREENING Q2Y 10/17/2016 Pneumococcal 65+ High/Highest Risk (1 of 2 - PCV13) 10/17/2016 MEDICARE YEARLY EXAM 10/17/2016 BREAST CANCER SCRN MAMMOGRAM 6/18/2017 INFLUENZA AGE 9 TO ADULT 8/1/2017 COLONOSCOPY 12/8/2026 Allergies as of 10/27/2017  Review Complete On: 10/27/2017 By: Aretha Galloway MD  
  
 Severity Noted Reaction Type Reactions Lettuce Medium 10/27/2017    Contact Dermatitis Atorvastatin    Other (comments) ALL CHOLESTROL MEDS Avelox [Moxifloxacin]  06/26/2013    Other (comments) Other reaction(s): other/intolerance Seizures Other reaction(s): Unknown Reaction Other reaction(s): other/intolerance Seizures Nerve pain Azithromycin  09/24/2013    Rash, Other (comments) Other reaction(s): unknown Pt states is not allergic to this med Other reaction(s): unknown Pt states is not allergic to this med Acute toxic reaction Bactrim [Sulfamethoprim Ds]  12/20/2013    Other (comments) Severe abdominal pain Bupropion  10/24/2008    Other (comments) Other reaction(s): Muscle Pain Other reaction(s): other/intolerance Muscle pains All antidepressants. Muscle pains All antidepressants. Cefuroxime Axetil  02/24/2016    Unknown (comments) Ciprofloxacin  01/21/2014    Other (comments) Other reaction(s): other/intolerance Lowers bp Other reaction(s): Unknown Reaction Other reaction(s): other/intolerance Lowers bp AMS Diazepam    Other (comments) Duloxetine  10/24/2008    Other (comments)  
 shocks in brain Other reaction(s): other/intolerance \"shocks in brain\" \"shocks in brain\" Focalin [Dexmethylphenidate]  05/14/2014    Other (comments) Other reaction(s): Unknown Reaction Pt denies any allergic Keflex [Cephalexin]  09/12/2014    Other (comments) Other reaction(s): Abdominal pain Other reaction(s): gi distress Abdominal pain Macrobid [Nitrofurantoin Monohyd/m-cryst]  03/21/2014    Swelling Other reaction(s): other/intolerance Swelling of colon Other reaction(s): other/intolerance Swelling of colon Miralax [Polyethylene Glycol 3350]  09/01/2017    Unknown (comments) Nitrofurantoin Macrocrystalline  04/07/2014    Other (comments) Swelling of colon Other Medication    Swelling, Other (comments) Other reaction(s): Unknown Reaction 
colon Dissolving sutures Saline implants Preservatives Oxymorphone  06/05/2017    Other (comments) Peridex [Chlorhexidine Gluconate]  08/03/2017    Swelling  
 numbness Phenergan [Promethazine]  07/04/2012    Other (comments) Other reaction(s): neurological reaction \" i see things\" Other reaction(s): Unknown Reaction Other reaction(s): neurological reaction \" i see things\" hullucinations Statins-hmg-coa Reductase Inhibitors  02/24/2016    Other (comments) Sucralfate  08/14/2009    Myalgia Other reaction(s): muscle/joint pain 
patient denies. Patient able to tolerate now Sulfa (Sulfonamide Antibiotics)  10/15/2013    Hives Sulfamethoxazole-trimethoprim  05/21/2016    Other (comments) Sulfate Salt    Unknown (comments) Venlafaxine  10/07/2010    Itching Other reaction(s): mild rash/itching Yeast    Other (comments) Yeast, Dried  11/04/2015    Other (comments), Hives Patient reported extreme lethargy, bloating/abdominal pain, and digestive problems when consumes yeast or foods containing yeast  
  
Current Immunizations  Never Reviewed No immunizations on file. Not reviewed this visit You Were Diagnosed With   
  
 Codes Comments DDD (degenerative disc disease), lumbar    -  Primary ICD-10-CM: M51.36 
ICD-9-CM: 722.52 Chronic pain syndrome     ICD-10-CM: G89.4 ICD-9-CM: 338. 4 Vitals BP Pulse Temp Resp Height(growth percentile) Weight(growth percentile) 102/68 97 98.3 °F (36.8 °C) (Oral) 16 5' (1.524 m) 144 lb 3.2 oz (65.4 kg) SpO2 BMI OB Status Smoking Status 97% 28.16 kg/m2 Hysterectomy Former Smoker BMI and BSA Data Body Mass Index Body Surface Area  
 28.16 kg/m 2 1.66 m 2 Preferred Pharmacy Pharmacy Name [facility] - [facility], [address] [facility] [phone] Your Updated Medication List  
  
   
This list is accurate as of: 10/27/17  1:43 PM.  Always use your most recent med list.  
  
  
  
  
 acetaminophen 500 mg tablet Commonly known as:  TYLENOL Take 1,000 mg by mouth every six (6) hours as needed. ARMOUR THYROID 30 mg tablet Generic drug:  thyroid (Pork) Take 45 mg by mouth daily. CARAFATE 1 gram tablet Generic drug:  sucralfate Take 1 g by mouth four (4) times daily as needed. cyanocobalamin 1,000 mcg/mL injection Commonly known as:  VITAMIN B12  
1,000 mcg by IntraMUSCular route every thirty (30) days. dextroamphetamine-amphetamine 30 mg tablet Commonly known as:  ADDERALL Take 1 Tab by mouth three (3) times dailyIndications: Attention-Deficit Hyperactivity Disorder, inattention Earliest Fill Date: 11/8/17. Max Daily Amount: 3 Tabs Start taking on:  11/8/2017  
  
 diclofenac 1.3 % Pt12 Commonly known as:  FLECTOR  
1 Patch by TransDERmal route every twelve (12) hours every twelve (12) hours. estradiol 0.5 mg tablet Commonly known as:  ESTRACE Take  by mouth daily. Compound base cream estrodiol 125 mcg; estriol 0.5 mg; DHEA 10 mg, progesterone 40 mg  
  
 gabapentin 300 mg capsule Commonly known as:  NEURONTIN Take 1 Tab QHS x1 week, then BID x1 week, then TID  Indications: NEUROPATHIC PAIN  
  
 levothyroxine 25 mcg tablet Commonly known as:  SYNTHROID Take  by mouth Daily (before breakfast). NexIUM 40 mg capsule Generic drug:  esomeprazole Take 40 mg by mouth daily. oxyCODONE ER 10 mg ER tablet Commonly known as:  OxyCONTIN Take 5 mg by mouth every twelve (12) hours. PRIVIGEN 10 % infusion Generic drug:  immune globulin 10% (human) 40 g by IntraVENous route every thirty (30) days. propranolol 20 mg tablet Commonly known as:  INDERAL Take  by mouth two (2) times a day. RESTASIS 0.05 % ophthalmic emulsion Generic drug:  cycloSPORINE Administer 1 Drop to both eyes two (2) times a day. ZOLMitriptan 5 mg tablet Commonly known as:  ZOMIG  
TAKE 1 TABLET BY MOUTH AS NEEDED FOR MIGRAINE FOR UP TO 90 DAYS  Indications: MIGRAINE We Performed the Following REFERRAL TO PHYSICAL THERAPY [VTH79 Custom] Comments:  
 Aqua therapy Follow-up Instructions Return for with Dr. Iav Castillo. Referral Information Referral ID Referred By Referred To  
  
 [id number] [facility] Not Available Visits Status Start Date End Date 1 New Request 10/27/17 10/27/18  If your referral has a status of pending review or denied, additional information will be sent to support the outcome of this decision. Introducing [facility]! Dear Marshall Nuñez: 
Thank you for requesting a [employer] account. Our records indicate that you already have an active [employer] account. You can access your account anytime at [email or web address] Did you know that you can access your hospital and ER discharge instructions at any time in [employer]? You can also review all of your test results from your hospital stay or ER visit. Additional Information If you have questions, please visit the Frequently Asked Questions section of the [employer] website at [email or web address] Remember, [employer] is NOT to be used for urgent needs. For medical emergencies, dial 911. Now available from your iPhone and Android! Please provide this summary of care documentation to your next provider. Your primary care clinician is listed as Paetl Armando. If you have any questions after today's visit, please call [phone].

## 2017-10-27 NOTE — PROGRESS NOTES
[facility]  Interventional Pain Management Consultation History & Physical    PATIENT NAME:  Janna Ray     YOB: 1950    DATE OF SERVICE:   10/24/2017      CHIEF COMPLAINT:  Back Pain      REASON FOR VISIT:   Janna Ray presents to the pain clinic today for follow on evaluation and to consider interventional pain management options as indicated for the type and location of the pain the patient is presenting with. HISTORY OF PRESENT ILLNESS:   Patient presents for follow-up evaluation and consideration for further procedures. She is well known to our pain clinic, she has history of chronic low back pain as well as mid and upper back pain, neck pain. She had L5-S1 spondylolytic correction surgery with decompression and fixation with pedicle screws several months ago. She continues to have low back pain. She also has chronic fevers of unknown origin. She has immunoglobulin deficiency. She receives monthly immunoglobulin infusions. She recently did cervical radiofrequency neurotomy procedures with right side C4-5, C5-6, C6-7 levels done on November 28, 2016. We followed up to the left side, same levels, December 14, 2016. She initially did well from these procedures with a great deal pain relief. She has begun having her pain symptoms returned. She now has most notably chronic bilateral shoulder pain, as well as a number of other chronic pain complaints. She has a number of other very complicated medical comorbidities. ASSESSMENT/OPTIONS: as follows. We discussed options. At this point, moving forward, I will get x-ray images of both shoulders. If there are no clear contraindications, I may be able to set her up for bilateral shoulder injections local anesthetic and steroid, image guided. She very likely has chronic osteoarthritis of both shoulders.   She may have been referred to me for possible spinal cord stimulator trial, she attended 1 of our [employer] representative lead informational meetings. I am somewhat reluctant to offer her spinal cord stimulator trial, as she is immunosuppressed and is certainly prone to infections. It may be best to stick with other less invasive procedures at managing her chronic pain. Can either come in to review her image results of both shoulders, or I will be glad to do this for her and set up appropriate procedures as indicated. MRI Results (most recent):    Results from Abstract encounter on 04/17/17   MRI ABD W WO CONT        PAST MEDICAL HISTORY:   The patient  has a past medical history of ADD (attention deficit disorder); Arthritis; Chronic low back pain; Cold hands and feet; Depression; Esophageal reflux; Fall at home; Fatigue; Fibromyalgia; GERD (gastroesophageal reflux disease); H/O dehydration; Headache(784.0); Hiatal hernia (2004); Hyperlipidemia; Hypertension; Hypoglycemia; IgG deficiency ([facility] [address] Lumbago; Migraine; Nausea & vomiting; Pain in joint, pelvic region and thigh; Painful sex; Poor appetite (2001); S/P lumbar fusion (11/17/2015); Sinus infection; SOB (shortness of breath); Spinal stenosis, lumbar region, without neurogenic claudication (10/19/2015); Strep throat; Swallowing difficulty; Swelling; Thoracic or lumbosacral neuritis or radiculitis, unspecified; Thyroid disease; Thyroid disease; and Tortuous colon. PAST SURGICAL HISTORY:   The patient  has a past surgical history that includes breast augmentation; hysterectomy; cholecystectomy; pelvic laparoscopy; heent (4/2014); orthopaedic (11/16/15); other surgical; and lumbar fusion (11-16-15).     CURRENT MEDICATIONS:   The patient has a current medication list which includes the following prescription(s): levothyroxine, propranolol, estradiol, dextroamphetamine-amphetamine, gabapentin, oxycodone er, esomeprazole, sucralfate, thyroid (pork), zolmitriptan, cyclosporine, acetaminophen, immune globulin 10% (human), cyanocobalamin, and diclofenac. ALLERGIES:     Allergies   Allergen Reactions    Lettuce Contact Dermatitis    Atorvastatin Other (comments)     ALL CHOLESTROL MEDS    Avelox [Moxifloxacin] Other (comments)     Other reaction(s): other/intolerance  Seizures  Other reaction(s): Unknown Reaction  Other reaction(s): other/intolerance  Seizures  Nerve pain      Azithromycin Rash and Other (comments)     Other reaction(s): unknown  Pt states is not allergic to this med  Other reaction(s): unknown  Pt states is not allergic to this med  Acute toxic reaction    Bactrim [Sulfamethoprim Ds] Other (comments)     Severe abdominal pain    Bupropion Other (comments)     Other reaction(s): Muscle Pain  Other reaction(s): other/intolerance  Muscle pains  All antidepressants. Muscle pains  All antidepressants.      Cefuroxime Axetil Unknown (comments)    Ciprofloxacin Other (comments)     Other reaction(s): other/intolerance  Lowers bp  Other reaction(s): Unknown Reaction  Other reaction(s): other/intolerance  Lowers bp  AMS    Diazepam Other (comments)    Duloxetine Other (comments)     shocks in brain  Other reaction(s): other/intolerance  \"shocks in brain\"  \"shocks in brain\"    Focalin [Dexmethylphenidate] Other (comments)     Other reaction(s): Unknown Reaction  Pt denies any allergic    Keflex [Cephalexin] Other (comments)     Other reaction(s): Abdominal pain  Other reaction(s): gi distress  Abdominal pain      Macrobid [Nitrofurantoin Monohyd/M-Cryst] Swelling     Other reaction(s): other/intolerance  Swelling of colon  Other reaction(s): other/intolerance  Swelling of colon    Miralax [Polyethylene Glycol 3350] Unknown (comments)    Nitrofurantoin Macrocrystalline Other (comments)     Swelling of colon    Other Medication Swelling and Other (comments)     Other reaction(s): Unknown Reaction  colon  Dissolving sutures  Saline implants  Preservatives    Oxymorphone Other (comments)    Peridex [Chlorhexidine Gluconate] Swelling     numbness    Phenergan [Promethazine] Other (comments)     Other reaction(s): neurological reaction  \" i see things\"  Other reaction(s): Unknown Reaction  Other reaction(s): neurological reaction  \" i see things\"  hullucinations    Statins-Hmg-Coa Reductase Inhibitors Other (comments)    Sucralfate Myalgia     Other reaction(s): muscle/joint pain  patient denies. Patient able to tolerate now     Sulfa (Sulfonamide Antibiotics) Hives    Sulfamethoxazole-Trimethoprim Other (comments)    Sulfate Salt Unknown (comments)    Venlafaxine Itching     Other reaction(s): mild rash/itching    Yeast Other (comments)    Yeast, Dried Other (comments) and Hives     Patient reported extreme lethargy, bloating/abdominal pain, and digestive problems when consumes yeast or foods containing yeast       FAMILY HISTORY:   The patient family history includes Cancer in her mother; Heart Disease in her father; Hypertension in her mother. SOCIAL HISTORY:   The patient  reports that she has quit smoking. She has never used smokeless tobacco. The patient  reports that she does not drink alcohol. She also  reports that she does not use illicit drugs. REVIEW OF SYSTEMS:    The patient denies fever, chills, weight loss (Constitutional), rash, itching (Skin), tinnitus, congestion (HENT), blurred vision, photophobia (Eyes), palpitations, orthopnea (Cardiovascular), hemoptysis, wheezing (Respiratory), nausea, vomiting, diarrhea (Gastrointestinal), dysuria, hematuria, urgency (Genitourinary), bowel or bladder incontinence, loss of consciousness (Neurologic), suicidal or homicidal ideation or hallucinations (Psychiatric). Denies swelling, axillary or groin masses (Lymphatic).            PHYSICAL EXAM:  VS:   Visit Vitals    /86    Pulse (!) 102    Temp 97 °F (36.1 °C)    Resp 12    Ht 5' (1.524 m)    Wt 65.3 kg (144 lb)    BMI 28.12 kg/m2 General: Well-developed and well-nourished. Body habitus consistent with recorded height and weight and the calculated BMI. Apparent distress due to many pain complaints. Head: Normocephalic, atraumatic. Skin: Inspection of the skin reveals no rashes, lesions or infection. CV: Regular rate. No murmurs or rubs noted. No peripheral edema noted. Pulm: Respirations are even and unlabored. Extr: No clubbing, cyanosis, or edema noted. Musculoskeletal:  1. Cervical spine -somewhat improved ROM. Paraspinous tenderness. There is no scoliosis, asymmetry, or musculoskeletal defect. 2. Thoracic spine -decreased ROM. Paraspinous tenderness. There is no scoliosis, asymmetry, or musculoskeletal defect. 3. Lumbar spine -decreased joint motion all axes . Paraspinous tenderness lower lumbar levels. SI joints are nontender bilaterally. There is no scoliosis, asymmetry, or musculoskeletal defect. 4. Right upper extremity - Full ROM. 5/5 muscle strength in all muscle groups. No pain or tenderness in shoulder, elbow, wrist, or hand. 5. Left upper extremity - Full ROM. 5/5 muscle strength in all muscle groups. No pain or tenderness in shoulder, elbow, wrist, or hand. 6. Right lower extremity - Full ROM. 5/5 muscle strength in all muscle groups. No pain, tenderness, or swelling in the hip, knee, ankle or foot. 7. Left lower extremity - Full ROM. 5/5 muscle strength in all muscle groups. No pain, tenderness, or swelling in the hip, knee, ankle or foot. Neurological:  1. Mental Status - Alert, awake and oriented. Speech is clear and appropriate. 2. Cranial Nerves - Extraocular muscles intact bilaterally. Cranial nerves II-XII grossly intact bilaterally. 3. Gait - antalgic   4. Reflexes - 2+ and symmetric throughout. 5. Sensation - Intact to light touch and pin prick. 6. Provocative Tests - Spurlings negative bilaterally. Psychological:  1. Mood and affect - Appropriate.   2. Speech - Appropriate. 3. Though content - Appropriate. 4. Judgment - Appropriate. ASSESSMENT:      ICD-10-CM ICD-9-CM    1. Chronic pain syndrome G89.4 338.4 XR SHOULDER RT 1V      XR SHOULDER LT 1 V   2. Shoulder arthritis M19.019 716.91    3. Chronic right shoulder pain M25.511 719.41     G89.29 338.29    4. Chronic left shoulder pain M25.512 719.41     G89.29 338.29    5. Cervical spondylosis without myelopathy M47.812 721.0    6. Cervical facet syndrome M12.88 723.8    7. Cervical spinal stenosis M48.02 723.0    8. Lumbar post-laminectomy syndrome M96.1 722.83    9. Lumbar facet arthropathy M12.88 721.3    10. Chronic pain of both shoulders M25.511 719.41     G89.29 338.29     M25.512     11. Spinal stenosis, lumbar region, without neurogenic claudication M48.061 724.02    12. Spondylolisthesis of lumbar region M43.16 738.4    13. Lumbosacral spondylosis without myelopathy M47.817 721.3    14. Degeneration of lumbar or lumbosacral intervertebral disc M51.37 722.52            PLAN:    1.    I have thoroughly discussed the risks and benefits, indications, contraindications, and side effects of any and procedures that were mentioned at today's patient visit. I have used a skeleton model to explain all procedures, as well as to provide added emphasis regarding procedures and as well for patient education purposes. I have answered all questions in great detail, and I have obtained verbal confirmation for all procedures planned with the patient. 3.    I have reviewed in great detail today the patient's MRI and other imaging studies with the patient. I have explained to the patient their condition using both actual recent and relevant images insofar as I am able to obtain actual images. I have used a skeleton model for added emphasis as well as patient education. 4.    I have advised patient to have a primary care provider continue to care for their health maintenance and general medical conditions.    5,    I have placed appropriate referrals to specialty care providers as I have deemed necessary through today's clinical consultation with the patient. 5.    I have explained to the patient that if any significant side effects, issues, problems, concerns, or perceived complications may have arisen at around the time of the patient's procedures, they should either call the pain management clinic or go to the emergency room immediately for medical provider evaluation. 6.   I have encouraged all patients to call the pain management clinic with any questions or concerns that they may have pertaining to their procedures. DISPOSITION:   The patients condition and plan were discussed at length and all questions were answered. The patient agrees with the plan. A total of 25 minutes was spent with the patient of which over half of the time was spent counseling the patient. Arturo Gant MD 10/27/2017 1:26 PM    Note: Although these clinic notes were documented by the provider at the time of the exam, they have not been proofed and are subject to transcription variance.

## 2017-10-27 NOTE — PROGRESS NOTES
[address]  [address], [address]  [place], [address] [place] [address] Street  Phone: [phone]  Fax: [phone]  PROGRESS NOTE  Patient: Meli Ray                MRN: [id number]       SSN: [id number]  YOB: 1950        AGE: 78 y.o. SEX: female  Body mass index is 28.16 kg/(m^2). PCP: Dipak Kat MD  10/27/17    Chief Complaint   Patient presents with    Back Pain     back pain eval       HISTORY OF PRESENT ILLNESS, RADIOGRAPHS, and PLAN:     HISTORY:  Ms. Lamonte Payan returns today. I have not seen her in some time. She is seen today at the request of Dr. Alicia Cuevas. She has had a previous lumbar fusion. She has had a couple year history now, she states, of what she describes as fever, which her temperatures range into the 98-99°. She has had an extensive history of workups, which have included even bone marrows. She has had indium scans, and Gallium scans, sed rates that are not elevated, white counts that are normal, CT scans that are normal.  No evidence of any type of infectious process is present. She sees an allergist who treats her regularly for a variety of allergies. She is concerned that she is allergic to the pedicle screws in her back. Her fevers come at various times of the day but with some regularity in the afternoons where she says she feels like her face can go on fire or various parts of her top will be on fire or her bottom will be on fire. She has severe pains. She has to lie down and rest.  The fevers exhaust her. They generally happen on a regular time every day, but then she will have some days where they do not occur. She says the fevers have bothered her eyes and have bothered her lungs. She is currently about to see an endocrinologist, she states. She has had no physical therapy. She gets chronic back ache. Her studies demonstrate appropriately placed instrumentation with apparent fusion.   There is no evidence of loosening or other pathology regarding her instrumentation. She has had negative nuclear medicine scans for infection. She has a normal white count and sed rate. ASSESSMENT/PLAN: I have explained to her that I do not see any evidence that her pain and fever is secondary to infection or related to her instrumentation. She does have degenerative disease throughout her lumbar spine, and certainly, her back pain is likely related to her spine and arthritic change. I see no surgical pathology, no ongoing instability, and certainly, I would not recommend further interventions for her back. I would recommend some sort of graded reconditioning exercise. She is quite deconditioned and something possibly like aquatic therapy is she does not feel she is allergic to going into the pool may be useful for her. She is somewhat concerned about chlorinated water. There has been some question about her thyroid, and certainly, thyroid disease can bring about issues about sensations of fever. I see nothing that I would recommend structurally about her sine that I would treat. If she had testing that indicated some sort of allergy to metal or titanium, I certainly could take out her instrumentation, but her complaints seem to have no relationship to what I would call a metal allergy, but I would remove her lumbar instrumentation if nothing else than to take it out of her list of fears and concerns, but I think at this point, to remove her hardware would be of more risk than benefit. It would simply be the risk of infection from hardware removal, and I see the true benefit as being almost nonexistent. I explained this to her and her . We will arrange for aquatic therapy. She can follow back up with Dr. Moses Trejo.      cc: Renetta Herman MD             Past Medical History:   Diagnosis Date    ADD (attention deficit disorder)     Arthritis     Chronic low back pain     Cold hands and feet     Depression     ADD    Esophageal reflux     Fall at home     Fatigue     Fibromyalgia     GERD (gastroesophageal reflux disease)     H/O dehydration     Headache(784.0)     Hiatal hernia 2004    Hyperlipidemia     Hypertension     Hypoglycemia     IgG deficiency (HCC)     Lumbago     Migraine     Nausea & vomiting     Pain in joint, pelvic region and thigh     Painful sex     sometimes    Poor appetite 2001    S/P lumbar fusion 11/17/2015    1. Bilateral L5-S1 laminotomy, medial facetectomy, foraminotomy. 2. L5-S1 transforaminal lumbar interbody fusion with PEEK cage and demineralized bone matrix. 3. Posterolateral fusion L5-S1. 4. Segmental spinal instrumentation, L5-S1, DePuy Expedium type. 11/16/2015 by Dr. Randolph Armendariz     Sinus infection     SOB (shortness of breath)     Spinal stenosis, lumbar region, without neurogenic claudication 10/19/2015    Strep throat     Swallowing difficulty     Swelling     legs and feet     Thoracic or lumbosacral neuritis or radiculitis, unspecified     Thyroid disease     Thyroid disease     Tortuous colon        Family History   Problem Relation Age of Onset    Hypertension Mother     Cancer Mother      Breast cancer    Heart Disease Father        Current Outpatient Prescriptions   Medication Sig Dispense Refill    diclofenac (FLECTOR) 1.3 % pt12 1 Patch by TransDERmal route every twelve (12) hours every twelve (12) hours.  levothyroxine (SYNTHROID) 25 mcg tablet Take  by mouth Daily (before breakfast).  propranolol (INDERAL) 20 mg tablet Take  by mouth two (2) times a day.  estradiol (ESTRACE) 0.5 mg tablet Take  by mouth daily. Compound base cream estrodiol 125 mcg; estriol 0.5 mg; DHEA 10 mg, progesterone 40 mg      [START ON 11/8/2017] dextroamphetamine-amphetamine (ADDERALL) 30 mg tablet Take 1 Tab by mouth three (3) times dailyIndications: Attention-Deficit Hyperactivity Disorder, inattention Earliest Fill Date: 11/8/17.   Max Daily Amount: 3 Tabs 90 Tab 0  gabapentin (NEURONTIN) 300 mg capsule Take 1 Tab QHS x1 week, then BID x1 week, then TID  Indications: NEUROPATHIC PAIN 90 Cap 1    oxyCODONE ER (OXYCONTIN) 10 mg ER tablet Take 5 mg by mouth every twelve (12) hours.  esomeprazole (NEXIUM) 40 mg capsule Take 40 mg by mouth daily.  sucralfate (CARAFATE) 1 gram tablet Take 1 g by mouth four (4) times daily as needed.  thyroid, Pork, (ARMOUR THYROID) 30 mg tablet Take 45 mg by mouth daily.  ZOLMitriptan (ZOMIG) 5 mg tablet TAKE 1 TABLET BY MOUTH AS NEEDED FOR MIGRAINE FOR UP TO 90 DAYS  Indications: MIGRAINE 54 Tab 3    cycloSPORINE (RESTASIS) 0.05 % ophthalmic emulsion Administer 1 Drop to both eyes two (2) times a day.  acetaminophen (TYLENOL) 500 mg tablet Take 1,000 mg by mouth every six (6) hours as needed.  immune globulin 10%, human, (PRIVIGEN) 10 % infusion 40 g by IntraVENous route every thirty (30) days.  cyanocobalamin (VITAMIN B12) 1,000 mcg/mL injection 1,000 mcg by IntraMUSCular route every thirty (30) days. Allergies   Allergen Reactions    Lettuce Contact Dermatitis    Atorvastatin Other (comments)     ALL CHOLESTROL MEDS    Avelox [Moxifloxacin] Other (comments)     Other reaction(s): other/intolerance  Seizures  Other reaction(s): Unknown Reaction  Other reaction(s): other/intolerance  Seizures  Nerve pain      Azithromycin Rash and Other (comments)     Other reaction(s): unknown  Pt states is not allergic to this med  Other reaction(s): unknown  Pt states is not allergic to this med  Acute toxic reaction    Bactrim [Sulfamethoprim Ds] Other (comments)     Severe abdominal pain    Bupropion Other (comments)     Other reaction(s): Muscle Pain  Other reaction(s): other/intolerance  Muscle pains  All antidepressants. Muscle pains  All antidepressants.      Cefuroxime Axetil Unknown (comments)    Ciprofloxacin Other (comments)     Other reaction(s): other/intolerance  Lowers bp  Other reaction(s): Unknown Reaction  Other reaction(s): other/intolerance  Lowers bp  AMS    Diazepam Other (comments)    Duloxetine Other (comments)     shocks in brain  Other reaction(s): other/intolerance  \"shocks in brain\"  \"shocks in brain\"    Focalin [Dexmethylphenidate] Other (comments)     Other reaction(s): Unknown Reaction  Pt denies any allergic    Keflex [Cephalexin] Other (comments)     Other reaction(s): Abdominal pain  Other reaction(s): gi distress  Abdominal pain      Macrobid [Nitrofurantoin Monohyd/M-Cryst] Swelling     Other reaction(s): other/intolerance  Swelling of colon  Other reaction(s): other/intolerance  Swelling of colon    Miralax [Polyethylene Glycol 3350] Unknown (comments)    Nitrofurantoin Macrocrystalline Other (comments)     Swelling of colon    Other Medication Swelling and Other (comments)     Other reaction(s): Unknown Reaction  colon  Dissolving sutures  Saline implants  Preservatives    Oxymorphone Other (comments)    Peridex [Chlorhexidine Gluconate] Swelling     numbness    Phenergan [Promethazine] Other (comments)     Other reaction(s): neurological reaction  \" i see things\"  Other reaction(s): Unknown Reaction  Other reaction(s): neurological reaction  \" i see things\"  hullucinations    Statins-Hmg-Coa Reductase Inhibitors Other (comments)    Sucralfate Myalgia     Other reaction(s): muscle/joint pain  patient denies.   Patient able to tolerate now     Sulfa (Sulfonamide Antibiotics) Hives    Sulfamethoxazole-Trimethoprim Other (comments)    Sulfate Salt Unknown (comments)    Venlafaxine Itching     Other reaction(s): mild rash/itching    Yeast Other (comments)    Yeast, Dried Other (comments) and Hives     Patient reported extreme lethargy, bloating/abdominal pain, and digestive problems when consumes yeast or foods containing yeast       Past Surgical History:   Procedure Laterality Date    HX BREAST AUGMENTATION      rejected and subsequently removed    HX CHOLECYSTECTOMY      HX HEENT  4/2014    FACIAL MUSCLE SX-ON TEMPLE-due to muscle spasm    HX HYSTERECTOMY      HX LUMBAR FUSION  11-16-15    L5/S1 Laminectomy Fusion/TLIF    HX ORTHOPAEDIC  11/16/15    back surgery    HX OTHER SURGICAL      tooth extraction on 6/16 and root canal on 6/20     HX PELVIC LAPAROSCOPY         Past Medical History:   Diagnosis Date    ADD (attention deficit disorder)     Arthritis     Chronic low back pain     Cold hands and feet     Depression     ADD    Esophageal reflux     Fall at home     Fatigue     Fibromyalgia     GERD (gastroesophageal reflux disease)     H/O dehydration     Headache(784.0)     Hiatal hernia 2004    Hyperlipidemia     Hypertension     Hypoglycemia     IgG deficiency (HCC)     Lumbago     Migraine     Nausea & vomiting     Pain in joint, pelvic region and thigh     Painful sex     sometimes    Poor appetite 2001    S/P lumbar fusion 11/17/2015    1. Bilateral L5-S1 laminotomy, medial facetectomy, foraminotomy. 2. L5-S1 transforaminal lumbar interbody fusion with PEEK cage and demineralized bone matrix. 3. Posterolateral fusion L5-S1. 4. Segmental spinal instrumentation, L5-S1, DePuy Expedium type. 11/16/2015 by Dr. Emily Roy     Sinus infection     SOB (shortness of breath)     Spinal stenosis, lumbar region, without neurogenic claudication 10/19/2015    Strep throat     Swallowing difficulty     Swelling     legs and feet     Thoracic or lumbosacral neuritis or radiculitis, unspecified     Thyroid disease     Thyroid disease     Tortuous colon        Social History     Social History    Marital status:      Spouse name: N/A    Number of children: N/A    Years of education: N/A     Occupational History    Not on file.      Social History Main Topics    Smoking status: Former Smoker    Smokeless tobacco: Never Used    Alcohol use No    Drug use: No    Sexual activity: Yes     Birth control/ protection: Surgical      Comment: Hysterectomy     Other Topics Concern    Not on file     Social History Narrative         REVIEW OF SYSTEMS:   CONSTITUTIONAL SYMPTOMS:  Negative. EYES:  Negative. EARS, NOSE, THROAT AND MOUTH:  Negative. CARDIOVASCULAR:  Negative. RESPIRATORY:  Negative. GENITOURINARY: Negative. GASTROINTESTINAL:  Negative. INTEGUMENTARY (SKIN AND/OR BREAST):  Negative. MUSCULOSKELETAL: Per HPI.   ENDOCRINE/RHEUMATOLOGIC:  Negative. NEUROLOGICAL:  Per HPI. HEMATOLOGIC/LYMPHATIC:  Negative. ALLERGIC/IMMUNOLOGIC:  Negative. PSYCHIATRIC:  Negative. PHYSICAL EXAMINATION:   Visit Vitals    /68    Pulse 97    Temp 98.3 °F (36.8 °C) (Oral)    Resp 16    Ht 5' (1.524 m)    Wt 144 lb 3.2 oz (65.4 kg)    SpO2 97%    BMI 28.16 kg/m2    PAIN SCALE: 5/10    CONSTITUTIONAL: The patient is in no apparent distress and is alert and oriented x 3. HEENT: Normocephalic. Hearing grossly intact. NECK: Supple and symmetric. no tenderness, or masses were felt. RESPIRATORY: No labored breathing. CARDIOVASCULAR: The carotid pulses were normal. Peripheral pulses were 2+. CHEST: Normal AP diameter and normal contour without any kyphoscoliosis. LYMPHATIC: No lymphadenopathy was appreciated in the neck, axillae or groin. SKIN:  Incision healing well, no drainage, no erythema, no hernia, no seroma, no swelling, no dehiscence, incision well approximated. Negative for scars, rashes, lesions, or ulcers on the right upper, right lower, left upper, left lower and trunk. NEUROLOGICAL: Alert and oriented x 3. Ambulation without assistive device. FWB. EXTREMITIES: See musculoskeletal.  MUSCULOSKELETAL:   Head and Neck:  Negative for misalignment, asymmetry, crepitation, defects, tenderness masses or effusions.  Left Upper Extremity: Inspection, percussion and palpation preformed. Laras sign is negative.  Right Upper Extremity: Inspection, percussion and palpation preformed. Laras sign is negative.  Spine, Ribs and Pelvis: Back pain. Inspection, percussion and palpation preformed. Negative for misalignment, asymmetry, crepitation, defects, tenderness masses or effusions.  Left Lower Extremity: Inspection, percussion and palpation preformed. Negative straight leg raise.  Right Lower Extremity: Inspection, percussion and palpation preformed. Negative straight leg raise. SPINE EXAM:     Lumbar spine: No rash, ecchymosis, or gross obliquity. No focal atrophy is noted. ASSESSMENT    ICD-10-CM ICD-9-CM    1. DDD (degenerative disc disease), lumbar M51.36 722.52 REFERRAL TO PHYSICAL THERAPY   2. Chronic pain syndrome G89.4 338.4 REFERRAL TO PHYSICAL THERAPY       Written by Florida Mcfarland, as dictated by Shanita Meza MD.    I, Dr. Shanita Meza MD, confirm that all documentation is accurate.

## 2017-11-01 RX ORDER — MIDAZOLAM HYDROCHLORIDE 1 MG/ML
.5-6 INJECTION, SOLUTION INTRAMUSCULAR; INTRAVENOUS
Status: CANCELLED | OUTPATIENT
Start: 2017-11-07

## 2017-11-01 RX ORDER — SODIUM CHLORIDE 0.9 % (FLUSH) 0.9 %
5-10 SYRINGE (ML) INJECTION AS NEEDED
Status: CANCELLED | OUTPATIENT
Start: 2017-11-07

## 2017-11-17 RX ORDER — SODIUM CHLORIDE 0.9 % (FLUSH) 0.9 %
5-10 SYRINGE (ML) INJECTION AS NEEDED
Status: CANCELLED | OUTPATIENT
Start: 2017-11-28

## 2017-11-17 RX ORDER — MIDAZOLAM HYDROCHLORIDE 1 MG/ML
.5-6 INJECTION, SOLUTION INTRAMUSCULAR; INTRAVENOUS
Status: CANCELLED | OUTPATIENT
Start: 2017-11-28

## 2017-12-19 ENCOUNTER — HOSPITAL ENCOUNTER (OUTPATIENT)
Dept: CT IMAGING | Age: 67
Discharge: HOME | End: 2017-12-19
Payer: MEDICARE

## 2017-12-19 DIAGNOSIS — K76.89 LIVER CYST: ICD-10-CM

## 2017-12-19 DIAGNOSIS — R50.9 HYPERTHERMIA-INDUCED DEFECT: ICD-10-CM

## 2017-12-19 DIAGNOSIS — J98.4 DISEASE OF LUNG: ICD-10-CM

## 2017-12-19 LAB — CREAT UR-MCNC: 1 MG/DL (ref 0.6–1.3)

## 2017-12-19 PROCEDURE — 74177 CT ABD & PELVIS W/CONTRAST: CPT

## 2017-12-19 PROCEDURE — 82565 ASSAY OF CREATININE: CPT

## 2017-12-19 PROCEDURE — 74011636320 HC RX REV CODE- 636/320: Performed by: RADIOLOGY

## 2017-12-19 RX ADMIN — IOPAMIDOL 80 ML: 612 INJECTION, SOLUTION INTRAVENOUS at 09:24

## 2018-04-11 PROBLEM — N39.0 RECURRENT UTI: Status: ACTIVE | Noted: 2018-04-11

## 2018-04-11 PROBLEM — N30.10 INTERSTITIAL CYSTITIS: Status: ACTIVE | Noted: 2018-04-11

## 2018-04-11 PROBLEM — R35.0 URINARY FREQUENCY: Status: ACTIVE | Noted: 2018-04-11

## 2018-04-30 LAB — 25(OH)D3 SERPL-MCNC: 44 NG/ML (ref 32–100)

## 2018-05-05 LAB — PREGNENOLONE: 96 NG/DL

## 2018-06-26 ENCOUNTER — HOSPITAL ENCOUNTER (OUTPATIENT)
Dept: NUCLEAR MEDICINE | Age: 68
Discharge: HOME | End: 2018-06-26
Attending: PSYCHIATRY & NEUROLOGY
Payer: MEDICARE

## 2018-06-26 DIAGNOSIS — R50.9 PERSISTENT FEVER: ICD-10-CM

## 2018-06-26 PROCEDURE — 78320 NM BONE SCAN TOMO SPECT: CPT

## 2018-06-28 ENCOUNTER — HOSPITAL ENCOUNTER (OUTPATIENT)
Dept: NUCLEAR MEDICINE | Age: 68
Discharge: HOME | End: 2018-06-28
Attending: PSYCHIATRY & NEUROLOGY
Payer: MEDICARE

## 2018-06-28 PROCEDURE — 78807 NM INFLAM PROC TOMO SPECT: CPT

## 2020-02-11 NOTE — PROCEDURES
RADIOLOGY POST PROCEDURE NOTE     June 27, 2017       11:29 AM     Preoperative Diagnosis:   Fever of unknown origin. Fibromyalgia. Postoperative Diagnosis:  Same. :  Dr. Rom Hughes    Assistant:  None. Type of Anesthesia: 1% plain lidocaine and IV moderate sedation with Versed and Fentanyl. Procedure/Description:  Image guided bone marrow Bx. Findings:   No bleeding. Estimated blood Loss:  Minimal    Specimen Removed:   yes    Blood transfusions:  None. Implants:  None.     Complications: None    Condition: Stable    Discharge Plan:  discharge home     Sary Bui MD See msg on nijavier Orlie Mins  During conversation with Laura Johnsona at [address] [place] [address] [place] had mentioned that she was in contact with our office and Radha Villatoro said, "yeah, tell Dr Emily Raya to call me, I've got a few things to say to him"  Radha Villatoro cell [phone]  Not sure how you want to proceed, just passing along the message

## 2021-06-27 NOTE — PATIENT INSTRUCTIONS
Current health maintenance issues were reviewed and the patient was advised to followup with his/her PCP for completion of these items.
Dr Davis

## 2021-08-03 PROBLEM — E78.5 HYPERLIPIDEMIA: Status: RESOLVED | Noted: 2021-08-03 | Resolved: 2021-08-03

## 2023-06-26 NOTE — IP AVS SNAPSHOT
Summary of Care Report The Summary of Care report has been created to help improve care coordination. Users with access to [employer] or [address] (Web-based application) may access additional patient information including the Discharge Summary. If you are not currently a [address] user and need more information, please call the number listed below in the [place] [address] section and ask to be connected with Medical Records. Facility Information Name Address Phone [address] [facility] [address] [facility] [id number] [phone] Patient Information Patient Name Sex DOMO Lugo ([id number]) Female 1950 Discharge Information Admitting Provider Service Area Unit Lopez Looney MD / [address] [facility] [phone] Discharge Provider Discharge Date/Time Discharge Disposition Destination (none) 2017 12:30 (Pending) AHR (none) Patient Language Language ENGLISH [13] Hospital Problems as of 2017  Reviewed: 6/15/2017  1:12 PM by Mary Rendon MD  
 None Non-Hospital Problems as of 2017  Reviewed: 6/15/2017  1:12 PM by Mary Rendon MD  
  
  
  
 Class Noted - Resolved Last Modified Active Problems Pain in joint, pelvic region and thigh  Unknown - Present 3/28/2011 by Geralynn Cobia Entered by Geralynn Cobia Lumbago  Unknown - Present 3/28/2011 by Geralynn Cobia Entered by Geralynn Cobia Fatigue  Unknown - Present 3/28/2011 by Geralynn Cobia Entered by Geralynn Cobia Thyroid disease  Unknown - Present 3/28/2011 by Geralynn Cobia Entered by Geralynn Cobia Hyperlipidemia  Unknown - Present 3/28/2011 by Geralynn Cobia Entered by Geralynn Cobia Depression  Unknown - Present 3/28/2011 by Geralynn Cobia Entered by Marea Rash Headache  Unknown - Present 3/28/2011 by Rosalva Rash Entered by Rosalva Braun Fibromyalgia  Unknown - Present 3/28/2011 by Rosalva Braun Entered by Rosalva Braun Encounter for long-term (current) use of high-risk medication  Unknown - Present 2/3/2016 by Sue Turner MD  
  Entered by Tarun Liriano   Diaphoresis  10/4/2011 - Present 10/4/2011 by Sue Turner MD  
  Entered by Sue Turner MD  
  Dehydration  10/4/2011 - Present 10/4/2011 by Sue Turner MD  
  Entered by Sue Turner MD  
  Vitamin B 12 deficiency  10/4/2011 - Present 10/4/2011 by Sue Turner MD  
  Entered by Sue Turner MD  
  Chronic insomnia  12/1/2011 - Present 12/1/2011 by Sue Turner MD  
  Entered by Sue Turner MD  
  Elevated CK  12/1/2011 - Present 12/1/2011 by Sue Turner MD  
  Entered by Sue Turner MD  
  Secondary erythrocytosis  12/1/2011 - Present 12/1/2011 by Seu Turner MD  
  Entered by Sue Turner MD  
  Hypothyroid  1/26/2012 - Present 1/26/2012 by Sue Turner MD  
  Entered by Sue Turner MD  
  Rectocele, female  2/22/2012 - Present 2/22/2012 by Sue Turner MD  
  Entered by Sue Turner MD  
  Recurrent acute sinusitis  3/20/2012 - Present 3/20/2012 by Sue Turner MD  
  Entered by Sue Turner MD  
  Sinusitis  4/19/2012 - Present 4/19/2012 by Sue Turner MD  
  Entered by Sue Turner MD  
  Essential hypertension  6/14/2012 - Present 3/1/2016 by Sue Turner MD  
  Entered by Sue Turner MD  
  Intractable migraine  7/13/2012 - Present 3/1/2016 by Sue Turner MD  
  Entered by Sue Turner MD  
  Lumbosacral spondylosis without myelopathy  9/11/2012 - Present 9/11/2012 by Sue Turner MD  
  Entered by Sue Turner MD  
  Degeneration of lumbar or lumbosacral intervertebral disc  9/11/2012 - Present 9/11/2012 by Sue Turner MD  
  Entered by Sue Turner MD  
 Other and unspecified hyperlipidemia  1/2/2013 - Present 1/2/2013 by Yvonne Garza MD  
  Entered by Yvonne Garza MD  
  Unspecified vitamin D deficiency  1/2/2013 - Present 1/2/2013 by Yvonne Garza MD  
  Entered by Yvonne Garza MD  
  Anemia  1/2/2013 - Present 1/2/2013 by Yvonne Garza MD  
  Entered by Yvonne Garza MD  
  Hyperglycemia  1/2/2013 - Present 1/2/2013 by Yvonne Garza MD  
  Entered by Yvonne Garza MD  
  CTS (carpal tunnel syndrome)  1/31/2013 - Present 1/31/2013 by Yvonne Garza MD  
  Entered by Yvonne Garza MD  
  Tendonitis, bicipital  1/31/2013 - Present 1/31/2013 by Yvonne Garza MD  
  Entered by Yvonne Garza MD  
  Osteoarthritis  1/31/2013 - Present 3/1/2016 by Yvonne Garza MD  
  Entered by Yvonne Garza MD  
  Enthesopathy of hip region  9/24/2013 - Present 9/24/2013 by Yvonne Garza MD  
  Entered by Yvonne Garza MD  
  Recurrent aphthous stomatitis  2/19/2014 - Present 2/19/2014 by Yvonne Garza MD  
  Entered by Yvonne Garza MD  
  Low TSH level  2/19/2014 - Present 2/19/2014 by Yvonne Garza MD  
  Entered by Yvonne Garza MD  
  Multiple lung nodules  9/12/2014 - Present 9/12/2014 by Yvonne Garza MD  
  Entered by Yvonne Garza MD  
  Spasmodic torticollis  10/9/2014 - Present 10/9/2014 by Yvonne Garza MD  
  Entered by Yvonne Garza MD  
  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  
  Entered by Yvonne Garza MD  
  Cervical spondylosis without myelopathy  4/28/2015 - Present 4/28/2015 by Kaley Bailey MD  
  Entered by Kaley Bailey MD  
  Cervical spinal stenosis  4/28/2015 - Present 4/28/2015 by Kaley Bailey MD  
  Entered by Kaley Bailey MD  
  Spondylolisthesis of lumbar region  7/30/2015 - Present 11/10/2015 by Patricia Jeans, NP   Entered by Kaley Bailey MD  
  Spinal stenosis, lumbar region, without neurogenic claudication  10/19/2015 - Present 11/10/2015 by Patricia Jeans, NP  
 Entered by Henny Moe Spondylolisthesis at L5-S1 level  11/16/2015 - Present 11/16/2015 by Lucian Melton MD  
  Entered by Lucian Melton MD  
  S/P lumbar fusion  11/16/2015 - Present 11/17/2015 by Henny Moe Entered by Henny Moe Overview Signed 11/17/2015  4:39 PM by Henny Moe 1. Bilateral L5-S1 laminotomy, medial facetectomy, foraminotomy. 2. L5-S1 transforaminal lumbar interbody fusion with PEEK cage and 
demineralized bone matrix. 3. Posterolateral fusion L5-S1. 
4. Segmental spinal instrumentation, L5-S1, DePuy Expedium type. 11/16/2015 by Dr. Luis Eduardo Copeland Chronic low back pain  Unknown - Present 11/25/2015 by Cielo Cabrera Entered by Cielo Cabrera S/P lumbar spinal fusion  12/1/2015 - Present 12/1/2015 by Jennifer Jean NP Entered by Jennifer Jean NP Neuritis  12/1/2015 - Present 12/1/2015 by Jennifer Jean NP Entered by Jennifer Jean NP   Encounter for long-term current use of high risk medication  12/2/2015 - Present 4/4/2017 by Chauncey Reyes MD  
  Entered by Chauncey Reyes MD  
  Vitamin D deficiency  12/2/2015 - Present 12/2/2015 by Chauncey Reyes MD  
  Entered by Chauncey Reyes MD  
  Lumbar post-laminectomy syndrome  5/3/2016 - Present 5/3/2016 by Chauncey Reyes MD  
  Entered by Chauncey eRyes MD  
  Generalized abdominal pain  10/5/2016 - Present 10/5/2016 by Chauncey Reyes MD  
  Entered by Chauncey Reyes MD  
  FUO (fever of unknown origin)  10/5/2016 - Present 10/5/2016 by Chauncey Reyes MD  
  Entered by Chauncey Reyes MD  
  Cervical facet syndrome  11/18/2016 - Present 11/18/2016 by Shannon Bunch MD  
  Entered by Shannon Bunch MD  
  Chronic pain syndrome  1/17/2017 - Present 1/17/2017 by Shannon Bunch MD  
  Entered by Shannon Bunch MD  
  Lumbar facet arthropathy ([facility] [address]  1/17/2017 - Present 1/17/2017 by Shannon Bunch MD  
  Entered by Shannon Bunch MD  
 Intractable chronic migraine without aura and without status migrainosus  2/7/2017 - Present 2/7/2017 by Stephen Jason MD  
  Entered by Stephen Jason MD  
  
You are allergic to the following Allergen Reactions Avelox (Moxifloxacin) Other (comments) Other reaction(s): other/intolerance Seizures Nerve pain Azithromycin Rash Other (comments) Other reaction(s): unknown Pt states is not allergic to this med Acute toxic reaction Bactrim (Sulfamethoprim Ds) Other (comments) Severe abdominal pain Bupropion Other (comments) Muscle pains All antidepressants. Ciprofloxacin Other (comments) Other reaction(s): other/intolerance Lowers bp AMS Duloxetine Other (comments) \"shocks in brain\" Focalin (Dexmethylphenidate) Other (comments) Pt denies any allergic Keflex (Cephalexin) Other (comments) Abdominal pain Macrobid (Nitrofurantoin Monohyd/M-Cryst) Swelling Other reaction(s): other/intolerance Swelling of colon Other reaction(s): other/intolerance Swelling of colon Other Medication Other (comments) Dissolving sutures Phenergan (Promethazine) Other (comments) Other reaction(s): neurological reaction \" i see things\" hullucinations Sucralfate Myalgia Patient able to tolerate now Sulfa (Sulfonamide Antibiotics) Hives Sulfate Salt Unknown (comments) Venlafaxine Itching Yeast, Dried Other (comments) Hives Patient reported extreme lethargy, bloating/abdominal pain, and digestive problems when consumes yeast or foods containing yeast  
  
  
Current Discharge Medication List  
  
CONTINUE these medications which have NOT CHANGED Dose & Instructions Dispensing Information Comments  
 acetaminophen 500 mg tablet Commonly known as:  TYLENOL Dose:  1000 mg  
1,000 mg. Refills:  0  
   
 CALCIUM PO  Dose:  1000 mg  
 Take 1,000 mg by mouth. Refills:  0  
   
 cyanocobalamin 1,000 mcg/mL injection Commonly known as:  VITAMIN B12 Dose:  1000 mcg  
1,000 mcg by IntraMUSCular route every thirty (30) days. Refills:  0  
   
 dextroamphetamine-amphetamine 30 mg tablet Commonly known as:  ADDERALL Dose:  30 mg Take 1 Tab by mouth three (3) times dailyIndications: ATTENTION-DEFICIT HYPERACTIVITY DISORDER, inattention. Max Daily Amount: 3 Tabs Quantity:  90 Tab Refills:  0  
   
 ergocalciferol 50,000 unit capsule Commonly known as:  ERGOCALCIFEROL  
 TAKE 1 CAPSULE BY MOUTH EVERY 7 DAYS   90 day supply  Indications: VITAMIN D DEFICIENCY Quantity:  12 Cap Refills:  3  
   
 estradiol 0.1 mg/24 hr  
Commonly known as:  Dara Delaney Apply one patch transdermally two times a week on Wednesday and Saturday Quantity:  24 Patch Refills:  3  
   
 levocetirizine 5 mg tablet Commonly known as:  Maya Meager Dose:  5 mg Take 1 Tab by mouth daily as needed. Quantity:  30 Tab Refills:  5  
   
 levothyroxine 112 mcg tablet Commonly known as:  SYNTHROID Dose:  112 mcg Take 1 Tab by mouth Daily (before breakfast) for 90 days. Indications: hypothyroidism Quantity:  90 Tab Refills:  3  
   
 lisinopril 20 mg tablet Commonly known as:  PRINIVIL ZESTRIL  
 TAKE 1 TABLET BY MOUTH TWO TIMES A DAY 90 day supply  Indications: hypertension Quantity:  180 Tab Refills:  3 LORazepam 2 mg tablet Commonly known as:  ATIVAN Dose:  2 mg Take 2 mg by mouth. Refills:  0 Needle (Disp) 27 G 27 gauge x 1 1/4\" Ndle Use with 1ml syringe to inject b-12 Quantity:  10 Each Refills:  5 OMEGA-3S-DHA-EPA-FISH OIL PO Take  by Mouth. Refills:  0  
   
 * OTHER  
 IVG infusions every 4 weeks-Dr. Andrey Carvjaal Refills:  0  
   
 * OTHER  
 1 liter of 1/2 normal saline with 20 april of KCL. 1 liter over 4 hours Quantity:  1 Bag Refills:  0  
   
 * oxyCODONE ER 10 mg ER tablet Commonly known as:  OxyCONTIN  
 1 po bid --- chronic pain  Indications: Chronic Pain, Severe Pain Quantity:  60 Tab Refills:  0  
   
 * oxyCODONE IR 5 mg immediate release tablet Commonly known as:  Osiel Mary Carmen Dose:  5 mg Take 1 Tab by mouth four (4) times daily as needed for Pain for up to 30 days. Max Daily Amount: 20 mg. Indications: Pain Quantity:  120 Tab Refills:  0 PREGNENOLONE Dose:  400 mg Take 400 mg by mouth daily. Indications: for stress hormone Refills:  0 PRIVIGEN 10 % infusion Generic drug:  immune globulin 10% (human) Dose:  30 g  
30 g by IntraVENous route every thirty (30) days. Refills:  0  
   
 pseudoephedrine 30 mg tablet Commonly known as:  SUDAFED Dose:  30 mg Take 1 Tab by mouth two (2) times a day. Quantity:  60 Tab Refills:  5 RESTASIS 0.05 % ophthalmic emulsion Generic drug:  cycloSPORINE Dose:  1 Drop Administer 1 Drop to both eyes two (2) times a day. Refills:  0 SUCRALFATE PO Take  by mouth. Refills:  0 Syringe (Disposable) 1 mL Syrg Use to inject b-12 sq Quantity:  10 Syringe Refills:  5  
   
 therapeutic multivitamin tablet Commonly known as:  [facility] Take 1 Tab by Mouth Once a Day. 1500 MG DAILY Refills:  0 WELLBUTRIN 75 mg tablet Generic drug:  buPROPion Take  by mouth two (2) times a day. Refills:  0  
   
 ZOLMitriptan 5 mg tablet Commonly known as:  ZOMIG  
 TAKE 1 TABLET BY MOUTH AS NEEDED FOR MIGRAINE FOR UP TO 90 DAYS  Indications: MIGRAINE Quantity:  54 Tab Refills:  3  
   
 * Notice: This list has 4 medication(s) that are the same as other medications prescribed for you. Read the directions carefully, and ask your doctor or other care provider to review them with you. Follow-up Information Follow up With Details Comments Contact Info  Phys Other, MD   Patient can only remember the practice name and not the physician Discharge Instructions DISCHARGE SUMMARY from Nurse The following personal items are in your possession at time of discharge: 
 
  
Visual Aid: None PATIENT INSTRUCTIONS: 
 
After general anesthesia or intravenous sedation, for 24 hours or while taking prescription Narcotics: · Limit your activities · Do not drive and operate hazardous machinery · Do not make important personal or business decisions · Do  not drink alcoholic beverages · If you have not urinated within 8 hours after discharge, please contact your surgeon on call. Report the following to your surgeon: 
· Excessive pain, swelling, redness or odor of or around the surgical area · Temperature over 100.5 · Nausea and vomiting lasting longer than 4 hours or if unable to take medications · Any signs of decreased circulation or nerve impairment to extremity: change in color, persistent  numbness, tingling, coldness or increase pain · Any questions What to do at Home: *  Please give a list of your current medications to your Primary Care Provider. *  Please update this list whenever your medications are discontinued, doses are 
    changed, or new medications (including over-the-counter products) are added. *  Please carry medication information at all times in case of emergency situations. These are general instructions for a healthy lifestyle: No smoking/ No tobacco products/ Avoid exposure to second hand smoke Surgeon General's Warning:  Quitting smoking now greatly reduces serious risk to your health. Obesity, smoking, and sedentary lifestyle greatly increases your risk for illness A healthy diet, regular physical exercise & weight monitoring are important for maintaining a healthy lifestyle You may be retaining fluid if you have a history of heart failure or if you experience any of the following symptoms:  Weight gain of 3 pounds or more overnight or 5 pounds in a week, increased swelling in our hands or feet or shortness of breath while lying flat in bed. Please call your doctor as soon as you notice any of these symptoms; do not wait until your next office visit. Recognize signs and symptoms of STROKE: 
 
F-face looks uneven A-arms unable to move or move unevenly S-speech slurred or non-existent T-time-call 911 as soon as signs and symptoms begin-DO NOT go Back to bed or wait to see if you get better-TIME IS BRAIN. Warning Signs of HEART ATTACK Call 911 if you have these symptoms: 
? Chest discomfort. Most heart attacks involve discomfort in the center of the chest that lasts more than a few minutes, or that goes away and comes back. It can feel like uncomfortable pressure, squeezing, fullness, or pain. ? Discomfort in other areas of the upper body. Symptoms can include pain or discomfort in one or both arms, the back, neck, jaw, or stomach. ? Shortness of breath with or without chest discomfort. ? Other signs may include breaking out in a cold sweat, nausea, or lightheadedness. Don't wait more than five minutes to call [address]! Fast action can save your life. Calling 911 is almost always the fastest way to get lifesaving treatment. Emergency Medical Services staff can begin treatment when they arrive  up to an hour sooner than if someone gets to the hospital by car. The discharge information has been reviewed with the patient and caregiver. The patient and caregiver verbalized understanding. Discharge medications reviewed with the patient and caregiver and appropriate educational materials and side effects teaching were provided. Bone Marrow Aspiration and Biopsy: What to Expect at [facility] Your Recovery The biopsy site may feel sore for several days. It can help to walk, take pain medicine, and put ice packs on the site.  You will probably be able to return to work and your usual activities the day after the procedure. Your doctor or nurse will call you with the results of your test. 
This care sheet gives you a general idea about how long it will take for you to recover. But each person recovers at a different pace. Follow the steps below to get better as quickly as possible. How can you care for yourself at home? Activity · Rest when you feel tired. Getting enough sleep will help you recover. · You may drive when you are no longer taking pain pills and can quickly move your foot from the gas pedal to the brake. You must also be able to sit comfortably for a long period of time, even if you do not plan to go far. You might get caught in traffic. · Most people are able to return to work the day after the procedure. Medicines · Your doctor will tell you if and when you can restart your medicines. He or she will also give you instructions about taking any new medicines. · If you take blood thinners, such as warfarin (Coumadin), clopidogrel (Plavix), or aspirin, be sure to talk to your doctor. He or she will tell you if and when to start taking those medicines again. Make sure that you understand exactly what your doctor wants you to do. · Be safe with medicines. Take pain medicines exactly as directed. ¨ If the doctor gave you a prescription medicine for pain, take it as prescribed. ¨ If you are not taking a prescription pain medicine, take an over-the-counter medicine such as acetaminophen (Tylenol), ibuprofen (Advil, Motrin), or naproxen (Aleve). Read and follow all instructions on the label. ¨ Do not take two or more pain medicines at the same time unless the doctor told you to. Many pain medicines have acetaminophen, which is Tylenol. Too much acetaminophen (Tylenol) can be harmful. · If you think your pain medicine is making you sick to your stomach: 
¨ Take your medicine after meals (unless your doctor has told you not to). ¨ Ask your doctor for a different pain medicine. · If your doctor prescribed antibiotics, take them as directed. Do not stop taking them just because you feel better. Ice · Put ice or a cold pack on the biopsy site for 10 to 20 minutes at a time. Put a thin cloth between the ice and your skin. Follow-up care is a key part of your treatment and safety. Be sure to make and go to all appointments, and call your doctor if you are having problems. It's also a good idea to know your test results and keep a list of the medicines you take. When should you call for help? Call 911 anytime you think you may need emergency care. For example, call if: 
· You passed out (lost consciousness). Call your doctor now or seek immediate medical care if: 
· You have signs of infection, such as: 
¨ Increased pain, swelling, warmth, or redness. ¨ Red streaks leading from the biopsy site. ¨ Pus draining from the biopsy site. ¨ Swollen lymph nodes in your neck, armpits, or groin. ¨ A fever. Watch closely for any changes in your health, and be sure to contact your doctor if: 
· You are not getting better as expected. Where can you learn more? Go to http://zoe-jocelyne.info/. Enter E148 in the search box to learn more about \"Bone Marrow Aspiration and Biopsy: What to Expect at Home. \" Current as of: October 14, 2016 Content Version: 11.3 © 3841-3038 [employer]. Care instructions adapted under license by [employer] (which disclaims liability or warranty for this information). If you have questions about a medical condition or this instruction, always ask your healthcare professional. [place] [address] any warranty or liability for your use of this information. Patient armband removed and shredded Chart Review Routing History Recipient Method Report Sent By Mecca Silva MD  
Phone: [phone] In [employer] Routed Carine Bowser MD [[id number]] 11/16/2015  2:29 PM 11/16/2015 Sue Turner MD  
Phone: [phone] In [employer] Routed Carine Bowser MD [[id number]] 11/16/2015  2:29 PM 11/16/2015 Sue Turner MD  
Phone: [phone] In [facility] amb office visit enc summ w/hx Lisa Cannon [[id number]] 6/27/2016  1:55 PM 06/24/2016 Muscle Hinge Flap Text: The defect edges were debeveled with a #15 scalpel blade.  Given the size, depth and location of the defect and the proximity to free margins a muscle hinge flap was deemed most appropriate.  Using a sterile surgical marker, an appropriate hinge flap was drawn incorporating the defect. The area thus outlined was incised with a #15 scalpel blade.  The skin margins were undermined to an appropriate distance in all directions utilizing iris scissors.

## 2023-11-16 NOTE — PROGRESS NOTES
Referral for pt's mediport placement faxed to Dr. Christoph Jo. They will schedule directly with pt. Yes

## 2024-02-04 NOTE — PROGRESS NOTES
Nursing Notes    Patient presents to the office today for a consult. Patient rates her pain at 4/10 on the numerical pain scale. POC UDS was not performed in office today    Any new labs or imaging since last appointment? NO    Have you been to an emergency room (ER) or urgent care clinic since your last visit? NO            Have you been hospitalized since your last visit? NO     If yes, where, when, and reason for visit? Have you seen or consulted any other health care providers outside of the [address] [place] [address]  since your last visit? NO     If yes, where, when, and reason for visit? HM deferred to pcp. (1) More than 48 hours/None

## 2024-02-23 NOTE — TELEPHONE ENCOUNTER
Patient left a voicemail stating that she keeps getting a fever of 101, along with burning pain from the RFA procedure. Called patient who stated that she has been having recurrent fever from medial conditions for 13 months until she has the [facility], patient stated that after the REESE she had pain relief and the fever went away. Patient reports the relief lasted for 21 days and once she had the RFA on 2/6/2017 the fever returned within 2 hours of having the procedure along with a burning pain. Patient reports that every night starting at around 1-3pm she gets a fever of about 100.8. Patient reports that she doesn't have a fever right now and her pain level is a 3/10 right now. Patient states that when the fever starts her pain level rises to about a 8-9/10. Patient has a follow up appointment for 3/1/2017. Patient was offered an earlier appointment, which she accepted for 2/23/2017 @ 8am. Patient verbalized understanding of appointment details and call was ended. 75

## (undated) DEVICE — DRAPE TWL SURG 16X26IN BLU ORB04] ALLCARE INC]

## (undated) DEVICE — CUFF BLD PRESSURE MONITORING LNG AD 23-33 CM 1 TUBE MY CUF

## (undated) DEVICE — SYR 10ML CTRL LR LCK NSAF LF --

## (undated) DEVICE — DRAPE,REIN 53X77,STERILE: Brand: MEDLINE

## (undated) DEVICE — ELECTRODE ES AD DISPER HYDRGEL THN FOAM ADH SCALLOPED EDGE

## (undated) DEVICE — MIRAGE SWIFT II PILLOW LGE: Brand: MIRAGE SWIFT II

## (undated) DEVICE — (D)BNDG ADHESIVE FABRIC 3/4X3 -- DISC BY MFR USE ITEM 357960

## (undated) DEVICE — CURVED SHARP RF CANNULA, RADIOPAQUE MARKER: Brand: RADIOPAQUE RADIOFREQUENCY CANNULA

## (undated) DEVICE — TRAY SUPP STD NO DRUG W EXTENSION SET

## (undated) DEVICE — SOLUTION IV 250ML 0.9% SOD CHL CLR INJ FLX BG CONT PRT CLSR

## (undated) DEVICE — SET EXTN SM 0.5ML L13IN BOR W/O INJ SITE